# Patient Record
Sex: FEMALE | Race: WHITE | NOT HISPANIC OR LATINO | Employment: OTHER | URBAN - METROPOLITAN AREA
[De-identification: names, ages, dates, MRNs, and addresses within clinical notes are randomized per-mention and may not be internally consistent; named-entity substitution may affect disease eponyms.]

---

## 2017-02-02 ENCOUNTER — OFFICE VISIT (OUTPATIENT)
Dept: DERMATOLOGY | Facility: CLINIC | Age: 57
End: 2017-02-02

## 2017-02-02 DIAGNOSIS — D22.9 MULTIPLE BENIGN NEVI: ICD-10-CM

## 2017-02-02 DIAGNOSIS — L82.1 SEBORRHEIC KERATOSIS: ICD-10-CM

## 2017-02-02 DIAGNOSIS — L30.9 ECZEMA OF BOTH HANDS: ICD-10-CM

## 2017-02-02 DIAGNOSIS — L57.0 AK (ACTINIC KERATOSIS): Primary | ICD-10-CM

## 2017-02-02 DIAGNOSIS — L85.3 XEROSIS OF SKIN: ICD-10-CM

## 2017-02-02 DIAGNOSIS — L21.9 DERMATITIS, SEBORRHEIC: ICD-10-CM

## 2017-02-02 DIAGNOSIS — L81.4 SOLAR LENTIGINOSIS: ICD-10-CM

## 2017-02-02 RX ORDER — KETOCONAZOLE 20 MG/ML
SHAMPOO TOPICAL
Qty: 120 ML | Refills: 11 | Status: SHIPPED | OUTPATIENT
Start: 2017-02-02 | End: 2017-11-08

## 2017-02-02 RX ORDER — TRIAMCINOLONE ACETONIDE 1 MG/G
OINTMENT TOPICAL 2 TIMES DAILY
Qty: 80 G | Refills: 3 | Status: SHIPPED | OUTPATIENT
Start: 2017-02-02 | End: 2017-11-08

## 2017-02-02 ASSESSMENT — PAIN SCALES - GENERAL: PAINLEVEL: NO PAIN (0)

## 2017-02-02 NOTE — PATIENT INSTRUCTIONS
For your Hands:   - recommend using a gentle soap like dove  - recommend using triamcinolone ointment twice daily to your affected areas on your feet and your hands   - recommend using Norwegian Formula Hand Cream two to three times daily     For your Scalp and Face  - recommend ketoconazole shampoo 2-3 times per week to your face and scalp  - Apply 2-3 x weekly to base of scalp when hair is wet and massage in, leave in place for approximately 5-10 minutes then wash out.  - can use your desonide cream if you have any itchy flare    General Dermatology Wound Care Instructions for Liquid Nitrogen Treatment    The treatment area will appear white at first. Over the next several hours a fluid-filled blister may form. The blister can be very dark. If blister appears, it will remain blistered for about a week. Then a scab will form over the area.    Leave the blistered area uncovered as long as it remains closed. If the area breaks open, you may cover it with a clean band aid. Do not pull off the skin covering the blister.    If the blistered area becomes uncomfortably filled with fluid, you may release some of the fluid by puncturing the blister with a needle that has been cleansed with alcohol.    If the skin covering the blister comes off, clean the area daily with soap and water. Apply a small amount of Vaseline and then cover with a clean band aid. Change the band aid twice daily until the skin is completely healed.    Call us if.....    1. You have signs of infection such as thick yellow or pus-like drainage from the wound site or a fever over 100 degrees Fahrenheit.     2. You have any questions or are not sure how to take care of the wound.    General Dermatology  [phone]    Dry Skin    What is dry skin?    Common skin problem    Can be worse during the winter     Affects all ages    Occurs in people with or without other skin problems    What does it look like?    Fine lines in the skin become more visible      Rough feeling skin     Flaky skin    Most common on the arms and legs    Skin can become cracked, especially on the hands and feet    What are some problems caused by dry skin?     Itching    Rubbing or scratching can cause thickened, rough skin patches    Cracks in skin can be painful    Red, itchy, scaly skin (called eczema) can occur    Yellow crusting or pus could be signs of an infection    What causes dry skin?    A lack of water in the top layer of the skin    Too much soapy water,  hot water, or harsh chemicals    Aging and sun damage    How do I treat dry skin?    Shower or bathe daily for under ten minutes with lukewarm water and mild soap.    Pat yourself dry with a towel gently and leave your skin slightly damp.    Use moisturizing cream or ointment right away.  Avoid lotions.    What kind of mild soap should I be using?    Camay , Dove , Tone , Neutrogena , Purpose , or Oil of Olay     A non-detergent cleanser, like Cetaphil , can be used.    What should I stay away from?    Scented soaps     Bath oils    What moisturizers should I be using?    Cetaphil Cream,CeraVe Cream, Vanicream, Aquaphilic, Eucerin, Aquaphor, or Vaseline     Always apply after showering or bathing.    Reapply throughout the day, if possible.    If dry skin affects your hands, always reapply after handwashing.    What else should I know?    Using a humidifier during winter months may help.    If dry skin gets worse or if eczema develops, a steroid cream may be needed.

## 2017-02-02 NOTE — PROGRESS NOTES
[facility] Dermatology Note    Dermatology Problem List:  -AKs  -Family history of NMSC    Encounter Date: Feb 2, 2017    CC:   Chief Complaint   Patient presents with     Derm Problem     oRmi is here today for a skin check. No major concerns.       History of Present Illness:  Romi Cortez is a 56 year old who presents for follow up evaluation for skin check.  She has a family history of non melanoma skin cancer in her mother.  She denies any other new or changing lesions.  She denies any fevers or chills.  She states that she has no new or changing lesions.      Past Medical History:   History reviewed. No pertinent past medical history.  Past Surgical History   Procedure Laterality Date     Laparoscopic appendectomy  11/21/2013     Procedure: LAPAROSCOPIC APPENDECTOMY;  Laparoscopic Appendectomy;  Surgeon: Sung Rodriguez MD;  Location:  OR     D & c  1996       Medications:  Current Outpatient Prescriptions   Medication Sig Dispense Refill     triamcinolone (KENALOG) 0.1 % ointment Apply topically 2 times daily To your affected areas on your hands and feet 80 g 3     ketoconazole (NIZORAL) 2 % shampoo Apply 2 x weekly to base of scalp when hair is wet and massage in, leave in place for approximately 5-10 minutes then wash out. 120 mL 11     ARTIFICIAL TEAR OP Apply 1 drop to eye as needed Both eyes, 1-2 times a month       sertraline (ZOLOFT) 100 MG tablet Take by mouth daily       desonide (DESOWEN) 0.05 % ointment Apply topically 2 times daily To itchy and scaly rash at ears until clear as needed 60 g 11     LamoTRIgine (LAMICTAL PO) Take 200 mg by mouth daily          No Known Allergies    Review of Systems:  -Skin Establ Pt: The patient denies any new rash, pruritus, or lesions that are symptomatic, changing or bleeding, except as per HPI.  -Constitutional: The patient denies fatigue, fevers, chills, unintended weight loss, and night sweats.  -HEENT: Patient denies  nonhealing oral sores.    Physical exam:  There were no vitals taken for this visit.  - This is a well developed, well-nourished female in no acute distress, in a pleasant mood.    - Aguirre's skin type 2  - A full skin examination was performed including the face, scalp, ears, eyelids, lips, neck, chest, back, abdomen, hands, feet, upper and lower extremities and buttocks was performed and findings are as follows:    1 - Actinic Keratosis-Erythematous gritty papules located on the left malar cheek.  2 - Seborrheic Keratosis- light tan to brown, well defined waxy/warty stuck on looking elevated plaque(s) located on chest and back.  3 - Solar lentigines - Scattered brown macules on sun exposed areas.  4 - Xerosis Cutis/Eczematous dermatitis - mild xerosis of the legs and the feet, on the bilateral great toes as well as on the tips of the fingers of the hands there are eczematous plaques with mild fissuring concerning for irritant/eczematous dermatitis.   5 - Seborrheic Dermatitis - Non adherent greasy scale on the scalp and nasolabial folds  6 - Benign nevus- Multiple brown pigmented macules and papules are identified on the exam that are under 0.6cm in size and show normal pigment network and benign growth patterns located on the chest and back  - No other lesions of concern on areas examined.     Impression/Plan:  1 - Actinic Keratosis- Identified at above locations. Each site was treated with cryo therapy, 10 second freeze thaw cycle X 2 cycles per site. A total of 1 sites were treated. The patient was advised that the treated areas will become red, swollen, may develop a blister and then should crust and peal off in the next 1-2 weeks.   2 - Seborrheic Keratosis- Reassured as to the the benign nature.  As these are not itchy or painful, no further treatment required.   3 - Solar lentiginosis: Sun protection with SPF 30 or higher recommended  4 - Xerosis Cutis/Eczematous dermatitis - CeraVe cream daily for  xerosis, recommended Norwegian hand cream formula to use on the hands twice daily, also suggested patient use triamcinolone ointment twice daily to the red itchy areas.  Patient expressed understanding.   5 - Seborrheic Dermatitis: recommended using ketoconazole shampoo to face and scalp 2 times weekly, and also using desonide cream as needed for itching  6 - Benign nevi- abcd of melanoma were discussed and self skin checks were advised.     CC Dr. Persaud on close of this encounter.  Follow-up 1 year or sooner if needed     staffed the patient.    Staff Involved:  Resident(Roxy Gallegos)/Staff(as above)    I have personally examined this patient and agree with Dr. Gallegos's documentation and plan of care. I have reviewed and amended the resident's note above. The documentation accurately reflects my clinical observations, diagnoses, treatment and follow-up plans.   I was present for key portions of the procedure.     Nayana Bazan MD  Dermatology Staff

## 2017-02-02 NOTE — Clinical Note
2/2/2017       RE: Romi Cortez  [address] [facility] [address]     Dear Colleague,    Thank you for referring your patient, Romi Cortez, to the [facility] DERMATOLOGY at [facility]. Please see a copy of my visit note below.    [facility] Dermatology Note    Dermatology Problem List:  -AKs  -Family history of NMSC    Encounter Date: Feb 2, 2017    CC:   Chief Complaint   Patient presents with     Derm Problem     Romi is here today for a skin check. No major concerns.       History of Present Illness:  Romi Cortez is a 56 year old who presents for follow up evaluation for skin check.  She has a family history of non melanoma skin cancer in her mother.  She denies any other new or changing lesions.  She denies any fevers or chills.  She states that she has no new or changing lesions.      Past Medical History:   History reviewed. No pertinent past medical history.  Past Surgical History   Procedure Laterality Date     Laparoscopic appendectomy  11/21/2013     Procedure: LAPAROSCOPIC APPENDECTOMY;  Laparoscopic Appendectomy;  Surgeon: Sung Rodriguez MD;  Location:  OR     D & c  1996       Medications:  Current Outpatient Prescriptions   Medication Sig Dispense Refill     triamcinolone (KENALOG) 0.1 % ointment Apply topically 2 times daily To your affected areas on your hands and feet 80 g 3     ketoconazole (NIZORAL) 2 % shampoo Apply 2 x weekly to base of scalp when hair is wet and massage in, leave in place for approximately 5-10 minutes then wash out. 120 mL 11     ARTIFICIAL TEAR OP Apply 1 drop to eye as needed Both eyes, 1-2 times a month       sertraline (ZOLOFT) 100 MG tablet Take by mouth daily       desonide (DESOWEN) 0.05 % ointment Apply topically 2 times daily To itchy and scaly rash at ears until clear as needed 60 g 11     LamoTRIgine (LAMICTAL PO) Take 200 mg by mouth daily          No Known  Allergies    Review of Systems:  -Skin Establ Pt: The patient denies any new rash, pruritus, or lesions that are symptomatic, changing or bleeding, except as per HPI.  -Constitutional: The patient denies fatigue, fevers, chills, unintended weight loss, and night sweats.  -HEENT: Patient denies nonhealing oral sores.    Physical exam:  There were no vitals taken for this visit.  - This is a well developed, well-nourished female in no acute distress, in a pleasant mood.    - Aguirre's skin type 2  - A full skin examination was performed including the face, scalp, ears, eyelids, lips, neck, chest, back, abdomen, hands, feet, upper and lower extremities and buttocks was performed and findings are as follows:    1 - Actinic Keratosis-Erythematous gritty papules located on the left malar cheek.  2 - Seborrheic Keratosis- light tan to brown, well defined waxy/warty stuck on looking elevated plaque(s) located on chest and back.  3 - Solar lentigines - Scattered brown macules on sun exposed areas.  4 - Xerosis Cutis/Eczematous dermatitis - mild xerosis of the legs and the feet, on the bilateral great toes as well as on the tips of the fingers of the hands there are eczematous plaques with mild fissuring concerning for irritant/eczematous dermatitis.   5 - Seborrheic Dermatitis - Non adherent greasy scale on the scalp and nasolabial folds  6 - Benign nevus- Multiple brown pigmented macules and papules are identified on the exam that are under 0.6cm in size and show normal pigment network and benign growth patterns located on the chest and back  - No other lesions of concern on areas examined.     Impression/Plan:  1 - Actinic Keratosis- Identified at above locations. Each site was treated with cryo therapy, 10 second freeze thaw cycle X 2 cycles per site. A total of 1 sites were treated. The patient was advised that the treated areas will become red, swollen, may develop a blister and then should crust and peal off in the  next 1-2 weeks.   2 - Seborrheic Keratosis- Reassured as to the the benign nature.  As these are not itchy or painful, no further treatment required.   3 - Solar lentiginosis: Sun protection with SPF 30 or higher recommended  4 - Xerosis Cutis/Eczematous dermatitis - CeraVe cream daily for xerosis, recommended Norwegian hand cream formula to use on the hands twice daily, also suggested patient use triamcinolone ointment twice daily to the red itchy areas.  Patient expressed understanding.   5 - Seborrheic Dermatitis: recommended using ketoconazole shampoo to face and scalp 2 times weekly, and also using desonide cream as needed for itching  6 - Benign nevi- abcd of melanoma were discussed and self skin checks were advised.     CC Dr. Persaud on close of this encounter.  Follow-up 1 year or sooner if needed     staffed the patient.    Staff Involved:  Resident(Roxy Gallegos)/Staff(as above)    I have personally examined this patient and agree with Dr. Gallegos's documentation and plan of care. I have reviewed and amended the resident's note above. The documentation accurately reflects my clinical observations, diagnoses, treatment and follow-up plans.   I was present for key portions of the procedure.     Nayana Bazan MD  Dermatology Staff

## 2017-02-02 NOTE — MR AVS SNAPSHOT
After Visit Summary   2/2/2017    Romi Cortez    MRN: [id number]           Patient Information     Date Of Birth          1960        Visit Information        Provider Department      2/2/2017 9:45 AM Roxy Gallegos MD [facility] Dermatology        Today's Diagnoses     AK (actinic keratosis)    -  1     Solar lentiginosis         Dermatitis, seborrheic         Eczema of both hands         Xerosis of skin         Seborrheic keratosis           Care Instructions    For your Hands:   - recommend using a gentle soap like dove  - recommend using triamcinolone ointment twice daily to your affected areas on your feet and your hands   - recommend using Norwegian Formula Hand Cream two to three times daily     For your Scalp and Face  - recommend ketoconazole shampoo 2-3 times per week to your face and scalp  - Apply 2-3 x weekly to base of scalp when hair is wet and massage in, leave in place for approximately 5-10 minutes then wash out.  - can use your desonide cream if you have any itchy flare    General Dermatology Wound Care Instructions for Liquid Nitrogen Treatment    The treatment area will appear white at first. Over the next several hours a fluid-filled blister may form. The blister can be very dark. If blister appears, it will remain blistered for about a week. Then a scab will form over the area.    Leave the blistered area uncovered as long as it remains closed. If the area breaks open, you may cover it with a clean band aid. Do not pull off the skin covering the blister.    If the blistered area becomes uncomfortably filled with fluid, you may release some of the fluid by puncturing the blister with a needle that has been cleansed with alcohol.    If the skin covering the blister comes off, clean the area daily with soap and water. Apply a small amount of Vaseline and then cover with a clean band aid. Change the band aid twice daily until the skin is completely healed.    Call us  if.....    1. You have signs of infection such as thick yellow or pus-like drainage from the wound site or a fever over 100 degrees Fahrenheit.     2. You have any questions or are not sure how to take care of the wound.    General Dermatology  [phone]    Dry Skin    What is dry skin?    Common skin problem    Can be worse during the winter     Affects all ages    Occurs in people with or without other skin problems    What does it look like?    Fine lines in the skin become more visible     Rough feeling skin     Flaky skin    Most common on the arms and legs    Skin can become cracked, especially on the hands and feet    What are some problems caused by dry skin?     Itching    Rubbing or scratching can cause thickened, rough skin patches    Cracks in skin can be painful    Red, itchy, scaly skin (called eczema) can occur    Yellow crusting or pus could be signs of an infection    What causes dry skin?    A lack of water in the top layer of the skin    Too much soapy water,  hot water, or harsh chemicals    Aging and sun damage    How do I treat dry skin?    Shower or bathe daily for under ten minutes with lukewarm water and mild soap.    Pat yourself dry with a towel gently and leave your skin slightly damp.    Use moisturizing cream or ointment right away.  Avoid lotions.    What kind of mild soap should I be using?    Camay , Dove , Tone , Neutrogena , Purpose , or Oil of Olay     A non-detergent cleanser, like Cetaphil , can be used.    What should I stay away from?    Scented soaps     Bath oils    What moisturizers should I be using?    Cetaphil Cream,CeraVe Cream, Vanicream, Aquaphilic, Eucerin, Aquaphor, or Vaseline     Always apply after showering or bathing.    Reapply throughout the day, if possible.    If dry skin affects your hands, always reapply after handwashing.    What else should I know?    Using a humidifier during winter months may help.    If dry skin gets worse or if eczema develops, a  steroid cream may be needed.          Follow-ups after your visit        Who to contact     Please call your clinic at [phone] to:    Ask questions about your health    Make or cancel appointments    Discuss your medicines    Learn about your test results    Speak to your doctor   If you have compliments or concerns about an experience at your clinic, or if you wish to file a complaint, please contact [facility] Patient Relations at [phone] or email us at [email or web address]         Additional Information About Your Visit        [employer] Information     Delivered gives you secure access to your electronic health record. If you see a primary care provider, you can also send messages to your care team and make appointments. If you have questions, please call your primary care clinic.  If you do not have a primary care provider, please call [phone] and they will assist you.      Delivered is an electronic gateway that provides easy, online access to your medical records. With [employer]ivered, you can request a clinic appointment, read your test results, renew a prescription or communicate with your care team.     To access your existing account, please contact your [facility] Clinic or call [phone] for assistance.        Care EveryWhere ID     This is your Care EveryWhere ID. This could be used by other organizations to access your [place] medical records  [id number]         Blood Pressure from Last 3 Encounters:   11/21/13 104/71    Weight from Last 3 Encounters:   11/20/13 88.905 kg (196 lb)              Today, you had the following     No orders found for display         Today's Medication Changes          These changes are accurate as of: 2/2/17 10:24 AM.  If you have any questions, ask your nurse or doctor.               Start taking these medicines.        Dose/Directions    triamcinolone 0.1 % ointment   Commonly known as:  KENALOG   Used  for:  Eczema of both hands   Started by:  Roxy Gallegos MD        Apply topically 2 times daily To your affected areas on your hands and feet   Quantity:  80 g   Refills:  3         These medicines have changed or have updated prescriptions.        Dose/Directions    ketoconazole 2 % shampoo   Commonly known as:  NIZORAL   This may have changed:  additional instructions   Used for:  Dermatitis, seborrheic   Changed by:  Roxy Gallegos MD        Apply 2 x weekly to base of scalp when hair is wet and massage in, leave in place for approximately 5-10 minutes then wash out.   Quantity:  120 mL   Refills:  11            Where to get your medicines      These medications were sent to [facility] [id number] [place] [facility] - [place], [place] - [address]  [address] [address]     Phone:  [phone]    - ketoconazole 2 % shampoo  - triamcinolone 0.1 % ointment             Primary Care Provider Office Phone # Fax #    Mayra Persaud [phone]       [facility] [address] [address]        Thank you!     Thank you for choosing [facility] DERMATOLOGY  for your care. Our goal is always to provide you with excellent care. Hearing back from our patients is one way we can continue to improve our services. Please take a few minutes to complete the written survey that you may receive in the mail after your visit with us. Thank you!             Your Updated Medication List - Protect others around you: Learn how to safely use, store and throw away your medicines at www.disposemymeds.org.          This list is accurate as of: 2/2/17 10:24 AM.  Always use your most recent med list.                   Brand Name Dispense Instructions for use    ARTIFICIAL TEAR OP      Apply 1 drop to eye as needed Both eyes, 1-2 times a month       desonide 0.05 % ointment    DESOWEN    60 g    Apply topically 2 times daily To itchy and scaly rash at ears until clear as needed       ketoconazole 2  % shampoo    NIZORAL    120 mL    Apply 2 x weekly to base of scalp when hair is wet and massage in, leave in place for approximately 5-10 minutes then wash out.       LAMICTAL PO      Take 200 mg by mouth daily       sertraline 100 MG tablet    ZOLOFT     Take by mouth daily       triamcinolone 0.1 % ointment    KENALOG    80 g    Apply topically 2 times daily To your affected areas on your hands and feet

## 2017-02-02 NOTE — NURSING NOTE
Dermatology Rooming Note    Romi Cortez's goals for this visit include:   Chief Complaint   Patient presents with     Derm Problem     Romi is here today for a skin check. No major concerns.     Shonna August MA

## 2017-06-24 ENCOUNTER — HEALTH MAINTENANCE LETTER (OUTPATIENT)
Age: 57
End: 2017-06-24

## 2017-11-08 ENCOUNTER — OFFICE VISIT (OUTPATIENT)
Dept: SLEEP MEDICINE | Facility: CLINIC | Age: 57
End: 2017-11-08
Attending: NURSE PRACTITIONER
Payer: COMMERCIAL

## 2017-11-08 VITALS
HEIGHT: 66 IN | RESPIRATION RATE: 18 BRPM | OXYGEN SATURATION: 96 % | HEART RATE: 67 BPM | WEIGHT: 214 LBS | BODY MASS INDEX: 34.39 KG/M2 | SYSTOLIC BLOOD PRESSURE: 128 MMHG | DIASTOLIC BLOOD PRESSURE: 84 MMHG

## 2017-11-08 DIAGNOSIS — G47.21 CIRCADIAN RHYTHM SLEEP DISORDER, DELAYED SLEEP PHASE TYPE: ICD-10-CM

## 2017-11-08 DIAGNOSIS — Z87.898 HISTORY OF SNORING: Primary | ICD-10-CM

## 2017-11-08 DIAGNOSIS — E66.811 CLASS 1 OBESITY WITH BODY MASS INDEX (BMI) OF 34.0 TO 34.9 IN ADULT, UNSPECIFIED OBESITY TYPE, UNSPECIFIED WHETHER SERIOUS COMORBIDITY PRESENT: ICD-10-CM

## 2017-11-08 RX ORDER — TRIAMCINOLONE ACETONIDE 0.25 MG/G
OINTMENT TOPICAL 2 TIMES DAILY
COMMUNITY
End: 2023-12-02

## 2017-11-08 NOTE — MR AVS SNAPSHOT
"              After Visit Summary   11/8/2017    Romi Cortez    MRN: [id number]           Patient Information     Date Of Birth          1960        Visit Information        Provider Department      11/8/2017 1:00 PM Fiona Yen APRN [facility], [place], Sleep Study        Care Instructions    MY TREATMENT INFORMATION FOR SLEEP APNEA-  Romi Cortez    NP : Fiona Yen  SLEEP CENTER :   [place]   MY CONTACT NUMBER: [phone]    Call re cost of polysomnogram or home sleep study.  Then call insurance regarding reimbursement  If on my chart: my chart me on your choice,  Am I having a sleep study at a sleep center?  Make sure you have an appointment for the study before you leave!    Am I having a home sleep study?  Watch this video:  https://www.[employer].com/watch?v=CteI_GhyP9g&list=PLC4F_nvCEvSxpvRkgPszaicmjcb2PMExm    Frequently asked questions:  1. What is Obstructive Sleep Apnea (ALE)? ALE is the most common type of sleep apnea. Apnea means, \"without breath.\"  Apnea is most often caused by narrowing or collapse of the upper airway as muscles relax during sleep.   Almost everyone has occasional apneas. Most people with sleep apnea have had brief interruptions at night frequently for many years.  The severity of sleep apnea is related to how frequent and severe the events are.   2. What are the consequences of ALE? Symptoms include: feeling sleepy during the day, snoring loudly, gasping or stopping of breathing, trouble sleeping, and occasionally morning headaches or heartburn at night.  Sleepiness can be serious and even increase the risk of falling asleep while driving. Other health consequences may include development of high blood pressure and other cardiovascular disease in persons who are susceptible. Untreated ALE  can contribute to heart disease, stroke and diabetes.   3. What are the treatment options? In most situations, sleep apnea is a lifelong " disease that must be managed with daily therapy. Medications are not effective for sleep apnea and surgery is generally not considered until other therapies have been tried. Your treatment is your choice . Continuous Positive Airway (CPAP) works right away and is the therapy that is effective in nearly everyone. An oral device to hold your jaw forward is usually the next most reliable option. Other options include postioning devices (to keep you off your back), weight loss, and surgery including a tongue pacing device. There is more detail about some of these options below.    Important tips for using CPAP and similar devices   Know your equipment:  CPAP is continuous positive airway pressure that prevents obstructive sleep apnea by keeping the throat from collapsing while you are sleeping. In most cases, the device is  smart  and can slowly self-adjusts if your throat collapses and keeps a record every day of how well you are treated-this information is available to you and your care team.  BPAP is bilevel positive airway pressure that keeps your throat open and also assists each breath with a pressure boost to maintain adequate breathing.  Special kinds of BPAP are used in patients who have inadequate breathing from lung or heart disease. In most cases, the device is  smart  and can slowly self-adjusts to assist breathing. Like CPAP, the device keeps a record of how well you are treated.  Your mask is your connection to the device. You get to choose what feels most comfortable and the staff will help to make sure if fits. Here: are some examples of the different masks that are available:       Key points to remember on your journey with sleep apnea:  1. Sleep study.  PAP devices often need to be adjusted during a sleep study to show that they are effective and adjusted right.  2. Good tips to remember: Try wearing just the mask during a quiet time during the day so your body adapts to wearing it. A humidifier is  recommended for comfort in most cases to prevent drying of your nose and throat. Allergy medication from your provider may help you if you are having nasal congestion.  3. Getting settled-in. It takes more than one night for most of us to get used to wearing a mask. Try wearing just the mask during a quiet time during the day so your body adapts to wearing it. A humidifier is recommended for comfort in most cases. Our team will work with you carefully on the first day and will be in contact within 4 days and again at 2 and 4 weeks for advice and remote device adjustments. Your therapy is evaluated by the device each day.   4. Use it every night. The more you are able to sleep naturally for 7-8 hours, the more likely you will have good sleep and to prevent health risks or symptoms from sleep apnea. Even if you use it 4 hours it helps. Occasionally all of us are unable to use a medical therapy, in sleep apnea, it is not dangerous to miss one night.   5. Communicate. Call our skilled team on the number provided on the first day if your visit for problems that make it difficult to wear the device. Over 2 out of 3 patients can learn to wear the device long-term with help from our team. Remember to call our team or your sleep providers if you are unable to wear the device as we may have other solutions for those who cannot adapt to mask CPAP therapy. It is recommended that you sleep your sleep provider within the first 3 months and yearly after that if you are not having problems.   Take care of your equipment. Make sure you clean your mask and tubing using directions every day and that your filter and mask are replaced as recommended or if they are not working.     BESIDES CPAP, WHAT OTHER THERAPIES ARE THERE?    Positioning Device  Positioning devices are generally used when sleep apnea is mild and only occurs on your back.This example shows a pillow that straps around the waist. It may be appropriate for those whose  sleep study shows milder sleep apnea that occurs primarily when lying flat on one's back. Preliminary studies have shown benefit but effectiveness at home may need to be verified by a home sleep test. These devices are generally not covered by medical insurance.  Positioning Device  Positioning devices are generally used when sleep apnea is mild and only occurs on your back.This example shows a pillow that straps around the waist. It may be appropriate for those whose sleep study shows milder sleep apnea that occurs primarily when lying flat on one's back. Preliminary studies have shown benefit but effectiveness at home may need to be verified by a home sleep test. These devices are generally not covered by medical insurance.    LOOKCAST or  [employer] for snore bumper pillow; or backpack w/ chest strap stuffed w/ pillow;  or t shirt 2  pockets sew in tennis balls  Examples of devices that maintain sleeping on the back to prevent snoring and mild sleep apnea.    Belt type body positioner  Http://[employer]/    Electronic reminder  Http://nightshifttherapy.com/  http://www.[employer].au/product.html    http://[employer]/    Oral Appliance  What is oral appliance therapy?  An oral appliance device fits on your teeth at night like a retainer used after having braces. The device is made by a specialized dentist and requires several visits over 1-2 months before a manufactured device is made to fit your teeth and is adjusted to prevent your sleep apnea. Once an oral device is working properly, snoring should be improved. A home sleep test may be recommended at that time if to determine whether the sleep apnea is adequately treated.       Some things to remember:  -Oral devices are often, but not always, covered by your medical insurance. Be sure to check with your insurance provider.   -If you are referred for oral therapy, you will be given a list of specialized dentists to consider or you may choose to visit the Web site  of the American Academy of Dental Sleep Medicine  -Oral devices are less likely to work if you have severe sleep apnea or are extremely overweight.     More detailed information  An oral appliance is a small acrylic device that fits over the upper and lower teeth  (similar to a retainer or a mouth guard). This device slightly moves jaw forward, which moves the base of the tongue forward, opens the airway, improves breathing for effective treat snoring and obstructive sleep apnea in perhaps 7 out of 10 people .  The best working devices are custom-made by a dental device  after a mold is made of the teeth 1, 2, 3.  When is an oral appliance indicated?  Oral appliance therapy is recommended as a first-line treatment for patients with primary snoring, mild sleep apnea, and for patients with moderate sleep apnea who prefer appliance therapy to use of CPAP4, 5. Severity of sleep apnea is determined by sleep testing and is based on the number of respiratory events per hour of sleep.   How successful is oral appliance therapy?  The success rate of oral appliance therapy in patients with mild sleep apnea is 75-80% while in patients with moderate sleep apnea it is 50-70%. The chance of success in patients with severe sleep apnea is 40-50%. The research also shows that oral appliances have a beneficial effect on the cardiovascular health of ALE patients at the same magnitude as CPAP therapy7.  Oral appliances should be a second-line treatment in cases of severe sleep apnea, but if not completely successful then a combination therapy utilizing CPAP plus oral appliance therapy may be effective. Oral appliances tend to be effective in a broad range of patients although studies show that the patients who have the highest success are females, younger patients, those with milder disease, and less severe obesity. 3, 6.   Finding a dentist that practices dental sleep medicine  Specific training is available through the  American Academy of Dental Sleep Medicine for dentists interested in working in the field of sleep. To find a dentist who is educated in the field of sleep and the use of oral appliances, near you, visit the Web site of the American Academy of Dental Sleep Medicine.    References  1. Debi et al. Objectively measured vs self-reported compliance during oral appliance therapy for sleep-disordered breathing. Chest 2013; 144(5): 6335-1745.  2. Jerry et al. Objective measurement of compliance during oral appliance therapy for sleep-disordered breathing. Thorax 2013; 68(1): 91-96.  3. Arnol et al. Mandibular advancement devices in 620 men and women with ALE and snoring: tolerability and predictors of treatment success. Chest 2004; 125: 8804-4936.  4. Patrice et al. Oral appliances for snoring and ALE: a review. Sleep 2006; 29: 244-262.  5. Kasi et al. Oral appliance treatment for ALE: an update. J Clin Sleep Med 2014; 10(2): 215-227.  6. Clare et al. Predictors of OSAH treatment outcome. J Dent Res 2007; 86: 6369-9323.      Weight Loss:    Weight loss is a long-term strategy that may improve sleep apnea in some patients.    Weight management is a personal decision.  If you are interested in exploring weight loss strategies, the following discussion covers the impact on weight loss on sleep apnea and the approaches that may be successful.    Weight loss decreases severity of sleep apnea in most people with obesity. For those with mild obesity who have developed snoring with weight gain, even 15-30 pound weight loss can improve and occasionally eliminate sleep apnea.  Structured and life-long dietary and health habits are necessary to lose weight and keep healthier weight levels.     Though there may be significant health benefits from weight loss, long-term weight loss is very difficult to achieve- studies show success with dietary management in less than 10% of people. In addition, substantial  weight loss may require years of dietary control and may be difficult if patients have severe obesity. In these cases, surgical management may be considered.  Finally, older individuals who have tolerated obesity without health complications may be less likely to benefit from weight loss strategies.      [unfilled]    Surgery:    Surgery for obstructive sleep apnea is considered generally only when other therapies fail to work. Surgery may be discussed with you if you are having a difficult time tolerating CPAP and or when there is an abnormal structure that requires surgical correction.  Nose and throat surgeries often enlarge the airway to prevent collapse.  Most of these surgeries create pain for 1-2 weeks and up to half of the most common surgeries are not effective throughout life.  You should carefully discuss the benefits and drawbacks to surgery with your sleep provider and surgeon to determine if it is the best solution for you.   More information  Surgery for ALE is directed at areas that are responsible for narrowing or complete obstruction of the airway during sleep.  There are a wide range of procedures available to enlarge and/or stabilize the airway to prevent blockage of breathing in the three major areas where it can occur: the palate, tongue, and nasal regions.  Successful surgical treatment depends on the accurate identification of the factors responsible for obstructive sleep apnea in each person.  A personalized approach is required because there is no single treatment that works well for everyone.  Because of anatomic variation, consultation with an examination by a sleep surgeon is a critical first step in determining what surgical options are best for each patient.  In some cases, examination during sedation may be recommended in order to guide the selection of procedures.  Patients will be counseled about risks and benefits as well as the typical recovery course after surgery. Surgery is  typically not a cure for a person s ALE.  However, surgery will often significantly improve one s ALE severity (termed  success rate ).  Even in the absence of a cure, surgery will decrease the cardiovascular risk associated with OSA7; improve overall quality of life8 (sleepiness, functionality, sleep quality, etc).      Palate Procedures:  Patients with ALE often have narrowing of their airway in the region of their tonsils and uvula.  The goals of palate procedures are to widen the airway in this region as well as to help the tissues resist collapse.  Modern palate procedure techniques focus on tissue conservation and soft tissue rearrangement, rather than tissue removal.  Often the uvula is preserved in this procedure. Residual sleep apnea is common in patient after pharyngoplasty with an average reduction in sleep apnea events of 33%2.      Tongue Procedures:  ExamWhile patients are awake, the muscles that surround the throat are active and keep this region open for breathing. These muscles relax during sleep, allowing the tongue and other structures to collapse and block breathing.  There are several different tongue procedures available.  Selection of a tongue base procedure depends on characteristics seen on physical exam.  Generally, procedures are aimed at removing bulky tissues in this area or preventing the back of the tongue from falling back during sleep.  Success rates for tongue surgery range from 50-62%3.    Hypoglossal Nerve Stimulation:  Hypoglossal nerve stimulation has recently received approval from the [place] Food and Drug Administration for the treatment of obstructive sleep apnea.  This is based on research showing that the system was safe and effective in treating sleep apnea6.  Results showed that the median AHI score decreased 68%, from 29.3 to 9.0. This therapy uses an implant system that senses breathing patterns and delivers mild stimulation to airway muscles, which keeps the  airway open during sleep.  The system consists of three fully implanted components: a small generator (similar in size to a pacemaker), a breathing sensor, and a stimulation lead.  Using a small handheld remote, a patient turns the therapy on before bed and off upon awakening.    Candidates for this device must be greater than 22 years of age, have moderate to severe ALE (AHI between 20-65), BMI less than 32, have tried CPAP/oral appliance without success, and have appropriate upper airway anatomy (determined by a sleep endoscopy performed by Dr. Rivera).    Hypoglossal Nerve Stimulation Pathway:    The sleep surgeon s office will work with the patient through the insurance prior-authorization process (including communications and appeals).    Nasal Procedures:  Nasal obstruction can interfere with nasal breathing during the day and night.  Studies have shown that relief of nasal obstruction can improve the ability of some patients to tolerate positive airway pressure therapy for obstructive sleep apnea1.  Treatment options include medications such as nasal saline, topical corticosteroid and antihistamine sprays, and oral medications such as antihistamines or decongestants. Non-surgical treatments can include external nasal dilators for selected patients. If these are not successful by themselves, surgery can improve the nasal airway either alone or in combination with these other options.      Combination Procedures:  Combination of surgical procedures and other treatments may be recommended, particularly if patients have more than one area of narrowing or persistent positional disease.  The success rate of combination surgery ranges from 66-80%2,3.    References  1. Fahad BALBUENA. The Role of the Nose in Snoring and Obstructive Sleep Apnoea: An Update.  Eur Arch Otorhinolaryngol. 2011; 268: 1365-73.  2.  Philly SM; Louis MINA; Elsi JOHN; Pallanch JF; Shira ESPARZA; Melina RAMIREZ; Marilyn BRODERICK. Surgical modifications of the upper  airway for obstructive sleep apnea in adults: a systematic review and meta-analysis. SLEEP 2010;33(10):9580-4425. Edgardo ARRIOLA. Hypopharyngeal surgery in obstructive sleep apnea: an evidence-based medicine review.  Arch Otolaryngol Head Neck Surg. 2006 Feb;132(2):206-13.  3. Lon YH1, Norma Y, Isaak MARY. The efficacy of anatomically based multilevel surgery for obstructive sleep apnea. Otolaryngol Head Neck Surg. 2003 Oct;129(4):327-35.  4. Kezirian E, Goldberg A. Hypopharyngeal Surgery in Obstructive Sleep Apnea: An Evidence-Based Medicine Review. Arch Otolaryngol Head Neck Surg. 2006 Feb;132(2):206-13.  5. Katelynn ACEVEDO et al. Upper-Airway Stimulation for Obstructive Sleep Apnea.  N Engl J Med. 2014 Jan 9;370(2):139-49.  6. Miguel Y et al. Increased Incidence of Cardiovascular Disease in Middle-aged Men with Obstructive Sleep Apnea. Am J Respir Crit Care Med; 2002 166: 159-165  7. Terell STANTON et al. Studying Life Effects and Effectiveness of Palatopharyngoplasty (SLEEP) study: Subjective Outcomes of Isolated Uvulopalatopharyngoplasty. Otolaryngol Head Neck Surg. 2011; 144: 623-631.                      Follow-ups after your visit        Follow-up notes from your care team     Return for Yady-help with my chart ?.      Your next 10 appointments already scheduled     Jan 11, 2018  9:45 AM CST   (Arrive by 9:30 AM)   Return Visit with Linda Julian MD   [facility] ([facility])    [address] [place] [id number] [phone]              Who to contact     If you have questions or need follow up information about today's clinic visit or your schedule please [facility] directly at [phone].  Normal or non-critical lab and imaging results will be communicated to you by MyChart, letter or phone within 4 business days after the clinic has received the results. If you do not hear from us within 7 days, please contact the clinic  "through [employer] or phone. If you have a critical or abnormal lab result, we will notify you by phone as soon as possible.  Submit refill requests through [employer] or call your pharmacy and they will forward the refill request to us. Please allow 3 business days for your refill to be completed.          Additional Information About Your Visit        [employer] Information     [employer] gives you secure access to your electronic health record. If you see a primary care provider, you can also send messages to your care team and make appointments. If you have questions, please call your primary care clinic.  If you do not have a primary care provider, please call [phone] and they will assist you.        Care EveryWhere ID     This is your Care EveryWhere ID. This could be used by other organizations to access your [facility] medical records  [id number]        Your Vitals Were     Pulse Respirations Height Pulse Oximetry BMI (Body Mass Index)       67 18 1.676 m (5' 6\") 96% 34.54 kg/m2        Blood Pressure from Last 3 Encounters:   11/08/17 128/84   11/21/13 104/71    Weight from Last 3 Encounters:   11/08/17 97.1 kg (214 lb)   11/20/13 88.9 kg (196 lb)              Today, you had the following     No orders found for display       Primary Care Provider Office Phone # Fax #    Mayra Persaud [phone]       [facility] [address] [facility] [address]        Equal Access to Services     ARINA PURDY AH: Hadii aad ku hadasho Soomaali, waaxda luqadaha, qaybta kaalmada adeegyada, anthony rhodesin hayoksanan che ryan. So [facility] [place] [phone].    ATENCIÓN: Si habla español, tiene a arellano disposición servicios gratuitos de asistencia lingüística. Llame al [phone].    We comply with applicable federal civil rights laws and [place] laws. We do not discriminate on the basis of race, color, national origin, age, disability, sex, sexual orientation, or gender identity.            Thank you!     " Thank you for choosing [facility]  for your care. Our goal is always to provide you with excellent care. Hearing back from our patients is one way we can continue to improve our services. Please take a few minutes to complete the written survey that you may receive in the mail after your visit with us. Thank you!             Your Updated Medication List - Protect others around you: Learn how to safely use, store and throw away your medicines at www.disposemymeds.org.          This list is accurate as of: 11/8/17  2:11 PM.  Always use your most recent med list.                   Brand Name Dispense Instructions for use Diagnosis    ARTIFICIAL TEAR OP      Apply 1 drop to eye as needed Both eyes, 1-2 times a month        LAMICTAL PO      Take 200 mg by mouth daily        sertraline 100 MG tablet    ZOLOFT     Take by mouth daily        triamcinolone 0.025 % ointment    KENALOG     Apply topically 2 times daily

## 2017-11-08 NOTE — PATIENT INSTRUCTIONS
"MY TREATMENT INFORMATION FOR SLEEP APNEA-  Romi Cortez    NP : Fiona Renner Yen  SLEEP CENTER :   [facility]   MY CONTACT NUMBER: [phone]    Call re cost of polysomnogram or home sleep study.  Then call insurance regarding reimbursement  If on my chart: my chart me on your choice,  Am I having a sleep study at a sleep center?  Make sure you have an appointment for the study before you leave!    Am I having a home sleep study?  Watch this video:  [email or web address]/watch?[email or web address]    Frequently asked questions:  1. What is Obstructive Sleep Apnea (ALE)? ALE is the most common type of sleep apnea. Apnea means, \"without breath.\"  Apnea is most often caused by narrowing or collapse of the upper airway as muscles relax during sleep.   Almost everyone has occasional apneas. Most people with sleep apnea have had brief interruptions at night frequently for many years.  The severity of sleep apnea is related to how frequent and severe the events are.   2. What are the consequences of ALE? Symptoms include: feeling sleepy during the day, snoring loudly, gasping or stopping of breathing, trouble sleeping, and occasionally morning headaches or heartburn at night.  Sleepiness can be serious and even increase the risk of falling asleep while driving. Other health consequences may include development of high blood pressure and other cardiovascular disease in persons who are susceptible. Untreated ALE  can contribute to heart disease, stroke and diabetes.   3. What are the treatment options? In most situations, sleep apnea is a lifelong disease that must be managed with daily therapy. Medications are not effective for sleep apnea and surgery is generally not considered until other therapies have been tried. Your treatment is your choice . Continuous Positive Airway (CPAP) works right away and is the therapy that is effective in nearly everyone. An oral " device to hold your jaw forward is usually the next most reliable option. Other options include postioning devices (to keep you off your back), weight loss, and surgery including a tongue pacing device. There is more detail about some of these options below.    Important tips for using CPAP and similar devices   Know your equipment:  CPAP is continuous positive airway pressure that prevents obstructive sleep apnea by keeping the throat from collapsing while you are sleeping. In most cases, the device is  smart  and can slowly self-adjusts if your throat collapses and keeps a record every day of how well you are treated-this information is available to you and your care team.  BPAP is bilevel positive airway pressure that keeps your throat open and also assists each breath with a pressure boost to maintain adequate breathing.  Special kinds of BPAP are used in patients who have inadequate breathing from lung or heart disease. In most cases, the device is  smart  and can slowly self-adjusts to assist breathing. Like CPAP, the device keeps a record of how well you are treated.  Your mask is your connection to the device. You get to choose what feels most comfortable and the staff will help to make sure if fits. Here: are some examples of the different masks that are available:       Key points to remember on your journey with sleep apnea:  1. Sleep study.  PAP devices often need to be adjusted during a sleep study to show that they are effective and adjusted right.  2. Good tips to remember: Try wearing just the mask during a quiet time during the day so your body adapts to wearing it. A humidifier is recommended for comfort in most cases to prevent drying of your nose and throat. Allergy medication from your provider may help you if you are having nasal congestion.  3. Getting settled-in. It takes more than one night for most of us to get used to wearing a mask. Try wearing just the mask during a quiet time during the  day so your body adapts to wearing it. A humidifier is recommended for comfort in most cases. Our team will work with you carefully on the first day and will be in contact within 4 days and again at 2 and 4 weeks for advice and remote device adjustments. Your therapy is evaluated by the device each day.   4. Use it every night. The more you are able to sleep naturally for 7-8 hours, the more likely you will have good sleep and to prevent health risks or symptoms from sleep apnea. Even if you use it 4 hours it helps. Occasionally all of us are unable to use a medical therapy, in sleep apnea, it is not dangerous to miss one night.   5. Communicate. Call our skilled team on the number provided on the first day if your visit for problems that make it difficult to wear the device. Over 2 out of 3 patients can learn to wear the device long-term with help from our team. Remember to call our team or your sleep providers if you are unable to wear the device as we may have other solutions for those who cannot adapt to mask CPAP therapy. It is recommended that you sleep your sleep provider within the first 3 months and yearly after that if you are not having problems.   Take care of your equipment. Make sure you clean your mask and tubing using directions every day and that your filter and mask are replaced as recommended or if they are not working.     BESIDES CPAP, WHAT OTHER THERAPIES ARE THERE?    Positioning Device  Positioning devices are generally used when sleep apnea is mild and only occurs on your back.This example shows a pillow that straps around the waist. It may be appropriate for those whose sleep study shows milder sleep apnea that occurs primarily when lying flat on one's back. Preliminary studies have shown benefit but effectiveness at home may need to be verified by a home sleep test. These devices are generally not covered by medical insurance.  Positioning Device  Positioning devices are generally used  when sleep apnea is mild and only occurs on your back.This example shows a pillow that straps around the waist. It may be appropriate for those whose sleep study shows milder sleep apnea that occurs primarily when lying flat on one's back. Preliminary studies have shown benefit but effectiveness at home may need to be verified by a home sleep test. These devices are generally not covered by medical insurance.    Amazon or  ebay for snore bumper pillow; or backpack w/ chest strap stuffed w/ pillow;  or t shirt 2  pockets sew in tennis balls  Examples of devices that maintain sleeping on the back to prevent snoring and mild sleep apnea.    Belt type body positioner  Http://[employer]/    Electronic reminder  Http://nightshifttherapy.com/  http://www.creads.[employer].au/product.html    http://[employer]/    Oral Appliance  What is oral appliance therapy?  An oral appliance device fits on your teeth at night like a retainer used after having braces. The device is made by a specialized dentist and requires several visits over 1-2 months before a manufactured device is made to fit your teeth and is adjusted to prevent your sleep apnea. Once an oral device is working properly, snoring should be improved. A home sleep test may be recommended at that time if to determine whether the sleep apnea is adequately treated.       Some things to remember:  -Oral devices are often, but not always, covered by your medical insurance. Be sure to check with your insurance provider.   -If you are referred for oral therapy, you will be given a list of specialized dentists to consider or you may choose to visit the Web site of the American Academy of Dental Sleep Medicine  -Oral devices are less likely to work if you have severe sleep apnea or are extremely overweight.     More detailed information  An oral appliance is a small acrylic device that fits over the upper and lower teeth  (similar to a retainer or a mouth guard). This device  slightly moves jaw forward, which moves the base of the tongue forward, opens the airway, improves breathing for effective treat snoring and obstructive sleep apnea in perhaps 7 out of 10 people .  The best working devices are custom-made by a dental device  after a mold is made of the teeth 1, 2, 3.  When is an oral appliance indicated?  Oral appliance therapy is recommended as a first-line treatment for patients with primary snoring, mild sleep apnea, and for patients with moderate sleep apnea who prefer appliance therapy to use of CPAP4, 5. Severity of sleep apnea is determined by sleep testing and is based on the number of respiratory events per hour of sleep.   How successful is oral appliance therapy?  The success rate of oral appliance therapy in patients with mild sleep apnea is 75-80% while in patients with moderate sleep apnea it is 50-70%. The chance of success in patients with severe sleep apnea is 40-50%. The research also shows that oral appliances have a beneficial effect on the cardiovascular health of ALE patients at the same magnitude as CPAP therapy7.  Oral appliances should be a second-line treatment in cases of severe sleep apnea, but if not completely successful then a combination therapy utilizing CPAP plus oral appliance therapy may be effective. Oral appliances tend to be effective in a broad range of patients although studies show that the patients who have the highest success are females, younger patients, those with milder disease, and less severe obesity. 3, 6.   Finding a dentist that practices dental sleep medicine  Specific training is available through the American Academy of Dental Sleep Medicine for dentists interested in working in the field of sleep. To find a dentist who is educated in the field of sleep and the use of oral appliances, near you, visit the Web site of the American Academy of Dental Sleep Medicine.    References  1. wu Carrera al. Objectively  measured vs self-reported compliance during oral appliance therapy for sleep-disordered breathing. Chest 2013; 144(5): 8802-3964.  2. Jerry, et al. Objective measurement of compliance during oral appliance therapy for sleep-disordered breathing. Thorax 2013; 68(1): 91-96.  3. Arnol et al. Mandibular advancement devices in 620 men and women with ALE and snoring: tolerability and predictors of treatment success. Chest 2004; 125: 4384-7195.  4. Patrice et al. Oral appliances for snoring and ALE: a review. Sleep 2006; 29: 244-262.  5. Kasi et al. Oral appliance treatment for ALE: an update. J Clin Sleep Med 2014; 10(2): 215-227.  6. Clare et al. Predictors of OSAH treatment outcome. J Dent Res 2007; 86: 6557-1080.      Weight Loss:    Weight loss is a long-term strategy that may improve sleep apnea in some patients.    Weight management is a personal decision.  If you are interested in exploring weight loss strategies, the following discussion covers the impact on weight loss on sleep apnea and the approaches that may be successful.    Weight loss decreases severity of sleep apnea in most people with obesity. For those with mild obesity who have developed snoring with weight gain, even 15-30 pound weight loss can improve and occasionally eliminate sleep apnea.  Structured and life-long dietary and health habits are necessary to lose weight and keep healthier weight levels.     Though there may be significant health benefits from weight loss, long-term weight loss is very difficult to achieve- studies show success with dietary management in less than 10% of people. In addition, substantial weight loss may require years of dietary control and may be difficult if patients have severe obesity. In these cases, surgical management may be considered.  Finally, older individuals who have tolerated obesity without health complications may be less likely to benefit from weight loss strategies.       [unfilled]    Surgery:    Surgery for obstructive sleep apnea is considered generally only when other therapies fail to work. Surgery may be discussed with you if you are having a difficult time tolerating CPAP and or when there is an abnormal structure that requires surgical correction.  Nose and throat surgeries often enlarge the airway to prevent collapse.  Most of these surgeries create pain for 1-2 weeks and up to half of the most common surgeries are not effective throughout life.  You should carefully discuss the benefits and drawbacks to surgery with your sleep provider and surgeon to determine if it is the best solution for you.   More information  Surgery for ALE is directed at areas that are responsible for narrowing or complete obstruction of the airway during sleep.  There are a wide range of procedures available to enlarge and/or stabilize the airway to prevent blockage of breathing in the three major areas where it can occur: the palate, tongue, and nasal regions.  Successful surgical treatment depends on the accurate identification of the factors responsible for obstructive sleep apnea in each person.  A personalized approach is required because there is no single treatment that works well for everyone.  Because of anatomic variation, consultation with an examination by a sleep surgeon is a critical first step in determining what surgical options are best for each patient.  In some cases, examination during sedation may be recommended in order to guide the selection of procedures.  Patients will be counseled about risks and benefits as well as the typical recovery course after surgery. Surgery is typically not a cure for a person s ALE.  However, surgery will often significantly improve one s ALE severity (termed  success rate ).  Even in the absence of a cure, surgery will decrease the cardiovascular risk associated with OSA7; improve overall quality of life8 (sleepiness, functionality, sleep  quality, etc).      Palate Procedures:  Patients with ALE often have narrowing of their airway in the region of their tonsils and uvula.  The goals of palate procedures are to widen the airway in this region as well as to help the tissues resist collapse.  Modern palate procedure techniques focus on tissue conservation and soft tissue rearrangement, rather than tissue removal.  Often the uvula is preserved in this procedure. Residual sleep apnea is common in patient after pharyngoplasty with an average reduction in sleep apnea events of 33%2.      Tongue Procedures:  ExamWhile patients are awake, the muscles that surround the throat are active and keep this region open for breathing. These muscles relax during sleep, allowing the tongue and other structures to collapse and block breathing.  There are several different tongue procedures available.  Selection of a tongue base procedure depends on characteristics seen on physical exam.  Generally, procedures are aimed at removing bulky tissues in this area or preventing the back of the tongue from falling back during sleep.  Success rates for tongue surgery range from 50-62%3.    Hypoglossal Nerve Stimulation:  Hypoglossal nerve stimulation has recently received approval from the [place] Food and Drug Administration for the treatment of obstructive sleep apnea.  This is based on research showing that the system was safe and effective in treating sleep apnea6.  Results showed that the median AHI score decreased 68%, from 29.3 to 9.0. This therapy uses an implant system that senses breathing patterns and delivers mild stimulation to airway muscles, which keeps the airway open during sleep.  The system consists of three fully implanted components: a small generator (similar in size to a pacemaker), a breathing sensor, and a stimulation lead.  Using a small handheld remote, a patient turns the therapy on before bed and off upon awakening.    Candidates for this  device must be greater than 22 years of age, have moderate to severe ALE (AHI between 20-65), BMI less than 32, have tried CPAP/oral appliance without success, and have appropriate upper airway anatomy (determined by a sleep endoscopy performed by Dr. Rivera).    Hypoglossal Nerve Stimulation Pathway:    The sleep surgeon s office will work with the patient through the insurance prior-authorization process (including communications and appeals).    Nasal Procedures:  Nasal obstruction can interfere with nasal breathing during the day and night.  Studies have shown that relief of nasal obstruction can improve the ability of some patients to tolerate positive airway pressure therapy for obstructive sleep apnea1.  Treatment options include medications such as nasal saline, topical corticosteroid and antihistamine sprays, and oral medications such as antihistamines or decongestants. Non-surgical treatments can include external nasal dilators for selected patients. If these are not successful by themselves, surgery can improve the nasal airway either alone or in combination with these other options.      Combination Procedures:  Combination of surgical procedures and other treatments may be recommended, particularly if patients have more than one area of narrowing or persistent positional disease.  The success rate of combination surgery ranges from 66-80%2,3.    References  1. Fahad BALBUENA. The Role of the Nose in Snoring and Obstructive Sleep Apnoea: An Update.  Eur Arch Otorhinolaryngol. 2011; 268: 1365-73.  2.  Philly SM; Louis JA; Elsi JR; Pallanch JF; Shira MB; Melina SG; Marilyn MCLAIND. Surgical modifications of the upper airway for obstructive sleep apnea in adults: a systematic review and meta-analysis. SLEEP 2010;33(10):3202-5675. Edgardo ARRIOLA. Hypopharyngeal surgery in obstructive sleep apnea: an evidence-based medicine review.  Arch Otolaryngol Head Neck Surg. 2006 Feb;132(2):206-13.  3. Lon HUERTA, Isaak Nova  MARY. The efficacy of anatomically based multilevel surgery for obstructive sleep apnea. Otolaryngol Head Neck Surg. 2003 Oct;129(4):327-35.  4. Edgardo ARRIOLA, Goldberg A. Hypopharyngeal Surgery in Obstructive Sleep Apnea: An Evidence-Based Medicine Review. Arch Otolaryngol Head Neck Surg. 2006 Feb;132(2):206-13.  5. Katelynn PJ et al. Upper-Airway Stimulation for Obstructive Sleep Apnea.  N Engl J Med. 2014 Jan 9;370(2):139-49.  6. Miguel Y et al. Increased Incidence of Cardiovascular Disease in Middle-aged Men with Obstructive Sleep Apnea. Am J Respir Crit Care Med; 2002 166: 159-165  7. Terell EM et al. Studying Life Effects and Effectiveness of Palatopharyngoplasty (SLEEP) study: Subjective Outcomes of Isolated Uvulopalatopharyngoplasty. Otolaryngol Head Neck Surg. 2011; 144: 623-631.

## 2017-11-10 NOTE — PROGRESS NOTES
DATE OF VISIT:  11/08/2017      CHIEF COMPLAINT:  Ms. Romi Cortez has self-referred for difficulties with frequent snoring and waking.      HISTORY OF PRESENT ILLNESS:  Reports a 3-year history of trying weight loss to improve her quality of sleep.  She feels if there is no treatment that she might be at increased risk for dementia.  Family members report snoring, uncertain of the actual volume.  She does awaken with a snort or gasping arousal, has pauses in her breathing and trouble breathing through her nose.  Sleeps primarily on her left side and occasionally on her back.  All symptoms are worse on her back.  No signs of orexin deficiency, 2/7 nights has problems with feeling some restlessness in her legs.  This will come and go for a night or 2 and then be gone for a period of time.  Stretching often does help, but the 2 nights it is there it is harder to sleep.  No abnormal nighttime behaviors.      Admits to being a night owl.  Earliest bedtime is probably 11:30 to enter bed, otherwise between 12:00 and 1:00 p.m. is her routine.  Exits bed as early as 7:00 a.m. on work days, 2/5.  Most common time to wake up on workdays is between 8:30 and 9:00 and 9:00 a.m. on weekends.  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  Activities in bed do include some reading.  Wakes up several times per night, but usually can fall right back asleep.  Unsure why she does wake up.  She is not sure if it is her snoring.  Total sleep time on work nights 6 hours, 8 hours on weekends.  With wakeups will participate in some worry.  If she does take a nap, it is hard for her mind to turn off because her mind is too busy.  Activities in bed do include reading from bed, phone or computer and often she is reading from a book.      SOCIAL AND PERSONAL HISTORY:  She is , lives with her , is self-employed as a musician and artist.      SLEEP ENVIRONMENT:  Bed partner  can be disturbing to her sleep.      FAMILY HISTORY:  Brother and dad have been diagnosed.  Some have restless legs.      SUBSTANCES THAT AFFECT SLEEP:  Alcohol intake is rare.  No other recreational drugs.  Caffeine is rare and minimal.  Does exercise.  Her [place] Sleepiness Score is 3.  Denies difficulties with driving.  Does state that at times being tired or fatigued or sleepy can affect her performance.  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  Depression and anxiety is pretty good in the past 30 days.      REVIEW OF SYSTEMS:  A 14-point comprehensive review of systems completed and negative with the exception of the following:   EYES:  Some changes in vision.   EARS, NOSE AND THROAT:  Sore throat.   NERVOUS SYSTEM:  Headaches at times and numbness in arms.  She does play the violin.     SKIN:  Rashes she is not too sure.   LUNGS:  She is in good condition, can do 2 flights of stairs.   MENTAL HEALTH:  She is bipolar.      SURGICAL HISTORY:  D&C, dental, appendectomy.      PAST MEDICAL HISTORY:     1.  Vertigo.     2.  Back problems which has resolved.     3.  Neck and right arm pain.     4.  Hand numbness.     5.  History of menstrual difficulties earlier in her life.     6.  Bipolar II with depression and anxiety.    7.  Staph infection.   8.  ADD.   9.  Two pregnancies.   10.  Measles, chickenpox, etc.        MEDICATIONS:  She is on sertraline and lamotrigine.     Allergies:    No Known Allergies    Medications:    Current Outpatient Prescriptions   Medication Sig Dispense Refill     triamcinolone (KENALOG) 0.025 % ointment Apply topically 2 times daily       ARTIFICIAL TEAR OP Apply 1 drop to eye as needed Both eyes, 1-2 times a month       sertraline (ZOLOFT) 100 MG tablet Take by mouth daily       LamoTRIgine (LAMICTAL PO) Take 200 mg by mouth daily         Problem  "List:  Patient Active Problem List    Diagnosis Date Noted     Dermatitis, seborrheic 09/15/2015     Priority: Medium     AK (actinic keratosis) 09/15/2015     Priority: Medium     Senile sebaceous gland hyperplasia 09/15/2015     Priority: Medium     Lentigines 09/15/2015     Priority: Medium     Appendicitis 11/21/2013     Priority: Medium        Past Medical/Surgical History:  No past medical history on file.  Past Surgical History:   Procedure Laterality Date     D & C  1996     LAPAROSCOPIC APPENDECTOMY  11/21/2013    Procedure: LAPAROSCOPIC APPENDECTOMY;  Laparoscopic Appendectomy;  Surgeon: Sung Rodriguez MD;  Location: [facility] OR       Physical Examination:  Vitals: /84  Pulse 67  Resp 18  Ht 1.676 m (5' 6\")  Wt 97.1 kg (214 lb)  SpO2 96%  BMI 34.54 kg/m2  BMI= Body mass index is 34.54 kg/(m^2).    Neck Cir (cm): 37 cm    [place] Total Score 11/8/2017   Total score - [place] 3       GENERAL APPEARANCE: healthy, alert and no distress  EYES: Eyes grossly normal to inspection and frequent blinking  HENT: oropharynx crowded, soft palate dependent, tongue base enlarged and nares patent  NECK: thyroid normal to palpation  RESP: lungs clear to auscultation - no rales, rhonchi or wheezes  CV: regular rates and rhythm, normal S1 S2, no S3 or S4 and no murmur, click or rub  Extremities are without clubbing, edema  Musculoskeletal:Moves without difficulty  Mallampati Class: IV.  Tonsillar Stage: 1  hidden by pillars.  Dental: Dentition in good condition.  Good advancement of lower jaw without tmd  Skin: with facial blemishes      ASSESSMENT AND PLAN:     1.  It is my impression that Ms. Cortez has a mild pretest probability for obstructive sleep apnea.  Currently with the data we have today her pretest probability is 3/8 for the STOP-Bang questionnaire.  She was unaware of the volume of her snoring and is getting more information.  Once she has more information, she was given the price line number to " call for the cost of a polysomnogram or an HST and then call insurance regarding reimbursement.  She will MyChart me regarding her choice of moving forward.   2.  Delayed sleep phase disorder likely impacting her ability to fall asleep at a decent time and then on occasion having difficulty meeting her early morning requirements resulting in insufficient sleep.  We talked about the impact of insufficient sleep on the development of cognitive difficulties down the road and she is aware of the importance of protecting that morning sleep time.   3.  Obesity.  We discussed the link between being overweight and the development of obstructive sleep apnea and she is aware of her need to lose weight at this time.      Thank you for allowing me to participate in this kind woman's care.      Time spent with patient 60 minutes of which greater than 50% was spent in counseling, education and coordination of care.         JACK MOONEY, CNP             D: 2017 18:12   T: 2017 23:51   MT:       Name:     LYUDMILA RAPHAEL   MRN:      [id number]        Account:      [id number]   :      1960           Visit Date:   2017      Document: [id number]

## 2017-11-10 NOTE — PROGRESS NOTES
"Allergies:    No Known Allergies    Medications:    Current Outpatient Prescriptions   Medication Sig Dispense Refill     triamcinolone (KENALOG) 0.025 % ointment Apply topically 2 times daily       ARTIFICIAL TEAR OP Apply 1 drop to eye as needed Both eyes, 1-2 times a month       sertraline (ZOLOFT) 100 MG tablet Take by mouth daily       LamoTRIgine (LAMICTAL PO) Take 200 mg by mouth daily         Problem List:  Patient Active Problem List    Diagnosis Date Noted     Dermatitis, seborrheic 09/15/2015     Priority: Medium     AK (actinic keratosis) 09/15/2015     Priority: Medium     Senile sebaceous gland hyperplasia 09/15/2015     Priority: Medium     Lentigines 09/15/2015     Priority: Medium     Appendicitis 11/21/2013     Priority: Medium        Past Medical/Surgical History:  No past medical history on file.  Past Surgical History:   Procedure Laterality Date     D & C  1996     LAPAROSCOPIC APPENDECTOMY  11/21/2013    Procedure: LAPAROSCOPIC APPENDECTOMY;  Laparoscopic Appendectomy;  Surgeon: Sung Rodriguez MD;  [facility]           Physical Examination:  Vitals: /84  Pulse 67  Resp 18  Ht 1.676 m (5' 6\")  Wt 97.1 kg (214 lb)  SpO2 96%  BMI 34.54 kg/m2  BMI= Body mass index is 34.54 kg/(m^2).    Neck Cir (cm): 37 cm    [place] Total Score 11/8/2017   Total score - [place] 3       {SHORT EXAM:827984}  Musculoskeletal:  Mallampati Class: {DIMITRI NUMERAL I-IV:236078}.  Tonsillar Stage: {NUMBERS 1-4:477486}.  Dental:         CC: No ref. provider found        "

## 2017-12-14 ENCOUNTER — DOCUMENTATION ONLY (OUTPATIENT)
Dept: SLEEP MEDICINE | Facility: CLINIC | Age: 57
End: 2017-12-14

## 2017-12-21 ENCOUNTER — TELEPHONE (OUTPATIENT)
Dept: SLEEP MEDICINE | Facility: CLINIC | Age: 57
End: 2017-12-21

## 2018-03-14 ENCOUNTER — OFFICE VISIT (OUTPATIENT)
Dept: DERMATOLOGY | Facility: CLINIC | Age: 58
End: 2018-03-14
Payer: MEDICAID

## 2018-03-14 DIAGNOSIS — D18.01 CHERRY ANGIOMA: ICD-10-CM

## 2018-03-14 DIAGNOSIS — Z80.8 FAMILY HISTORY OF MELANOMA: ICD-10-CM

## 2018-03-14 DIAGNOSIS — L30.9 ECZEMA, UNSPECIFIED TYPE: ICD-10-CM

## 2018-03-14 DIAGNOSIS — L82.1 SEBORRHEIC KERATOSIS: ICD-10-CM

## 2018-03-14 DIAGNOSIS — L30.9 ECZEMA OF BOTH HANDS: ICD-10-CM

## 2018-03-14 DIAGNOSIS — Z12.83 SKIN CANCER SCREENING: Primary | ICD-10-CM

## 2018-03-14 DIAGNOSIS — L21.9 DERMATITIS, SEBORRHEIC: ICD-10-CM

## 2018-03-14 RX ORDER — TRIAMCINOLONE ACETONIDE 1 MG/G
OINTMENT TOPICAL 2 TIMES DAILY
Qty: 80 G | Refills: 3 | Status: SHIPPED | OUTPATIENT
Start: 2018-03-14 | End: 2018-08-13

## 2018-03-14 ASSESSMENT — PAIN SCALES - GENERAL: PAINLEVEL: NO PAIN (0)

## 2018-03-14 NOTE — LETTER
"3/14/2018       RE: Romi Cortez  [address] [facility] [address]     Dear Colleague,    Thank you for referring your patient, Romi Cortez, to the [facility]. Please see a copy of my visit note below.    [facility] D[facility]atology Note    Dermatology Problem List:  1. AKs s/p cryo  2. Seborrheic dermatitis  - ketoconazole 2% shampoo  3. Eczema - hands  - triamcinolone 0.1% ointment  4. Skin cancer screening 3/14/18    CC:   Chief Complaint   Patient presents with     Skin Check     SKin check, Romi states \" I have one new spot but I cant remember where it is.\"     Date of Service: Mar 14, 2018    History of Present Illness:  Ms. Romi Cortez is a 58 year old female who presents for a skin check. The patient was last seen on 2/2/17 by Dr. Bazan when actinic keratoses were treated with cryotherapy and she started ketoconazole shampoo and desonide cream for seborrheic dermatitis. Today the patient reports that she does have a new spot below her nose that is a different texture and feels slightly raw. It was very red a couple months ago, but today it is looking better. She does not get cold sores. She also reports that she has some irritation on her fingers that becomes very inflamed and cracks around her fingernails. She does wash her hands constantly, but always uses lotion after washing. She also reports that she has some dry skin and cracking behind her ears. Overall she is feeling well and is in good health. She notes her brother was recently diagnosed with melanoma which encouraged her to make an appointment. The patient reports no other lesions of concern at this time.    Otherwise, the patient reports no painful, bleeding, nonhealing, or pruritic lesions, and denies new or changing moles.    Past Medical History:   Patient Active Problem List   Diagnosis     Appendicitis     Dermatitis, seborrheic     AK " (actinic keratosis)     Senile sebaceous gland hyperplasia     Lentigines     History reviewed. No pertinent past medical history.  Past Surgical History:   Procedure Laterality Date     D & C  1996     LAPAROSCOPIC APPENDECTOMY  11/21/2013    Procedure: LAPAROSCOPIC APPENDECTOMY;  Laparoscopic Appendectomy;  Surgeon: Sung Rodriguez MD;  Location:  OR     Social History:  Patient has had a lot of sun exposure growing up, many blistering sunburns.  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    Family History:  Family history of NMSC in mother.  Brother has melanoma.   No known family history of psoriasis.    Medications:  Current Outpatient Prescriptions   Medication Sig Dispense Refill     ARTIFICIAL TEAR OP Apply 1 drop to eye as needed Both eyes, 1-2 times a month       sertraline (ZOLOFT) 100 MG tablet Take by mouth daily       LamoTRIgine (LAMICTAL PO) Take 200 mg by mouth daily       triamcinolone (KENALOG) 0.025 % ointment Apply topically 2 times daily       Allergies:  No Known Allergies     Review of Systems:  - Skin: As above in HPI. No additional skin concerns.  - No fevers, chills, night sweats, chronic cough    Physical exam:  Vitals: There were no vitals taken for this visit.  GEN: This is a well developed, well-nourished female in no acute distress, in a pleasant mood.      SKIN: Full skin, which includes the head/face, both arms, chest, back, abdomen,both legs, genitalia and/or groin buttocks, digits and/or nails, was examined.  - Pink scaly plaques on the dorsal hands, dorsal toes  - Xerosis and excoriations to bilateral dorsal arms  - Mild scale noted throughout scalp with few areas of excoriation  - Freckling on upper back  - There are dome shaped bright red papules on the back, abdomen.   - Stuck on tan to brown papules on the trunk  - No other lesions of concern on areas examined.     Impression/Plan:  1. Family history of melanoma in the patient's brother  - Discussion to continue with regular skin  checks  - Sun precaution was advised including the use of sun screens of SPF 30 or higher, sun protective clothing, and avoidance of tanning beds.  - ABCDEs of melanoma were discussed and self skin checks were advised.     2. Eczema - hands  - Start triamcinolone 0.1% ointment - apply to affected areas on hands and behind ears for 2 weeks, take 1 week break. Restart if needed.  - Recommend applying moisturizer immediately after the shower.  -Return to clinic if this is not helping.      3. Seborrheic dermatitis  - Continue ketoconazole 2% shampoo - apply to scalp in the shower.    4. Solar lentigines - upper back  - No further intervention required. Patient to report changes.     5. Cherry angiomas - back, abdomen  - No further intervention required. Patient to report changes.    6. Seborrheic keratoses - trunk  - No further intervention required. Patient to report changes.    Follow-up in 1 year, earlier for new or changing lesions.    Staff Involved:  Staff Only    Scribe Disclosure:   I, Daniel Mcknight, am serving as a scribe to document services personally performed by Tran Cool PA-C, based on data collection and the provider's statements to me.  Provider Disclosure:   The documentation recorded by the scribe accurately reflects the services I personally performed and the decisions made by me.    All risks, benefits and alternatives were discussed with patient.  Patient is in agreement and understands the assessment and plan.  All questions were answered.  Sun Screen Education was given.   Return to Clinic annually or sooner as needed.   Tran Cool PA-C   [facility]

## 2018-03-14 NOTE — MR AVS SNAPSHOT
After Visit Summary   3/14/2018    Romi Cortez    MRN: [id number]           Patient Information     Date Of Birth          1960        Visit Information        Provider Department      3/14/2018 12:30 PM Tran Cool PA-C [facility] Dermatology        Today's Diagnoses     Skin cancer screening    -  1    Dermatitis, seborrheic        Seborrheic keratosis        Cherry angioma        Eczema of both hands        Eczema, unspecified type          Care Instructions    Preventive Care:    Colorectal Cancer Screening: During our visit today, we discussed that it is recommended you receive colorectal cancer screening. Please call or make an appointment with your primary care provider to discuss this. You may also call the [facility] scheduling line ([phone]) to set up a colonoscopy appointment.              Follow-ups after your visit        Who to contact     Please call your clinic at [phone] to:    Ask questions about your health    Make or cancel appointments    Discuss your medicines    Learn about your test results    Speak to your doctor            Additional Information About Your Visit        Se[employer] Information     [employer]m gives you secure access to your electronic health record. If you see a primary care provider, you can also send messages to your care team and make appointments. If you have questions, please call your primary care clinic.  If you do not have a primary care provider, please call [phone] and they will assist you.      Se[employer]m is an electronic gateway that provides easy, online access to your medical records. With [employer], you can request a clinic appointment, read your test results, renew a prescription or communicate with your care team.     To access your existing account, please contact your [facility] or call [phone] for assistance.        Care EveryWhere ID     This is your Care EveryWhere ID. This could be  used by other organizations to access your [facility] medical records  [id number]         Blood Pressure from Last 3 Encounters:   11/08/17 128/84   11/21/13 104/71    Weight from Last 3 Encounters:   11/08/17 97.1 kg (214 lb)   11/20/13 88.9 kg (196 lb)              Today, you had the following     No orders found for display         Today's Medication Changes          These changes are accurate as of 3/14/18  1:10 PM.  If you have any questions, ask your nurse or doctor.               These medicines have changed or have updated prescriptions.        Dose/Directions    * triamcinolone 0.025 % ointment   Commonly known as:  KENALOG   This may have changed:  Another medication with the same name was added. Make sure you understand how and when to take each.   Changed by:  Tran Cool PA-C        Apply topically 2 times daily   Refills:  0       * triamcinolone 0.1 % ointment   Commonly known as:  KENALOG   This may have changed:  You were already taking a medication with the same name, and this prescription was added. Make sure you understand how and when to take each.   Used for:  Eczema of both hands   Changed by:  Tran Cool PA-C        Apply topically 2 times daily To your affected areas on your hands, arms, toes and behind your ears.   Quantity:  80 g   Refills:  3       * Notice:  This list has 2 medication(s) that are the same as other medications prescribed for you. Read the directions carefully, and ask your doctor or other care provider to review them with you.         Where to get your medicines      These medications were sent to [place] [address] [place] [facility] [address]  [address] [address]     Phone:  [phone]     triamcinolone 0.1 % ointment                Primary Care Provider Office Phone # Fax #    Mayra Persaud [phone]       [facility] [address] [address]        Equal Access to  Services     [facility]: Hadii aad ku hadangellasusannah Sukiwilver, wamaritzada luqadaha, qaybta kaalmagarett smiley, anthony ryan. So [facility] [phone].    ATENCIÓN: Si hattiela cirilo, tiene a arellano disposición servicios gratuitos de asistencia lingüística. Llame al [phone].    We comply with applicable federal civil rights laws and [place] laws. We do not discriminate on the basis of race, color, national origin, age, disability, sex, sexual orientation, or gender identity.            Thank you!     Thank you for choosing [facility] DERMATOLOGY  for your care. Our goal is always to provide you with excellent care. Hearing back from our patients is one way we can continue to improve our services. Please take a few minutes to complete the written survey that you may receive in the mail after your visit with us. Thank you!             Your Updated Medication List - Protect others around you: Learn how to safely use, store and throw away your medicines at www.disposemymeds.org.          This list is accurate as of 3/14/18  1:10 PM.  Always use your most recent med list.                   Brand Name Dispense Instructions for use Diagnosis    ARTIFICIAL TEAR OP      Apply 1 drop to eye as needed Both eyes, 1-2 times a month        LAMICTAL PO      Take 200 mg by mouth daily        sertraline 100 MG tablet    ZOLOFT     Take by mouth daily        * triamcinolone 0.025 % ointment    KENALOG     Apply topically 2 times daily        * triamcinolone 0.1 % ointment    KENALOG    80 g    Apply topically 2 times daily To your affected areas on your hands, arms, toes and behind your ears.    Eczema of both hands       * Notice:  This list has 2 medication(s) that are the same as other medications prescribed for you. Read the directions carefully, and ask your doctor or other care provider to review them with you.

## 2018-03-14 NOTE — PATIENT INSTRUCTIONS
Preventive Care:    Colorectal Cancer Screening: During our visit today, we discussed that it is recommended you receive colorectal cancer screening. Please call or make an appointment with your primary care provider to discuss this. You may also call the [facility] scheduling line ([phone]) to set up a colonoscopy appointment.

## 2018-03-14 NOTE — PROGRESS NOTES
"[facility] Dermatology Note    Dermatology Problem List:  1. AKs s/p cryo  2. Seborrheic dermatitis  - ketoconazole 2% shampoo  3. Eczema - hands  - triamcinolone 0.1% ointment  4. Skin cancer screening 3/14/18    CC:   Chief Complaint   Patient presents with     Skin Check     SKin check, Romi states \" I have one new spot but I cant remember where it is.\"     Date of Service: Mar 14, 2018    History of Present Illness:  Ms. Romi Cortez is a 58 year old female who presents for a skin check. The patient was last seen on 2/2/17 by Dr. Bazan when actinic keratoses were treated with cryotherapy and she started ketoconazole shampoo and desonide cream for seborrheic dermatitis. Today the patient reports that she does have a new spot below her nose that is a different texture and feels slightly raw. It was very red a couple months ago, but today it is looking better. She does not get cold sores. She also reports that she has some irritation on her fingers that becomes very inflamed and cracks around her fingernails. She does wash her hands constantly, but always uses lotion after washing. She also reports that she has some dry skin and cracking behind her ears. Overall she is feeling well and is in good health. She notes her brother was recently diagnosed with melanoma which encouraged her to make an appointment. The patient reports no other lesions of concern at this time.    Otherwise, the patient reports no painful, bleeding, nonhealing, or pruritic lesions, and denies new or changing moles.    Past Medical History:   Patient Active Problem List   Diagnosis     Appendicitis     Dermatitis, seborrheic     AK (actinic keratosis)     Senile sebaceous gland hyperplasia     Lentigines     History reviewed. No pertinent past medical history.  Past Surgical History:   Procedure Laterality Date     D & C  1996     LAPAROSCOPIC APPENDECTOMY  11/21/2013    Procedure: LAPAROSCOPIC APPENDECTOMY;  " Laparoscopic Appendectomy;  Surgeon: Sung Rodriguez MD;  Location:  OR     Social History:  Patient has had a lot of sun exposure growing up, many blistering sunburns.  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    Family History:  Family history of NMSC in mother.  Brother has melanoma.   No known family history of psoriasis.    Medications:  Current Outpatient Prescriptions   Medication Sig Dispense Refill     ARTIFICIAL TEAR OP Apply 1 drop to eye as needed Both eyes, 1-2 times a month       sertraline (ZOLOFT) 100 MG tablet Take by mouth daily       LamoTRIgine (LAMICTAL PO) Take 200 mg by mouth daily       triamcinolone (KENALOG) 0.025 % ointment Apply topically 2 times daily       Allergies:  No Known Allergies     Review of Systems:  - Skin: As above in HPI. No additional skin concerns.  - No fevers, chills, night sweats, chronic cough    Physical exam:  Vitals: There were no vitals taken for this visit.  GEN: This is a well developed, well-nourished female in no acute distress, in a pleasant mood.      SKIN: Full skin, which includes the head/face, both arms, chest, back, abdomen,both legs, genitalia and/or groin buttocks, digits and/or nails, was examined.  - Pink scaly plaques on the dorsal hands, dorsal toes  - Xerosis and excoriations to bilateral dorsal arms  - Mild scale noted throughout scalp with few areas of excoriation  - Freckling on upper back  - There are dome shaped bright red papules on the back, abdomen.   - Stuck on tan to brown papules on the trunk  - No other lesions of concern on areas examined.     Impression/Plan:  1. Family history of melanoma in the patient's brother  - Discussion to continue with regular skin checks  - Sun precaution was advised including the use of sun screens of SPF 30 or higher, sun protective clothing, and avoidance of tanning beds.  - ABCDEs of melanoma were discussed and self skin checks were advised.     2. Eczema - hands  - Start triamcinolone 0.1% ointment -  apply to affected areas on hands and behind ears for 2 weeks, take 1 week break. Restart if needed.  - Recommend applying moisturizer immediately after the shower.  -Return to clinic if this is not helping.      3. Seborrheic dermatitis  - Continue ketoconazole 2% shampoo - apply to scalp in the shower.    4. Solar lentigines - upper back  - No further intervention required. Patient to report changes.     5. Cherry angiomas - back, abdomen  - No further intervention required. Patient to report changes.    6. Seborrheic keratoses - trunk  - No further intervention required. Patient to report changes.    Follow-up in 1 year, earlier for new or changing lesions.    Staff Involved:  Staff Only    Scribe Disclosure:   I, Daniel Mcknight, am serving as a scribe to document services personally performed by Tran Cool PA-C, based on data collection and the provider's statements to me.  Provider Disclosure:   The documentation recorded by the scribe accurately reflects the services I personally performed and the decisions made by me.    All risks, benefits and alternatives were discussed with patient.  Patient is in agreement and understands the assessment and plan.  All questions were answered.  Sun Screen Education was given.   Return to Clinic annually or sooner as needed.   Tran Cool PA-C   [facility]

## 2018-03-14 NOTE — NURSING NOTE
"Dermatology Rooming Note    Romi Cortez's goals for this visit include:   Chief Complaint   Patient presents with     Skin Check     SKin check, Romi states \" I have one new spot but I cant remember where it is.\"     Akilah Houston LPN  "

## 2018-03-14 NOTE — PROGRESS NOTES
"[facility] Dermatology Note    Dermatology Problem List:  1. AKs s/p cryo  2. Seborrheic dermatitis  - ketoconazole 2% shampoo, desonide 0.05% cream  3. ***  4. Skin cancer screening 3/14/18    CC:   Chief Complaint   Patient presents with     Skin Check     SKin check, Romi states \" I have one new spot but I cant remember where it is.\"     Date of Service: Mar 14, 2018    History of Present Illness:  Ms. Romi Cortez is a 58 year old female who presents for a skin check. The patient was last seen on 2/2/17 by Dr. Bazan when actinic keratoses were treated with cryotherapy and she started ketoconazole shampoo and desonide cream for seborrheic dermatitis. Today the patient reports ***. The patient reports no other lesions of concern at this time.    Otherwise, the patient reports no painful, bleeding, nonhealing, or pruritic lesions, and denies new or changing moles.    Past Medical History:   Patient Active Problem List   Diagnosis     Appendicitis     Dermatitis, seborrheic     AK (actinic keratosis)     Senile sebaceous gland hyperplasia     Lentigines     History reviewed. No pertinent past medical history.  Past Surgical History:   Procedure Laterality Date     D & C  1996     LAPAROSCOPIC APPENDECTOMY  11/21/2013    Procedure: LAPAROSCOPIC APPENDECTOMY;  Laparoscopic Appendectomy;  Surgeon: Sung Rodriguez MD;  Location:  OR     Social History:  ***    Family History:  Family history of NMSC in mother.  Brother has melanoma.     Medications:  Current Outpatient Prescriptions   Medication Sig Dispense Refill     ARTIFICIAL TEAR OP Apply 1 drop to eye as needed Both eyes, 1-2 times a month       sertraline (ZOLOFT) 100 MG tablet Take by mouth daily       LamoTRIgine (LAMICTAL PO) Take 200 mg by mouth daily       triamcinolone (KENALOG) 0.025 % ointment Apply topically 2 times daily       Allergies:  No Known Allergies     Review of Systems:  - Skin: As above in HPI. No " additional skin concerns.    Physical exam:  Vitals: There were no vitals taken for this visit.  GEN: This is a well developed, well-nourished female in no acute distress, in a pleasant mood.      SKIN: {Skin Exam:827506}  - ***  - No other lesions of concern on areas examined.     Impression/Plan:  1. ***  - ***      Follow-up in ***, earlier for new or changing lesions.    Staff Involved:  Staff Only    Scribe Disclosure:   I, Daniel Mcknight, am serving as a scribe to document services personally performed by Tran Cool PA-C, based on data collection and the provider's statements to me.

## 2018-04-13 ENCOUNTER — MEDICAL CORRESPONDENCE (OUTPATIENT)
Dept: HEALTH INFORMATION MANAGEMENT | Facility: CLINIC | Age: 58
End: 2018-04-13

## 2018-04-16 ENCOUNTER — THERAPY VISIT (OUTPATIENT)
Dept: PHYSICAL THERAPY | Facility: CLINIC | Age: 58
End: 2018-04-16
Payer: MEDICAID

## 2018-04-16 DIAGNOSIS — G89.29 CHRONIC PAIN OF BOTH SHOULDERS: ICD-10-CM

## 2018-04-16 DIAGNOSIS — M25.512 CHRONIC PAIN OF BOTH SHOULDERS: ICD-10-CM

## 2018-04-16 DIAGNOSIS — M54.2 CERVICALGIA: Primary | ICD-10-CM

## 2018-04-16 DIAGNOSIS — M25.511 CHRONIC PAIN OF BOTH SHOULDERS: ICD-10-CM

## 2018-04-16 PROCEDURE — 97110 THERAPEUTIC EXERCISES: CPT | Mod: GP | Performed by: PHYSICAL THERAPIST

## 2018-04-16 PROCEDURE — 97161 PT EVAL LOW COMPLEX 20 MIN: CPT | Mod: GP | Performed by: PHYSICAL THERAPIST
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
## 2018-04-17 PROBLEM — M25.512 CHRONIC PAIN OF BOTH SHOULDERS: Status: ACTIVE | Noted: 2018-04-17

## 2018-04-17 PROBLEM — M54.2 CERVICALGIA: Status: ACTIVE | Noted: 2018-04-17

## 2018-04-17 PROBLEM — M25.511 CHRONIC PAIN OF BOTH SHOULDERS: Status: ACTIVE | Noted: 2018-04-17

## 2018-04-17 PROBLEM — G89.29 CHRONIC PAIN OF BOTH SHOULDERS: Status: ACTIVE | Noted: 2018-04-17

## 2018-04-24 ENCOUNTER — TELEPHONE (OUTPATIENT)
Dept: OTHER | Facility: CLINIC | Age: 58
End: 2018-04-24

## 2018-04-27 ENCOUNTER — THERAPY VISIT (OUTPATIENT)
Dept: PHYSICAL THERAPY | Facility: CLINIC | Age: 58
End: 2018-04-27
Payer: MEDICAID

## 2018-04-27 DIAGNOSIS — M54.2 CERVICALGIA: ICD-10-CM

## 2018-04-27 DIAGNOSIS — M25.512 CHRONIC PAIN OF BOTH SHOULDERS: ICD-10-CM

## 2018-04-27 DIAGNOSIS — G89.29 CHRONIC PAIN OF BOTH SHOULDERS: ICD-10-CM

## 2018-04-27 DIAGNOSIS — M25.511 CHRONIC PAIN OF BOTH SHOULDERS: ICD-10-CM

## 2018-04-27 PROCEDURE — 97140 MANUAL THERAPY 1/> REGIONS: CPT | Mod: GP | Performed by: PHYSICAL THERAPIST

## 2018-04-27 PROCEDURE — 97110 THERAPEUTIC EXERCISES: CPT | Mod: GP | Performed by: PHYSICAL THERAPIST

## 2018-04-30 ENCOUNTER — TELEPHONE (OUTPATIENT)
Dept: SLEEP MEDICINE | Facility: CLINIC | Age: 58
End: 2018-04-30

## 2018-04-30 NOTE — TELEPHONE ENCOUNTER
Patient calling to schedule sleep study. Patient has follow up appointment on May 3, does she need to keep this or can she do the study from visit on 11/2017?

## 2018-05-02 ENCOUNTER — THERAPY VISIT (OUTPATIENT)
Dept: PHYSICAL THERAPY | Facility: CLINIC | Age: 58
End: 2018-05-02
Payer: COMMERCIAL

## 2018-05-02 DIAGNOSIS — M25.512 CHRONIC PAIN OF BOTH SHOULDERS: ICD-10-CM

## 2018-05-02 DIAGNOSIS — M25.511 CHRONIC PAIN OF BOTH SHOULDERS: ICD-10-CM

## 2018-05-02 DIAGNOSIS — M54.2 CERVICALGIA: ICD-10-CM

## 2018-05-02 DIAGNOSIS — G89.29 CHRONIC PAIN OF BOTH SHOULDERS: ICD-10-CM

## 2018-05-02 PROCEDURE — 97140 MANUAL THERAPY 1/> REGIONS: CPT | Mod: GP | Performed by: PHYSICAL THERAPIST

## 2018-05-02 PROCEDURE — 97110 THERAPEUTIC EXERCISES: CPT | Mod: GP | Performed by: PHYSICAL THERAPIST

## 2018-05-02 PROCEDURE — 97112 NEUROMUSCULAR REEDUCATION: CPT | Mod: GP | Performed by: PHYSICAL THERAPIST

## 2018-05-10 ENCOUNTER — THERAPY VISIT (OUTPATIENT)
Dept: PHYSICAL THERAPY | Facility: CLINIC | Age: 58
End: 2018-05-10
Payer: COMMERCIAL

## 2018-05-10 DIAGNOSIS — G89.29 CHRONIC PAIN OF BOTH SHOULDERS: ICD-10-CM

## 2018-05-10 DIAGNOSIS — M25.511 CHRONIC PAIN OF BOTH SHOULDERS: ICD-10-CM

## 2018-05-10 DIAGNOSIS — M25.512 CHRONIC PAIN OF BOTH SHOULDERS: ICD-10-CM

## 2018-05-10 DIAGNOSIS — M54.2 CERVICALGIA: ICD-10-CM

## 2018-05-10 PROCEDURE — 97140 MANUAL THERAPY 1/> REGIONS: CPT | Mod: GP | Performed by: PHYSICAL THERAPIST

## 2018-05-10 PROCEDURE — 97112 NEUROMUSCULAR REEDUCATION: CPT | Mod: GP | Performed by: PHYSICAL THERAPIST

## 2018-05-10 PROCEDURE — 97110 THERAPEUTIC EXERCISES: CPT | Mod: GP | Performed by: PHYSICAL THERAPIST

## 2018-05-11 ENCOUNTER — MYC MEDICAL ADVICE (OUTPATIENT)
Dept: SLEEP MEDICINE | Facility: CLINIC | Age: 58
End: 2018-05-11

## 2018-05-14 ENCOUNTER — RADIANT APPOINTMENT (OUTPATIENT)
Dept: MRI IMAGING | Facility: CLINIC | Age: 58
End: 2018-05-14
Attending: INTERNAL MEDICINE
Payer: COMMERCIAL

## 2018-05-14 DIAGNOSIS — R20.2 NUMBNESS AND TINGLING: ICD-10-CM

## 2018-05-14 DIAGNOSIS — R20.0 NUMBNESS AND TINGLING: ICD-10-CM

## 2018-05-14 DIAGNOSIS — M54.2 NECK PAIN: ICD-10-CM

## 2018-05-14 DIAGNOSIS — M79.603 PAIN OF UPPER EXTREMITY, UNSPECIFIED LATERALITY: ICD-10-CM

## 2018-05-14 RX ORDER — GADOBUTROL 604.72 MG/ML
10 INJECTION INTRAVENOUS ONCE
Status: COMPLETED | OUTPATIENT
Start: 2018-05-14 | End: 2018-05-14

## 2018-05-14 RX ADMIN — GADOBUTROL 10 ML: 604.72 INJECTION INTRAVENOUS at 19:31

## 2018-05-15 NOTE — DISCHARGE INSTRUCTIONS
MRI Contrast Discharge Instructions    The IV contrast you received today will pass out of your body in your  urine. This will happen in the next 24 hours. You will not feel this process.  Your urine will not change color.    Drink at least 4 extra glasses of water or juice today (unless your doctor  has restricted your fluids). This reduces the stress on your kidneys.  You may take your regular medicines.    If you are on dialysis: It is best to have dialysis today.    If you have a reaction: Most reactions happen right away. If you have  any new symptoms after leaving the hospital (such as hives or swelling),  call your hospital at the correct number below. Or call your family doctor.  If you have breathing distress or wheezing, call 911.    Special instructions: ***    I have read and understand the above information.    Signature:______________________________________ Date:___________    Staff:__________________________________________ Date:___________     Time:__________    [address] Radiology Departments:    ___[place]: [phone]  ___[facility]: [phone]  ___[place]: [phone] ___[facility]: [phone]  ___[facility]: [phone]  ___[facility]: [phone]  ___[phone]  ___[facility]: [phone]  ___[phone]

## 2018-05-18 DIAGNOSIS — E66.811 OBESITY (BMI 30.0-34.9): ICD-10-CM

## 2018-05-18 DIAGNOSIS — R06.83 SNORING: Primary | ICD-10-CM

## 2018-05-18 DIAGNOSIS — G47.21 CIRCADIAN RHYTHM SLEEP DISORDER, DELAYED SLEEP PHASE TYPE: ICD-10-CM

## 2018-05-18 RX ORDER — ZOLPIDEM TARTRATE 5 MG/1
TABLET ORAL
Qty: 1 TABLET | Refills: 0 | Status: SHIPPED | OUTPATIENT
Start: 2018-05-18 | End: 2018-08-13

## 2018-05-21 NOTE — PROGRESS NOTES
Rx called into the pharmacy on 5/21/2018 at 4:20 PM. Per the note given by Fiona Yen. RX was called into the [facility] pharmacy at [place].    Manda Ojeda [facility]

## 2018-05-23 ENCOUNTER — THERAPY VISIT (OUTPATIENT)
Dept: PHYSICAL THERAPY | Facility: CLINIC | Age: 58
End: 2018-05-23
Payer: COMMERCIAL

## 2018-05-23 DIAGNOSIS — M25.512 CHRONIC PAIN OF BOTH SHOULDERS: ICD-10-CM

## 2018-05-23 DIAGNOSIS — M54.2 CERVICALGIA: ICD-10-CM

## 2018-05-23 DIAGNOSIS — M25.511 CHRONIC PAIN OF BOTH SHOULDERS: ICD-10-CM

## 2018-05-23 DIAGNOSIS — G89.29 CHRONIC PAIN OF BOTH SHOULDERS: ICD-10-CM

## 2018-05-23 PROCEDURE — 97112 NEUROMUSCULAR REEDUCATION: CPT | Mod: GP | Performed by: PHYSICAL THERAPIST

## 2018-05-23 PROCEDURE — 97110 THERAPEUTIC EXERCISES: CPT | Mod: GP | Performed by: PHYSICAL THERAPIST

## 2018-05-24 ENCOUNTER — TRANSFERRED RECORDS (OUTPATIENT)
Dept: HEALTH INFORMATION MANAGEMENT | Facility: CLINIC | Age: 58
End: 2018-05-24

## 2018-05-24 ENCOUNTER — MEDICAL CORRESPONDENCE (OUTPATIENT)
Dept: HEALTH INFORMATION MANAGEMENT | Facility: CLINIC | Age: 58
End: 2018-05-24

## 2018-06-08 ENCOUNTER — THERAPY VISIT (OUTPATIENT)
Dept: PHYSICAL THERAPY | Facility: CLINIC | Age: 58
End: 2018-06-08
Payer: COMMERCIAL

## 2018-06-08 DIAGNOSIS — M25.511 CHRONIC PAIN OF BOTH SHOULDERS: ICD-10-CM

## 2018-06-08 DIAGNOSIS — M25.512 CHRONIC PAIN OF BOTH SHOULDERS: ICD-10-CM

## 2018-06-08 DIAGNOSIS — M54.2 CERVICALGIA: ICD-10-CM

## 2018-06-08 DIAGNOSIS — G89.29 CHRONIC PAIN OF BOTH SHOULDERS: ICD-10-CM

## 2018-06-08 PROCEDURE — 97140 MANUAL THERAPY 1/> REGIONS: CPT | Mod: GP | Performed by: PHYSICAL THERAPIST

## 2018-06-08 PROCEDURE — 97112 NEUROMUSCULAR REEDUCATION: CPT | Mod: GP | Performed by: PHYSICAL THERAPIST

## 2018-06-08 PROCEDURE — 97110 THERAPEUTIC EXERCISES: CPT | Mod: GP | Performed by: PHYSICAL THERAPIST

## 2018-07-06 ENCOUNTER — THERAPY VISIT (OUTPATIENT)
Dept: SLEEP MEDICINE | Facility: CLINIC | Age: 58
End: 2018-07-06
Payer: COMMERCIAL

## 2018-07-06 DIAGNOSIS — G47.21 CIRCADIAN RHYTHM SLEEP DISORDER, DELAYED SLEEP PHASE TYPE: ICD-10-CM

## 2018-07-06 DIAGNOSIS — E66.811 OBESITY (BMI 30.0-34.9): ICD-10-CM

## 2018-07-06 DIAGNOSIS — R06.83 SNORING: ICD-10-CM

## 2018-07-06 PROCEDURE — 95811 POLYSOM 6/>YRS CPAP 4/> PARM: CPT | Performed by: INTERNAL MEDICINE

## 2018-07-06 NOTE — MR AVS SNAPSHOT
After Visit Summary   7/6/2018    Romi Cortez    MRN: [id number]           Patient Information     Date Of Birth          1960        Visit Information        Provider Department      7/6/2018 8:00 PM [facility]        Today's Diagnoses     Circadian rhythm sleep disorder, delayed sleep phase type        Obesity (BMI 30.0-34.9)        Snoring          Care Instructions    [facility]    1. Your sleep study will be reviewed by a sleep physician within the next few days.     2. Please follow up in the sleep clinic as scheduled, or, make an appointment with your sleep provider to be seen within two weeks to discuss the results of the sleep study.    3. If you have any questions or problems with your treatment plan, please contact your sleep clinic provider at [phone] to further manage your condition.    4. Please review your attached medication list, and, at your follow-up appointment advise your sleep clinic provider about any changes.    5. Go to [email or web address] for more information about your sleep problems.    KATHERYN Baez  July 7, 2018                Follow-ups after your visit        Your next 10 appointments already scheduled     Jul 09, 2018  1:20 PM CDT   [facility] with Oneal Kohli PT   [place] [facility] [place])    [address] [place] [id number] [phone]            Jul 19, 2018  9:30 AM CDT   Return Sleep Patient with JACK Allen CNP   [facility] ([facility])    [address] [facility] [address] [phone]              Who to contact     If you have questions or need follow up information about today's clinic visit or your schedule please contact [facility] directly at [phone].  Normal or non-critical lab and imaging results will be  communicated to you by MyChart, letter or phone within 4 business days after the clinic has received the results. If you do not hear from us within 7 days, please contact the clinic through OneToucht or phone. If you have a critical or abnormal lab result, we will notify you by phone as soon as possible.  Submit refill requests through Pickie or call your pharmacy and they will forward the refill request to us. Please allow 3 business days for your refill to be completed.          Additional Information About Your Visit        [employer] Information     Pickie gives you secure access to your electronic health record. If you see a primary care provider, you can also send messages to your care team and make appointments. If you have questions, please call your primary care clinic.  If you do not have a primary care provider, please call [phone] and they will assist you.        Care EveryWhere ID     This is your Care EveryWhere ID. This could be used by other organizations to access your [facility] medical records  [id number]         Blood Pressure from Last 3 Encounters:   11/08/17 128/84   11/21/13 104/71    Weight from Last 3 Encounters:   11/08/17 97.1 kg (214 lb)   11/20/13 88.9 kg (196 lb)              We Performed the Following     Comprehensive Sleep Study        Primary Care Provider Office Phone # Fax #    Mayra Persaud [phone]       [facility] [address] [facility] [address]        Equal Access to Services     ARINA PURDY AH: Hadii leonardo ku hadasho Soomaali, waaxda luqadaha, qaybta kaalmada adeegyada, anthony ward . So [facility] [phone].    ATENCIÓN: Si habla español, tiene a arellano disposición servicios gratuitos de asistencia lingüística. Gailame al [phone].    We comply with applicable federal civil rights laws and [place] laws. We do not discriminate on the basis of race, color, national origin, age, disability, sex, sexual orientation,  or gender identity.            Thank you!     Thank you for choosing [facility], [place], SLEEP STUDY  for your care. Our goal is always to provide you with excellent care. Hearing back from our patients is one way we can continue to improve our services. Please take a few minutes to complete the written survey that you may receive in the mail after your visit with us. Thank you!             Your Updated Medication List - Protect others around you: Learn how to safely use, store and throw away your medicines at www.disposemymeds.org.          This list is accurate as of 7/6/18 11:59 PM.  Always use your most recent med list.                   Brand Name Dispense Instructions for use Diagnosis    ARTIFICIAL TEAR OP      Apply 1 drop to eye as needed Both eyes, 1-2 times a month        LAMICTAL PO      Take 200 mg by mouth daily        sertraline 100 MG tablet    ZOLOFT     Take by mouth daily        * triamcinolone 0.025 % ointment    KENALOG     Apply topically 2 times daily        * triamcinolone 0.1 % ointment    KENALOG    80 g    Apply topically 2 times daily To your affected areas on your hands, arms, toes and behind your ears.    Eczema of both hands       zolpidem 5 MG tablet    AMBIEN    1 tablet    Take tablet by mouth 15 minutes prior to sleep, for Sleep Study    Circadian rhythm sleep disorder, delayed sleep phase type, Obesity (BMI 30.0-34.9), Snoring       * Notice:  This list has 2 medication(s) that are the same as other medications prescribed for you. Read the directions carefully, and ask your doctor or other care provider to review them with you.

## 2018-07-07 NOTE — PATIENT INSTRUCTIONS
[place] [facility]    1. Your sleep study will be reviewed by a sleep physician within the next few days.     2. Please follow up in the sleep clinic as scheduled, or, make an appointment with your sleep provider to be seen within two weeks to discuss the results of the sleep study.    3. If you have any questions or problems with your treatment plan, please contact your sleep clinic provider at [phone] to further manage your condition.    4. Please review your attached medication list, and, at your follow-up appointment advise your sleep clinic provider about any changes.    5. Go to [email or web address] for more information about your sleep problems.    Kimberly Almanzar, RPSGT  July 7, 2018

## 2018-07-07 NOTE — NURSING NOTE
Completed a split night PSG per provider order.    Preliminary AHI 37.  A final therapeutic PAP pressure was achieved.    Supine REM was seen on therapeutic pressure.    Patient reports feeling refreshed in AM.

## 2018-07-09 ENCOUNTER — THERAPY VISIT (OUTPATIENT)
Dept: PHYSICAL THERAPY | Facility: CLINIC | Age: 58
End: 2018-07-09
Payer: COMMERCIAL

## 2018-07-09 DIAGNOSIS — M25.512 CHRONIC PAIN OF BOTH SHOULDERS: ICD-10-CM

## 2018-07-09 DIAGNOSIS — M25.511 CHRONIC PAIN OF BOTH SHOULDERS: ICD-10-CM

## 2018-07-09 DIAGNOSIS — M54.2 CERVICALGIA: ICD-10-CM

## 2018-07-09 DIAGNOSIS — G89.29 CHRONIC PAIN OF BOTH SHOULDERS: ICD-10-CM

## 2018-07-09 PROCEDURE — 97112 NEUROMUSCULAR REEDUCATION: CPT | Mod: GP | Performed by: PHYSICAL THERAPIST

## 2018-07-09 PROCEDURE — 97110 THERAPEUTIC EXERCISES: CPT | Mod: GP | Performed by: PHYSICAL THERAPIST

## 2018-07-09 PROCEDURE — 97140 MANUAL THERAPY 1/> REGIONS: CPT | Mod: GP | Performed by: PHYSICAL THERAPIST

## 2018-07-09 NOTE — PROGRESS NOTES
PROGRESS/DISCHARGE  REPORT    Progress reporting period is from 4/16/2018 to 7/9/2018.       SUBJECTIVE  Subjective changes noted by patient:  Patient reports improved bilateral shoulder and neck pain. Patient reports improving ROM in neck but still pain at end range. Patient states she has been consistent with some exercises but not all. Decreased pain overall with reaching behind back and rotating head, but not painfree yet.    Current Pain level: 4/10.     Initial Pain level: 7/10.   Changes in function:  Yes (See Goal flowsheet attached for changes in current functional level)  Adverse reaction to treatment or activity: None    OBJECTIVE  Changes noted in objective findings:  Yes,   Objective: bilateral shoulder AROM WNL; pain with end range right IR; cervical AROM WNL except left rotation and EXT = min loss and pain; weak scapular stabilizers     ASSESSMENT/PLAN  Updated problem list and treatment plan: Diagnosis 1:  Neck and bilateral shoulder pain  Pain -  hot/cold therapy, manual therapy, splint/taping/bracing/orthotics, self management, education, directional preference exercise and home program  Decreased ROM/flexibility - manual therapy and therapeutic exercise  Decreased joint mobility - manual therapy and therapeutic exercise  Decreased strength - therapeutic exercise and therapeutic activities  Impaired muscle performance - neuro re-education  Decreased function - therapeutic activities  Impaired posture - neuro re-education  STG/LTGs have been met or progress has been made towards goals:  Yes (See Goal flow sheet completed today.)  Assessment of Progress: The patient's condition is improving.  Self Management Plans:  Patient has been instructed in a home treatment program.  Patient  has been instructed in self management of symptoms.  I have re-evaluated this patient and find that the nature, scope, duration and intensity of the therapy is appropriate for the medical condition of the patient.  Romi  continues to require the following intervention to meet STG and LTG's:  PT    Recommendations:  This patient would benefit from continued therapy.     Frequency:  1 X a month, once daily  Duration:  for 2 months    Please refer to the daily flowsheet for treatment today, total treatment time and time spent performing 1:1 timed codes.      Patient has not schedule further PT. Patient will be discharged.

## 2018-07-09 NOTE — LETTER
[place]  [address] [place] [id number] [phone]    2018    Re: Romi Cortez   :   1960  MRN:  [id number]   REFERRING PHYSICIAN:   Sunni Wick    [facility]  Date of Initial Evaluation:  18  Visits:  Rxs Used: 7  Reason for Referral:     Cervicalgia  Chronic pain of both shoulders    PROGRESS  REPORT  Progress reporting period is from 2018 to 2018.       SUBJECTIVE  Subjective changes noted by patient:  Patient reports improved bilateral shoulder and neck pain. Patient reports improving ROM in neck but still pain at end range. Patient states she has been consistent with some exercises but not all. Decreased pain overall with reaching behind back and rotating head, but not painfree yet.    Current Pain level: 4/10.     Initial Pain level: 7/10.   Changes in function:  Yes (See Goal flowsheet attached for changes in current functional level)  Adverse reaction to treatment or activity: None    OBJECTIVE  Changes noted in objective findings:  Yes,   Objective: bilateral shoulder AROM WNL; pain with end range right IR; cervical AROM WNL except left rotation and EXT = min loss and pain; weak scapular stabilizers     ASSESSMENT/PLAN  Updated problem list and treatment plan: Diagnosis 1:  Neck and bilateral shoulder pain  Pain -  hot/cold therapy, manual therapy, splint/taping/bracing/orthotics, self management, education, directional preference exercise and home program  Decreased ROM/flexibility - manual therapy and therapeutic exercise  Decreased joint mobility - manual therapy and therapeutic exercise  Decreased strength - therapeutic exercise and therapeutic activities  Impaired muscle performance - neuro re-education  Decreased function - therapeutic activities  Impaired posture - neuro re-education  STG/LTGs have been met or progress has been made towards goals:  Yes (See Goal flow sheet  completed today.)  Assessment of Progress: The patient's condition is improving.  Self Management Plans:  Patient has been instructed in a home treatment program.  Patient  has been instructed in self management of symptoms.  I have re-evaluated this patient and find that the nature, scope, duration and intensity of the therapy is appropriate for the medical condition of the patient.  Romi continues to require the following intervention to meet STG and LTG's:  PT        Re: Romi Cortez   :   1960    Recommendations:  This patient would benefit from continued therapy.     Frequency:  1 X a month, once daily  Duration:  for 2 months    Please refer to the daily flowsheet for treatment today, total treatment time and time spent performing 1:1 timed codes.    Thank you for your referral.    INQUIRIES  Therapist: Oneal Kohli DPT, Cert. MDT   [facility] [place] [address] [place] [id number]  Phone: [phone]  Fax: [phone]

## 2018-07-10 LAB — SLPCOMP: NORMAL

## 2018-07-10 NOTE — PROCEDURES
"Patient: LYUDMILA RAPHAEL  YOB: 1960  Study Date: 7/6/2018  MRN: [id number]  Referring Provider: Self  Ordering Provider: Fiona Yen CNP    Indications for Polysomnography: The patient is a 58 y old female who is 5' 6\" and weighs 214.0 lbs. Her BMI is 34.8, Zoe sleepiness scale 3.0 and neck circumference is 37.0 cm. Relevant medical history includes chronic pain, delayed sleep phase.  A diagnostic polysomnogram was performed to evaluate for sleep apnea.   After 148.5 minutes of sleep time the patient exhibited sufficient respiratory events qualifying her for a CPAP trial which was then initiated.    Polysomnogram Data: A full night polysomnogram recorded the standard physiologic parameters including EEG, EOG, EMG, ECG, nasal and oral airflow. Respiratory parameters of chest and abdominal movements were recorded with respiratory inductance plethysmography. Oxygen saturation was recorded by pulse oximetry.  Hypopnea scoring rule used: 1B 4%    Diagnostic PSG  Sleep Architecture: Sleep disruption partially-attributable to respiratory events.   The total recording time of the polysomnogram was 169.0 minutes. The total sleep time was 148.5 minutes. Sleep latency was decreased at 4.5 minutes with the use of zolpdiem. REM latency was 58.0 minutes. Arousal index was increased at 80.8 arousals per hour. Sleep efficiency was normal at 87.9%. Wake after sleep onset was 9.0 minutes. The patient spent 6.1% of total sleep time in Stage N1, 28.6% in Stage N2, 51.2% in Stage N3, and 14.1% in REM. Time in REM supine was 21.0 minutes.    Respiration: Severe obstructive sleep apnea without hypoxemia.    Events ? The polysomnogram revealed a presence of 62 obstructive, 4 central, and - mixed apneas resulting in an apnea index of 26.7 events per hour. There were 34 obstructive hypopneas and - central hypopneas resulting in an obstructive hypopnea index of 13.7 and central hypopnea index of - events per hour. The " combined apnea/hypopnea index was 40.4 events per hour (central apnea/hypopnea index was 1.6 events per hour).  The REM AHI was 82.9 events per hour. The supine AHI was 40.4 events per hour. The RERA index was 39.2 events per hour. The RDI was 79.6 events per hour.    Snoring - was reported as light to moderate.    Respiratory rate and pattern - was notable for normal respiratory rate and pattern.    Sustained Sleep Associated Hypoventilation - Transcutaneous carbon dioxide monitoring was not used, however significant hypoventilation was not suggested by oximetry, or was/was not present with a maximum change from 0 to 0 mmHg and 0 minutes at or greater than 55 mmHg.    Sleep Associated Hypoxemia - (Greater than 5 minutes O2 sat at or below 88%) was not present. Baseline oxygen saturation was 93.8%. Lowest oxygen saturation was 81.0%. Time spent less than or equal to 88% was 2.0 minutes. Time spent less than or equal to 89% was 3.0 minutes.     Treatment PSG  Sleep Architecture: Incomplete resolution of sleep disruption with worsening in the supine position.   At 01:28:06 AM the patient was placed on PAP treatment and was titrated at pressures ranging from CPAP 5 cmH2O up to CPAP 5 cmH2O. The total recording time of the treatment portion of the study was 275.6 minutes. The total sleep time was 236.5 minutes. During the treatment portion of the study the sleep latency was 11.0 minutes. REM latency was 49.0 minutes. Arousal index was increased at 31.0 arousals per hour. Sleep efficiency was normal at 85.8%. Wake after sleep onset was 24.5 minutes. The patient spent 3.4% of total sleep time in Stage N1, 23.9% in Stage N2, 28.3% in Stage N3, and 44.4% in REM. Time in REM supine was 56.0 minutes.     Respiration: Resolution of obstructive events with development of central events persistent sleep disruption in the supine position.    The final pressure was CPAP 5 cmH2O with an AHI of 12.7 events per hour. Time in REM  supine on final pressure was 56.0 minutes.     This titration was considered  adequate (residual AHI with 75% decrease or above constraints without REM?supine sleep at final pressure).    Movement Activity: Periodic limb movement with relatively infrequent arousals    Periodic Limb Movements  o During the diagnostic portion of the study, there were 0 PLMs recorded.   o During the treatment portion of the study, there were 103 PLMs recorded. The PLM index was 26.1 movements per hour. The PLM Arousal Index was 5.3 per hour.    REM EMG Activity - Excessive transient/sustained muscle activity was not present.    Nocturnal Behavior - Abnormal sleep related behaviors were not noted.    Bruxism - None apparent.    Cardiac Summary: Normal sinus rhythm.  During the diagnostic portion of the study, the average pulse rate was 60.5 bpm. The minimum pulse rate was 50.8 bpm while the maximum pulse rate was 81.0 bpm.    During the treatment portion of the study, the average pulse rate was 59.3 bpm. The minimum pulse rate was 51.9 bpm while the maximum pulse rate was 92.1 bpm.     Arrhythmias were not noted.      Assessment:     Severe obstructive sleep apnea with sleep disruption resolved with CPAP with development of central events and sleep disruption in supine position.   Recommendations:    Consider teatment of ALE with narrow range auto?titrating PAP therapy with a range of 5 cmH2O to 8 cmH2O with the addition of lateral positioning device if central events persist. Recommend clinical follow up with sleep management team, including review of compliance measures.    Patient may be a candidate for dental appliance through referral to Sleep Dentistry for the treatment of obstructive sleep apnea and/or socially disruptive snoring.    Suggest optimizing sleep schedule and avoiding sleep deprivation.    Weight management.    Pharmacologic therapy should be used for management of restless legs syndrome only if present and clinically  indicated and not based on the presence of periodic limb movements alone.    Diagnostic Codes:   Obstructive Sleep Apnea G47.33  Repetitive Intrusions Into Sleep F51.8  Primary Central Sleep Apnea G47.31      7/6/2018 [facility] Sleep Study (214.0 lbs) - AHI 40.4, RDI 79.6, Supine AHI 40.4, REM AHI 82.9, Low O2% 81.0%, Time Spent ?88% 2.0, Time Spent ?89% 3.0. Treatment was titrated to a pressure of CPAP 5 with an AHI 12.7. Time spent in REM supine at this pressure was 56.0 minutes.      _____________________________________   Electronically Signed By: Romain Mosley MD 13:00 7/10/18

## 2018-08-13 ENCOUNTER — OFFICE VISIT (OUTPATIENT)
Dept: SLEEP MEDICINE | Facility: CLINIC | Age: 58
End: 2018-08-13
Payer: COMMERCIAL

## 2018-08-13 VITALS
BODY MASS INDEX: 31.66 KG/M2 | HEART RATE: 65 BPM | OXYGEN SATURATION: 97 % | DIASTOLIC BLOOD PRESSURE: 85 MMHG | HEIGHT: 66 IN | SYSTOLIC BLOOD PRESSURE: 117 MMHG | RESPIRATION RATE: 16 BRPM | WEIGHT: 197 LBS

## 2018-08-13 DIAGNOSIS — E66.811 OBESITY (BMI 30.0-34.9): ICD-10-CM

## 2018-08-13 DIAGNOSIS — G47.21 CIRCADIAN RHYTHM SLEEP DISORDER, DELAYED SLEEP PHASE TYPE: ICD-10-CM

## 2018-08-13 DIAGNOSIS — G47.33 OSA (OBSTRUCTIVE SLEEP APNEA): Primary | ICD-10-CM

## 2018-08-13 PROCEDURE — 99214 OFFICE O/P EST MOD 30 MIN: CPT | Performed by: NURSE PRACTITIONER

## 2018-08-13 NOTE — PROGRESS NOTES
DATE OF VISIT:  11/08/2017      CHIEF COMPLAINT:  Ms. Romi Cortez has self-referred for difficulties with frequent snoring and waking.      HISTORY OF PRESENT ILLNESS:  Reports a 3-year history of trying weight loss to improve her quality of sleep.  She feels if there is no treatment that she might be at increased risk for dementia.  Family members report snoring, uncertain of the actual volume.  She does awaken with a snort or gasping arousal, has pauses in her breathing and trouble breathing through her nose.  Sleeps primarily on her left side and occasionally on her back.  All symptoms are worse on her back.  No signs of orexin deficiency, 2/7 nights has problems with feeling some restlessness in her legs.  This will come and go for a night or 2 and then be gone for a period of time.  Stretching often does help, but the 2 nights it is there it is harder to sleep.  No abnormal nighttime behaviors.      Admits to being a night owl.  Earliest bedtime is probably 11:30 to enter bed, otherwise between 12:00 and 1:00 p.m. is her routine.  Exits bed as early as 7:00 a.m. on work days, 2/5.  Most common time to wake up on workdays is between 8:30 and 9:00 and 9:00 a.m. on weekends.  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  Activities in bed do include some reading.  Wakes up several times per night, but usually can fall right back asleep.  Unsure why she does wake up.  She is not sure if it is her snoring.  Total sleep time on work nights 6 hours, 8 hours on weekends.  With wakeups will participate in some worry.  If she does take a nap, it is hard for her mind to turn off because her mind is too busy.  Activities in bed do include reading from bed, phone or computer and often she is reading from a book.      SOCIAL AND PERSONAL HISTORY:  She is , lives with her , is self-employed as a musician and artist.      SLEEP ENVIRONMENT:  Bed partner  can be disturbing to her sleep.      FAMILY HISTORY:  Brother and dad have been diagnosed.  Some have restless legs.      SUBSTANCES THAT AFFECT SLEEP:  Alcohol intake is rare.  No other recreational drugs.  Caffeine is rare and minimal.  Does exercise.  Her [place] Sleepiness Score is 3.  Denies difficulties with driving.  Does state that at times being tired or fatigued or sleepy can affect her performance.  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  Depression and anxiety is pretty good in the past 30 days.      REVIEW OF SYSTEMS:  A 14-point comprehensive review of systems completed and negative with the exception of the following:   EYES:  Some changes in vision.   EARS, NOSE AND THROAT:  Sore throat.   NERVOUS SYSTEM:  Headaches at times and numbness in arms.  She does play the violin.     SKIN:  Rashes she is not too sure.   LUNGS:  She is in good condition, can do 2 flights of stairs.   MENTAL HEALTH:  She is bipolar.      SURGICAL HISTORY:  D&C, dental, appendectomy.      PAST MEDICAL HISTORY:     1.  Vertigo.     2.  Back problems which has resolved.     3.  Neck and right arm pain.     4.  Hand numbness.     5.  History of menstrual difficulties earlier in her life.     6.  Bipolar II with depression and anxiety.    7.  Staph infection.   8.  ADD.   9.  Two pregnancies.   10.  Measles, chickenpox, etc.        MEDICATIONS:  She is on sertraline and lamotrigine.     Allergies:    No Known Allergies    Medications:    Current Outpatient Prescriptions   Medication Sig Dispense Refill     triamcinolone (KENALOG) 0.025 % ointment Apply topically 2 times daily       ARTIFICIAL TEAR OP Apply 1 drop to eye as needed Both eyes, 1-2 times a month       sertraline (ZOLOFT) 100 MG tablet Take by mouth daily       LamoTRIgine (LAMICTAL PO) Take 200 mg by mouth daily         Problem  "List:  Patient Active Problem List    Diagnosis Date Noted     Dermatitis, seborrheic 09/15/2015     Priority: Medium     AK (actinic keratosis) 09/15/2015     Priority: Medium     Senile sebaceous gland hyperplasia 09/15/2015     Priority: Medium     Lentigines 09/15/2015     Priority: Medium     Appendicitis 11/21/2013     Priority: Medium        Past Medical/Surgical History:  No past medical history on file.  Past Surgical History:   Procedure Laterality Date     D & C  1996     LAPAROSCOPIC APPENDECTOMY  11/21/2013    Procedure: LAPAROSCOPIC APPENDECTOMY;  Laparoscopic Appendectomy;  Surgeon: Sung Rodriguez MD;  Location: [facility] OR       Physical Examination:  Vitals: /84  Pulse 67  Resp 18  Ht 1.676 m (5' 6\")  Wt 97.1 kg (214 lb)  SpO2 96%  BMI 34.54 kg/m2  BMI= Body mass index is 34.54 kg/(m^2).    Neck Cir (cm): 37 cm    Hay Total Score 11/8/2017   Total score - Hay 3       GENERAL APPEARANCE: healthy, alert and no distress  EYES: Eyes grossly normal to inspection and frequent blinking  HENT: oropharynx crowded, soft palate dependent, tongue base enlarged and nares patent  NECK: thyroid normal to palpation  RESP: lungs clear to auscultation - no rales, rhonchi or wheezes  CV: regular rates and rhythm, normal S1 S2, no S3 or S4 and no murmur, click or rub  Extremities are without clubbing, edema  Musculoskeletal:Moves without difficulty  Mallampati Class: IV.  Tonsillar Stage: 1  hidden by pillars.  Dental: Dentition in good condition.  Good advancement of lower jaw without tmd  Skin: with facial blemishes      ASSESSMENT AND PLAN:     1.  It is my impression that Ms. Cortez has a mild pretest probability for obstructive sleep apnea.  Currently with the data we have today her pretest probability is 3/8 for the STOP-Bang questionnaire.  She was unaware of the volume of her snoring and is getting more information.  Once she has more information, she was given the price line number to " call for the cost of a polysomnogram or an HST and then call insurance regarding reimbursement.  She will MyChart me regarding her choice of moving forward.   2.  Delayed sleep phase disorder likely impacting her ability to fall asleep at a decent time and then on occasion having difficulty meeting her early morning requirements resulting in insufficient sleep.  We talked about the impact of insufficient sleep on the development of cognitive difficulties down the road and she is aware of the importance of protecting that morning sleep time.   3.  Obesity.  We discussed the link between being overweight and the development of obstructive sleep apnea and she is aware of her need to lose weight at this time.      Thank you for allowing me to participate in this kind woman's care.      Time spent with patient 60 minutes of which greater than 50% was spent in counseling, education and coordination of care.         JACK MOONEY, CNP             D: 2017 18:12   T: 2017 23:51   MT:       Name:     LYUDMILA RAPHAEL   MRN:      [id number]        Account:      [id number]   :      1960           Visit Date:   2017      Document: [id number]      CC: Returns to clinic to discuss PSG/HST    HPI: Referred by ...   Baseline symptoms Reports a 3-year history of trying weight loss to improve her quality of sleep.  She feels if there is no treatment that she might be at increased risk for dementia.  Family members report snoring, uncertain of the actual volume.  She does awaken with a snort or gasping arousal, has pauses in her breathing and trouble breathing through her nose.  Sleeps primarily on her left side and occasionally on her back.  All symptoms are worse on her back.  No signs of orexin deficiency, 2/7 nights has problems with feeling some restlessness in her legs.  This will come and go for a night or 2 and then be gone for a period of time.  Stretching often does help, but the 2 nights  it is there it is harder to sleep.  No abnormal nighttime behaviors.       possible RLS  Cormorbid conditions...    PSG/HST of ... Revealed    Treatment options discussed ...    Barriers to treatment...    Preferences ....    VS    ASSESSMENT/PLAN:    1. ALE: ...    2. Obesity: ...    3. Htn:...    4. Smoking ...

## 2018-08-13 NOTE — PROGRESS NOTES
CC: pt returns to clinic to discuss PSG and develop a plan of care    HPI: Reports a 3-year history of trying weight loss to improve her quality of sleep.  She feels if there is no treatment that she might be at increased risk for dementia.  Family members report snoring, uncertain of the actual volume.  She does awaken with a snort or gasping arousal, has pauses in her breathing and trouble breathing through her nose.  Sleeps primarily on her left side and occasionally on her back.  All symptoms are worse on her back.  No signs of orexin deficiency, infrequent RLS    Admits to being a night owl.  Earliest bedtime is probably 11:30 to enter bed, otherwise between 12:00 and 1:00 p.m. is her routine.  Exits bed as early as 7:00 a.m. on work days, .  Most common time to wake up on workdays is between 8:30 and 9:00 and 9:00 a.m. on weekends.  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    Sleep comorbities....DSPD,    Comorbidity: vertigo, neck and R arm pain with hand numbness, bipolar II with depression and anxiety, ADD    PS18 AHI 40.4, REM AHI 82.9, supine AHI 40.4 RERA index was 39.2, RDI 79.6, snoring light to moderate, time with 02 sat < =88% was 2.0 minutes.  Resolution of obstructive events with Cpap at 5cm h20 with development of central events and persistent sleep disruption in the supine position.  During tx: plms 26.1, PLM AI 5.3.    Treatment options discussed: pt wishes to consider a dental appliance and modified positional therapy (not on R shoulder).    Allergies:    No Known Allergies    Medications:    Current Outpatient Prescriptions   Medication Sig Dispense Refill     ARTIFICIAL TEAR OP Apply 1 drop to eye as needed Both eyes, 1-2 times a month       LamoTRIgine (LAMICTAL PO) Take 200 mg by mouth daily       sertraline (ZOLOFT) 100 MG tablet Take by mouth daily       triamcinolone (KENALOG) 0.025 % ointment Apply topically 2 times  "daily         Problem List:  Patient Active Problem List    Diagnosis Date Noted     Cervicalgia 04/17/2018     Priority: Medium     Chronic pain of both shoulders 04/17/2018     Priority: Medium     Dermatitis, seborrheic 09/15/2015     Priority: Medium     AK (actinic keratosis) 09/15/2015     Priority: Medium     Senile sebaceous gland hyperplasia 09/15/2015     Priority: Medium     Lentigines 09/15/2015     Priority: Medium     Appendicitis 11/21/2013     Priority: Medium        Past Medical/Surgical History:  No past medical history on file.  Past Surgical History:   Procedure Laterality Date     D & C  1996     LAPAROSCOPIC APPENDECTOMY  11/21/2013    Procedure: LAPAROSCOPIC APPENDECTOMY;  Laparoscopic Appendectomy;  Surgeon: Sung Rodriguez MD;  Location: [facility] OR           Physical Examination:  Vitals: /85  Pulse 65  Resp 16  Ht 1.676 m (5' 6\")  Wt 89.4 kg (197 lb)  SpO2 97%  BMI 31.8 kg/m2  BMI= Body mass index is 31.8 kg/(m^2).    Berry Total Score 8/13/2018   Total score - Berry 4     GENERAL APPEARANCE: healthy, alert and no distress     Assessment/plan:   1. Wishing to consider a dental appliance for randy, along with positional therapy.  Goal is to reduce apneic burden (could not see use of cpap) Pt is a fiddle player, undergoing pt.  Positional therapy to be used to stay off of R side.  Follow up with me in 3-4 months, will determine if ready for an HST.  2. Obesity: wt loss may impact severity   3. Circadian rhythm: DSPD: recommend rise time similar throughout the week    Time spent with patient is 25 minutes, of which >50% was spent in counseling, education and coordination of care.               "

## 2018-08-13 NOTE — MR AVS SNAPSHOT
After Visit Summary   8/13/2018    Romi Cortez    MRN: [id number]           Patient Information     Date Of Birth          1960        Visit Information        Provider Department      8/13/2018 11:00 AM Fiona Yen APRN CNP [facility] [place], Sleep Study        Today's Diagnoses     ALE (obstructive sleep apnea)    -  1      Care Instructions      Follow up with me in 3 months  Dentist referral  Positioning Device  Positioning devices are generally used when sleep apnea is mild and only occurs on your back.This example shows a pillow that straps around the waist. It may be appropriate for those whose sleep study shows milder sleep apnea that occurs primarily when lying flat on one's back. Preliminary studies have shown benefit but effectiveness at home may need to be verified by a home sleep test. These devices are generally not covered by medical insurance.                  Amazon or ebay for slumber bump pillow, or backpack w/ chest strap stuffed w/ pillow;  or t shirt w/2 pockets, sew in tennis balls          Follow-ups after your visit        Additional Services     SLEEP DENTAL REFERRAL       Dental appliance resources recommended by [facility]     Below is a list of dental appliance resources recommended by [facility]   If you wish to choose your own dental sleep dentist, you may identify a provider close to you: [email or web address]    [facility]   [facility]   Jimenez Horne DDS, [address] [address]   Appointments: [phone]  Fax: [phone]   Email: [email or web address]   Dental and Oral Surgery Clinic   Danny Benton, ALICIA Yancey DDS   [address] [place] [address], [address]   [place] [address]   Appointments: [phone]   Website:  [email or web address]    Snoring and Sleep Apnea   D[facility] Treatment Center   BLANK Starr DDS  [address], [address]   [place] [address]   Appointments: [phone]   Fax: [phone]   Email: [email or web address]  Website: [employer]     [facility]   Office 1   Colton Rossi DDS - [DME Medicare]  [address], [address]   [place] [address]  Appointments: [phone]  Fax: [phone]  Website: [employer]    [facility]   Office 2  Colton Rossi DDS - [DME Medicare]  [address]  [place] [address]  Appointments: [phone]  Fax: [phone]  Website: [employer]    [facility]  Julian Alexis DDS  [address] [place] [address]  Appointments: [phone]    [facility]   Carloz Yancey DDS   [facility]   [address], [address]   [place] [address]   Appointments: [phone]   Fax: [phone]   Website: [employer]     [facility]   [place] Office   Vladimir Bustamante DDS, MS - [DME Medicare]  [facility]   [address] [place] [address], [address]   [place] [address]   Appointments: [phone]   Fax: [phone]   Website: [employer]     Nikki Your Tori Velasquez, ALICIA, MSD - [DME Medicare]  [address], [address]  [place] [address]  Appointments [phone]  Fax: [phone]  Website: [employer]    [facility]   [place] Office   Tabby Amador DDS, PhD, [facility]   [address], [address]   [place] [address]   Appointments: [phone]   Fax: [phone]   Website: [email or web address]     [facility]   [place] Office   Tabby Amador DDS, PhD, MS   [facility]   [facility], [address]    [place] [address]   Appointments: [phone]   Fax: [phone]   Website: [email or web address]     [facility] [place] [facility]  Iliana Wu DDS  [facility]  [address] [place] [address]  Appointments: [phone]   Fax: [phone]    If you wish to choose your own dental sleep dentist, you may identify a provider close to you: [email or web address]      AHI: 40.4 PSG DATE: 7/6/18     Return to clinic in 3 for Home Sleep Test to confirm adequacy of Treatment.    Other information regarding this referral: Mandibular repositioning appliance for ALE. If your insurance asks for a CPT code, it is .    Please be aware that coverage of these services is subject to the terms and limitations of your health insurance plan.  Call member services at your health plan with any benefit or coverage questions.      Please bring the following to your appointment:    >>   List of current medications   >>   This referral request   >>   Any documents/labs given to you for this referral                  Follow-up notes from your care team     Return in about 3 months (around 11/13/2018) for dental referral, .      Who to contact     If you have questions or need follow up information about today's clinic visit or your schedule please contact [facility] directly at [phone].  Normal or non-critical lab and imaging results will be communicated to you by MyChart, letter or phone within 4 business days after the clinic has received the results. If you do not hear from us within 7 days, please contact the clinic through MyChart or phone. If you have a critical or abnormal lab result, we will notify you by phone as soon as possible.  Submit refill requests through [employer] or call your pharmacy and they will forward the refill request to us. Please allow 3 business days for your refill to be completed.          Additional Information About Your Visit       "  MyChart Information     [employer] gives you secure access to your electronic health record. If you see a primary care provider, you can also send messages to your care team and make appointments. If you have questions, please call your primary care clinic.  If you do not have a primary care provider, please call [phone] and they will assist you.        Care EveryWhere ID     This is your Care EveryWhere ID. This could be used by other organizations to access your [place] medical records  [id number]        Your Vitals Were     Pulse Respirations Height Pulse Oximetry BMI (Body Mass Index)       65 16 1.676 m (5' 6\") 97% 31.8 kg/m2        Blood Pressure from Last 3 Encounters:   08/13/18 117/85   11/08/17 128/84   11/21/13 104/71    Weight from Last 3 Encounters:   08/13/18 89.4 kg (197 lb)   11/08/17 97.1 kg (214 lb)   11/20/13 88.9 kg (196 lb)              We Performed the Following     SLEEP DENTAL REFERRAL        Primary Care Provider Office Phone # Fax #    Mayra Persaud [phone]       [facility] CLINIC [address] [address]        Equal Access to Services     ARINA PURDY AH: Hadii leonardo ku hadasho Soomaali, waaxda luqadaha, qaybta kaalmada adeegyada, anthony rhodesin hayoksanan che rayn. So [facility] [phone].    ATENCIÓN: Si habla español, tiene a arellano disposición servicios gratuitos de asistencia lingüística. Llame al [phone].    We comply with applicable federal civil rights laws and [place] laws. We do not discriminate on the basis of race, color, national origin, age, disability, sex, sexual orientation, or gender identity.            Thank you!     Thank you for choosing [facility] SLEEP STUDY  for your care. Our goal is always to provide you with excellent care. Hearing back from our patients is one way we can continue to improve our services. Please take a few minutes to complete the written survey that you may receive in the mail after your " visit with us. Thank you!             Your Updated Medication List - Protect others around you: Learn how to safely use, store and throw away your medicines at www.disposemymeds.org.          This list is accurate as of 8/13/18 11:48 AM.  Always use your most recent med list.                   Brand Name Dispense Instructions for use Diagnosis    ARTIFICIAL TEAR OP      Apply 1 drop to eye as needed Both eyes, 1-2 times a month        LAMICTAL PO      Take 200 mg by mouth daily        sertraline 100 MG tablet    ZOLOFT     Take by mouth daily        triamcinolone 0.025 % ointment    KENALOG     Apply topically 2 times daily

## 2018-08-13 NOTE — PATIENT INSTRUCTIONS
Follow up with me in 3 months  Dentist referral  Positioning Device  Positioning devices are generally used when sleep apnea is mild and only occurs on your back.This example shows a pillow that straps around the waist. It may be appropriate for those whose sleep study shows milder sleep apnea that occurs primarily when lying flat on one's back. Preliminary studies have shown benefit but effectiveness at home may need to be verified by a home sleep test. These devices are generally not covered by medical insurance.            Symptom.ly or maufait for slumber bump pillow, or backpack w/ chest strap stuffed w/ pillow;  or t shirt w/2 pockets, sew in tennis balls

## 2018-08-15 ENCOUNTER — DOCUMENTATION ONLY (OUTPATIENT)
Dept: SLEEP MEDICINE | Facility: CLINIC | Age: 58
End: 2018-08-15

## 2018-08-15 NOTE — PROGRESS NOTES
Dental referral sent on Wednesday, August 15 to [place] [facility] ([place] [address]) to have Dental  to review and start process of referral then contact pt with scheduling.  Yady Casarez,

## 2018-08-24 ASSESSMENT — ENCOUNTER SYMPTOMS
NECK PAIN: 1
MUSCLE WEAKNESS: 1
MEMORY LOSS: 0
ARTHRALGIAS: 1
PARALYSIS: 0
JOINT SWELLING: 0
TREMORS: 0
MYALGIAS: 1
BACK PAIN: 0
SEIZURES: 0
TINGLING: 1
DIZZINESS: 0
SPEECH CHANGE: 0
WEAKNESS: 0
STIFFNESS: 0
LOSS OF CONSCIOUSNESS: 0
MUSCLE CRAMPS: 0
HEADACHES: 0
NUMBNESS: 1
DISTURBANCES IN COORDINATION: 0

## 2018-08-28 ENCOUNTER — PATIENT OUTREACH (OUTPATIENT)
Dept: CARE COORDINATION | Facility: CLINIC | Age: 58
End: 2018-08-28

## 2018-08-29 ENCOUNTER — RADIANT APPOINTMENT (OUTPATIENT)
Dept: GENERAL RADIOLOGY | Facility: CLINIC | Age: 58
End: 2018-08-29
Attending: PHYSICIAN ASSISTANT
Payer: COMMERCIAL

## 2018-08-29 ENCOUNTER — OFFICE VISIT (OUTPATIENT)
Dept: NEUROSURGERY | Facility: CLINIC | Age: 58
End: 2018-08-29
Payer: COMMERCIAL

## 2018-08-29 VITALS
DIASTOLIC BLOOD PRESSURE: 88 MMHG | HEART RATE: 63 BPM | BODY MASS INDEX: 31.52 KG/M2 | OXYGEN SATURATION: 98 % | HEIGHT: 67 IN | WEIGHT: 200.8 LBS | SYSTOLIC BLOOD PRESSURE: 133 MMHG

## 2018-08-29 DIAGNOSIS — M54.12 CERVICAL RADICULOPATHY: Primary | ICD-10-CM

## 2018-08-29 PROBLEM — G89.29 CHRONIC PAIN OF BOTH SHOULDERS: Status: RESOLVED | Noted: 2018-04-17 | Resolved: 2018-08-29

## 2018-08-29 PROBLEM — M25.511 CHRONIC PAIN OF BOTH SHOULDERS: Status: RESOLVED | Noted: 2018-04-17 | Resolved: 2018-08-29

## 2018-08-29 PROBLEM — M25.512 CHRONIC PAIN OF BOTH SHOULDERS: Status: RESOLVED | Noted: 2018-04-17 | Resolved: 2018-08-29

## 2018-08-29 PROBLEM — M54.2 CERVICALGIA: Status: RESOLVED | Noted: 2018-04-17 | Resolved: 2018-08-29

## 2018-08-29 RX ORDER — METHYLPREDNISOLONE 4 MG
TABLET, DOSE PACK ORAL
Qty: 21 TABLET | Refills: 0 | Status: SHIPPED | OUTPATIENT
Start: 2018-08-29 | End: 2019-02-25

## 2018-08-29 ASSESSMENT — PAIN SCALES - GENERAL: PAINLEVEL: SEVERE PAIN (6)

## 2018-08-29 NOTE — NURSING NOTE
Chief Complaint   Patient presents with     Consult     P NEW - CERVICALGIA/EPIC/PACS     Preventive Care:    Colorectal Cancer Screening: During our visit today, we discussed that it is recommended you receive colorectal cancer screening. Please call or make an appointment with your primary care provider to discuss this. You may also call the [facility] scheduling line ([phone]) to set up a colonoscopy appointment.    Yash Monreal, EMT

## 2018-08-29 NOTE — MR AVS SNAPSHOT
After Visit Summary   8/29/2018    Romi Cortez    MRN: [id number]           Patient Information     Date Of Birth          1960        Visit Information        Provider Department      8/29/2018 11:00 AM Danica Colunga PA-C M [facility] Neurosurgery        Today's Diagnoses     Cervical radiculopathy    -  1       Follow-ups after your visit        Additional Services     PT Evaluation and Treatment (Internal Referral) [6869]       Your provider has referred you to: PHYSICAL THERAPY at  [facility]   appts     Please be aware that coverage of these services is subject to the terms and limitations of your health insurance plan.  Call member services at your health plan with any benefit or coverage questions.      Treatment: Evaluation & Treatment eval and treat cerv radiculopathy  Special Instructions: None  Special Programs: None  Modalities: As indicated  Equipment: Home traction unit   10-15 pounds  For 10-15 minutes BID please supply the unit.  thanks  Duration and Frequency: Per Therapist Evaluation    Please bring the following to your appointment:  >>   Any x-rays, CTs or MRIs which have been performed.  Contact the facility where they were done to arrange for  prior to your scheduled appointment.  Any new CT, MRI or other procedures ordered by your specialist must be performed at a [place] facility or coordinated by your clinic's referral office.    >>   List of current medications   >>   This referral request   >>   Any documents/labs given to you for this referral      **Note to Provider** To refer patients to therapy outside of the Location list, change the order class to External Referral in the General section.                  Your next 10 appointments already scheduled     Sep 26, 2018  1:30 PM CDT   (Arrive by 1:15 PM)   Return Visit with GIFTY Pat [facility] ([facility])    [address] [place] [address]  "[facility] [address] [phone]              Who to contact     Please call your clinic at [phone] to:    Ask questions about your health    Make or cancel appointments    Discuss your medicines    Learn about your test results    Speak to your doctor            Additional Information About Your Visit        MyChart Information     [employer]t gives you secure access to your electronic health record. If you see a primary care provider, you can also send messages to your care team and make appointments. If you have questions, please call your primary care clinic.  If you do not have a primary care provider, please call [phone] and they will assist you.      Amazon is an electronic gateway that provides easy, online access to your medical records. With Amazon, you can request a clinic appointment, read your test results, renew a prescription or communicate with your care team.     To access your existing account, please contact your [facility] or call [phone] for assistance.        Care EveryWhere ID     This is your Care EveryWhere ID. This could be used by other organizations to access your [place] medical records  [id number]        Your Vitals Were     Pulse Height Pulse Oximetry Breastfeeding? BMI (Body Mass Index)       63 1.689 m (5' 6.5\") 98% No 31.92 kg/m2        Blood Pressure from Last 3 Encounters:   08/29/18 133/88   08/13/18 117/85   11/08/17 128/84    Weight from Last 3 Encounters:   08/29/18 91.1 kg (200 lb 12.8 oz)   08/13/18 89.4 kg (197 lb)   11/08/17 97.1 kg (214 lb)              We Performed the Following     PT Evaluation and Treatment (Internal Referral) [9032]     X-ray Cervical spine 4 views (AP, lateral, flexion, extension)  [[id number]]          Today's Medication Changes          These changes are accurate as of 8/29/18  1:10 PM.  If you have any questions, ask your nurse or doctor.               Start taking these medicines.        " Dose/Directions    methylPREDNISolone 4 MG tablet   Commonly known as:  MEDROL DOSEPAK   Used for:  Cervical radiculopathy   Started by:  Danica Colunga PA-C        Follow package instructions   Quantity:  21 tablet   Refills:  0            Where to get your medicines      Some of these will need a paper prescription and others can be bought over the counter.  Ask your nurse if you have questions.     Bring a paper prescription for each of these medications     methylPREDNISolone 4 MG tablet                Primary Care Provider Office Phone # Fax #    Mayra Persaud [phone]       [facility] CLINIC [address] [address]        Equal Access to Services     [facility]: Hadii aad ku hadasho Soomaali, waaxda luqadaha, qaybta kaalmada adeegyada, anthony garcia hayoksanan adedavid ward . So [facility] [phone].    ATENCIÓN: Si habla español, tiene a arellano disposición servicios gratuitos de asistencia lingüística. [facility] [phone].    We comply with applicable federal civil rights laws and [place] laws. We do not discriminate on the basis of race, color, national origin, age, disability, sex, sexual orientation, or gender identity.            Thank you!     Thank you for choosing [facility] NEUROSURGERY  for your care. Our goal is always to provide you with excellent care. Hearing back from our patients is one way we can continue to improve our services. Please take a few minutes to complete the written survey that you may receive in the mail after your visit with us. Thank you!             Your Updated Medication List - Protect others around you: Learn how to safely use, store and throw away your medicines at www.disposemymeds.org.          This list is accurate as of 8/29/18  1:10 PM.  Always use your most recent med list.                   Brand Name Dispense Instructions for use Diagnosis    ARTIFICIAL TEAR OP      Apply 1 drop to eye as needed Both eyes, 1-2 times a month         LAMICTAL PO      Take 200 mg by mouth daily        methylPREDNISolone 4 MG tablet    MEDROL DOSEPAK    21 tablet    Follow package instructions    Cervical radiculopathy       sertraline 100 MG tablet    ZOLOFT     Take by mouth daily        triamcinolone 0.025 % ointment    KENALOG     Apply topically 2 times daily

## 2018-08-29 NOTE — PROGRESS NOTES
Neurosurgery Clinic Consult  Date of Visit: 8/29/2018    Referring Provider:  Sunni Wick MD    Dear Dr. Wick    We were happy to see Romi Cortez, a pleasant 58 year old year old female for right arm pain present for about 8 months.    HPI: This pleasant lady has had neck pain for about 30 years.  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  In the last several months she started noticing numbness in the fingers, all of the fingers in the left hand when she plays, and the lateral 3 fingers in the right hand when she plays, and her neck pain has started to stray into the bilateral trapezii, worse on the right than the left, and now down the right arm time, to the right forearm and all the way to the right wrist.  The right forearm and elbow area is acutely bad, and is the worst of the pain.  The right hand numbness wakes her in the morning.  It does not seem to get any better if she shakes it out.  She does not feel her arms or hands are weak.  There is no change in the arm numbness or pain with movement of her head but there is when she moves her arms.  Specifically her right arm.  No change in bowel or bladder function she has had a good bit of treatment over the years, chiropractic, massage, physical therapy.  None of it is made much difference for any significant time.  She has never had any shots but she has had an MRI done.  This revealed a fair amount of cervical spondylosis and she presents for evaluation and treatment.    [facility] -  Patient Entered Questionnaire/Answers 8/24/2018   What number best describes your pain right now:  0 = No pain  to  10 = Worst pain imaginable 5   How would you describe the pain? burning, numbness, dull, aching, other   Which of the following worsen your pain? coughing /  sneezing   Which of the following improve or reduce your pain?  nothing relieves the pain   What number best describes your average pain for the past week:  0 = No pain  to  10 = Worst pain imaginable 6   What number best describes your LOWEST pain in past 24 hours:  0 = No pain  to  10 = Worst pain imaginable 4   What number best describes your WORST pain in past 24 hours:  0 = No pain  to  10 = Worst pain imaginable 7   When is your pain worst? Constant   What non-medicine treatments have you already had for your pain? physical therapy, relaxation training, chiropractic care   Have you tried treating your pain with medication?  Yes   Are you currently taking medications for your pain? Yes       Current Outpatient Prescriptions:      ARTIFICIAL TEAR OP, Apply 1 drop to eye as needed Both eyes, 1-2 times a month, Disp: , Rfl:      LamoTRIgine (LAMICTAL PO), Take 200 mg by mouth daily, Disp: , Rfl:      methylPREDNISolone (MEDROL DOSEPAK) 4 MG tablet, Follow package instructions, Disp: 21 tablet, Rfl: 0     sertraline (ZOLOFT) 100 MG tablet, Take by mouth daily, Disp: , Rfl:      triamcinolone (KENALOG) 0.025 % ointment, Apply topically 2 times daily, Disp: , Rfl:     No Known Allergies    No past medical history on file.    Past Surgical History:   Procedure Laterality Date     D & C  1996     LAPAROSCOPIC APPENDECTOMY  11/21/2013    Procedure: LAPAROSCOPIC APPENDECTOMY;  Laparoscopic Appendectomy;  Surgeon: Sung Rodriguez MD;  Location:  OR       Family History   Problem Relation Age of Onset     Cancer Mother      skin cancer     Glaucoma Mother      Skin Cancer Mother      Melanoma Brother        Social History     Social History     Marital status:      Spouse name: N/A     Number of children: N/A     Years of education: N/A     Occupational History     Not on file.     Social History Main Topics     Smoking status: Never Smoker     Smokeless tobacco: Never Used     Alcohol use No     Drug use:  "No     Sexual activity: Not on file     Other Topics Concern     Not on file     Social History Narrative     Problem list and 13 point review of systems: is reviewed in Epic and is negative with the exception of those symptoms associated with HPI and PMH.      OBJECTIVE:   /88  Pulse 63  Ht 1.689 m (5' 6.5\")  Wt 91.1 kg (200 lb 12.8 oz)  SpO2 98%  Breastfeeding? No  BMI 31.92 kg/m2    Imaging:  These are the pertinent radiologist's findings from:  MRI 5/14/18 WO @ [facility].   C5-6: Disc osteophyte complex with super post asymmetric right  uncinate spurring, which indents the right ventrolateral aspect of the  cord and displaces the exiting right C6 nerve roots. Moderate to  advanced right neural foraminal stenosis. Mild spinal canal stenosis.     C6-7: Disc osteophyte complex and asymmetric right uncinate spurring.  Mild spinal canal stenosis. Moderate right and mild left neural  foraminal stenosis.     C7-T1: Asymmetric left disc bulge. No significant spinal canal  stenosis. Mild left neural foraminal stenosis.  See the full report in EPIC/Media tab.  I personally reviewed the images with the patient.      Exam:  *   Well developed, well nourished female found seated comfortably in exam room chair.  Is able to sit and rise independently.    Unaccompanied.    *  Mental Status  A&O X3.  Bright, alert, affable, interactive. Language fluid, fund of knowledge intact. Good historian.  Mood and affect congruent and WNL.   *  Cranial Nerves  II: Able to read printed forms, VF full to gross confrontation.  III: IV, VI:  PERRLA, EOMI, No nystagmus, no ptosis.  V: Sensation intact in bilateral V1, V2, and V3. Jaw clench symmetric.    VII:  Intact to voice bilaterally.  IX:  Pushes tongue against bilateral cheeks.  X:  Palate elevates, uvula midline, phonation intact.  XI: Elevates shoulders, head turn intact.  XII: Tongue midline. No fasciculations.  *  Cervical Spine:  DTR's at Biceps, Triceps, and Brachioradialis " 2/4 and symmetric.  Sensation intact.    No Villareal's. No Phalen's.  No Tinel's. No fasciculations.   Muscle bulk and tone WNL.  Upper Extremity Strength.              RIGHT                LEFT     Deltoid              5/5                   5/5       Biceps              5/5                   5/5        Triceps              5/5                   5/5       Wrist Extensor              5/5                   5/5                     5/5                    5/5       Interossei              5/5                   5/5       EPL              5/5                    5/5       Pinch              5/5                  5/5          *  Lumbar Spine:     DTR's Patellar and Achilles 1/4 and symmetric.   No fasciculations.  Muscle bulk and tone WNL.  No Clonus.  Sensation intact.    Lower Extremity Strength                   RIGHT                   LEFT     Iliopsoas                    5/5                      5/5       Quad                    5/5                        5/5       Hamstring                      5/5                        5/5         Gastrocs                    5/5                        5/5       Tib. Anterior                     5/5                        5/5       EHL                      5/5                        5/5       Babinski                 Down                                      Down                   *  Structural Exam  Inspection of the spine reveals no scoliosis, exaggerated kyphosis or lordosis. Head is balanced over the pelvis in the coronal and sagittal plane.    Cervical spine ROM full. No spasming. No tenderness to palpation.  No Spurling's or L'Hermitte's.   Lumbar ROM full.   Feet are warm.   Gait narrow, smooth, no scissoring. No antalgia.   *  Prem's 5/5 are negative. In studies 3 of 5 positive Prem's have been shown to be positively correlated with high scores for depression, hysteria and hypochondriasis on [place] Multiphasic Personality Inventories and are suggestive for but not  proof of a functional component for pain.   Prem's are:   non anatomic tenderness;   pain with simulated exam, trunk rotation or axial load;    positive distraction tests e.g.. SLR;    regional disturbances, non anatomic pain, numbness, weakness;  overreaction to testing.    ASSESSMENT/PLAN:  1. Cervical radiculopathy        Violinist with multiple levels of cervical spondylosis, probable also  ulnar nerve on the right given her numbness pattern.      C5-6 is the most severely affected along with C6-7, and left C7-T1 ulnar nerve most severe at C5-6 on the right.  I recommend we begin with nonsurgical therapy; Medrol dose pack, and physical therapy with home cervical traction unit.  I recommend avoiding injection therapy for now.  We will not do EMGs as yet unless it becomes necessary to sort out why the small finger on the right is numb and the rest of the fingers on the left are numb.  We will do all this and have her return in about 4 weeks.  All orders are written.    I answered all of her questions, which indicated good understanding of the situation. She is willing to move forward with the recommendations. I supplied her with business cards and telephone numbers for ease of contact.   It's been a pleasure participating in the care of this nice patient. We thank you for your confidence in the [facility], Department of Neurosurgery.      Best Regards,    Danica Colunga PA-C  [facility]  Department of Neurosurgery  Phone: [phone]  Fax: [phone]      Total time: 60 minutes with more than 30 minutes spent in direct face to face contact reviewing films, providing education, counseling, non-operative therapies,and indications for surgery as well as further follow up.    This note was generated using voice recognition software. While edited for content some inaccurate phrasing may be found.

## 2018-08-29 NOTE — LETTER
8/29/2018     RE: Romi Cortez  [address] [facility] [address]     Dear Colleague,    Thank you for referring your patient, Romi Cortez, to the [facility] NEUROSURGERY at [facility]. Please see a copy of my visit note below.    Neurosurgery Clinic Consult  Date of Visit: 8/29/2018    Referring Provider:  Sunni Wick MD    Dear Dr. Wick    We were happy to see Romi Cortez, a pleasant 58 year old year old female for right arm pain present for about 8 months.    HPI: This pleasant lady has had neck pain for about 30 years.  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  In the last several months she started noticing numbness in the fingers, all of the fingers in the left hand when she plays, and the lateral 3 fingers in the right hand when she plays, and her neck pain has started to stray into the bilateral trapezii, worse on the right than the left, and now down the right arm time, to the right forearm and all the way to the right wrist.  The right forearm and elbow area is acutely bad, and is the worst of the pain.  The right hand numbness wakes her in the morning.  It does not seem to get any better if she shakes it out.  She does not feel her arms or hands are weak.  There is no change in the arm numbness or pain with movement of her head but there is when she moves her arms.  Specifically her right arm.  No change in bowel or bladder function she has had a good bit of treatment over the years, chiropractic, massage, physical therapy.  None of it is made much difference for any significant time.  She has never had any shots but she has had an MRI done.  This revealed a fair amount of cervical spondylosis and she presents for evaluation and treatment.    [facility]  Pain -  Patient Entered Questionnaire/Answers 8/24/2018   What number best describes your pain right now:  0 = No pain  to  10 = Worst pain imaginable 5   How would you describe the pain? burning, numbness, dull, aching, other   Which of the following worsen your pain? coughing / sneezing   Which of the following improve or reduce your pain?  nothing relieves the pain   What number best describes your average pain for the past week:  0 = No pain  to  10 = Worst pain imaginable 6   What number best describes your LOWEST pain in past 24 hours:  0 = No pain  to  10 = Worst pain imaginable 4   What number best describes your WORST pain in past 24 hours:  0 = No pain  to  10 = Worst pain imaginable 7   When is your pain worst? Constant   What non-medicine treatments have you already had for your pain? physical therapy, relaxation training, chiropractic care   Have you tried treating your pain with medication?  Yes   Are you currently taking medications for your pain? Yes       Current Outpatient Prescriptions:      ARTIFICIAL TEAR OP, Apply 1 drop to eye as needed Both eyes, 1-2 times a month, Disp: , Rfl:      LamoTRIgine (LAMICTAL PO), Take 200 mg by mouth daily, Disp: , Rfl:      methylPREDNISolone (MEDROL DOSEPAK) 4 MG tablet, Follow package instructions, Disp: 21 tablet, Rfl: 0     sertraline (ZOLOFT) 100 MG tablet, Take by mouth daily, Disp: , Rfl:      triamcinolone (KENALOG) 0.025 % ointment, Apply topically 2 times daily, Disp: , Rfl:     No Known Allergies    No past medical history on file.    Past Surgical History:   Procedure Laterality Date     D & C  1996     LAPAROSCOPIC APPENDECTOMY  11/21/2013    Procedure: LAPAROSCOPIC APPENDECTOMY;  Laparoscopic Appendectomy;  Surgeon: Sung Rodriguez MD;  Location:  OR       Family History   Problem Relation Age of Onset     Cancer Mother      skin cancer     Glaucoma Mother      Skin Cancer Mother      Melanoma Brother        Social History     Social  "History     Marital status:      Spouse name: N/A     Number of children: N/A     Years of education: N/A     Occupational History     Not on file.     Social History Main Topics     Smoking status: Never Smoker     Smokeless tobacco: Never Used     Alcohol use No     Drug use: No     Sexual activity: Not on file     Other Topics Concern     Not on file     Social History Narrative     Problem list and 13 point review of systems: is reviewed in Epic and is negative with the exception of those symptoms associated with HPI and PMH.      OBJECTIVE:   /88  Pulse 63  Ht 1.689 m (5' 6.5\")  Wt 91.1 kg (200 lb 12.8 oz)  SpO2 98%  Breastfeeding? No  BMI 31.92 kg/m2    Imaging:  These are the pertinent radiologist's findings from:  MRI 5/14/18 WO @ [facility].   C5-6: Disc osteophyte complex with super post asymmetric right  uncinate spurring, which indents the right ventrolateral aspect of the  cord and displaces the exiting right C6 nerve roots. Moderate to  advanced right neural foraminal stenosis. Mild spinal canal stenosis.     C6-7: Disc osteophyte complex and asymmetric right uncinate spurring.  Mild spinal canal stenosis. Moderate right and mild left neural  foraminal stenosis.     C7-T1: Asymmetric left disc bulge. No significant spinal canal  stenosis. Mild left neural foraminal stenosis.  See the full report in EPIC/Media tab.  I personally reviewed the images with the patient.      Exam:  *   Well developed, well nourished female found seated comfortably in exam room chair.  Is able to sit and rise independently.    Unaccompanied.    *  Mental Status  A&O X3.  Bright, alert, affable, interactive. Language fluid, fund of knowledge intact. Good historian.  Mood and affect congruent and WNL.   *  Cranial Nerves  II: Able to read printed forms, VF full to gross confrontation.  III: IV, VI:  PERRLA, EOMI, No nystagmus, no ptosis.  V: Sensation intact in bilateral V1, V2, and V3. Jaw clench symmetric.  "   VII:  Intact to voice bilaterally.  IX:  Pushes tongue against bilateral cheeks.  X:  Palate elevates, uvula midline, phonation intact.  XI: Elevates shoulders, head turn intact.  XII: Tongue midline. No fasciculations.  *  Cervical Spine:  DTR's at Biceps, Triceps, and Brachioradialis 2/4 and symmetric.  Sensation intact.    No Villareal's. No Phalen's.  No Tinel's. No fasciculations.   Muscle bulk and tone WNL.  Upper Extremity Strength.              RIGHT                LEFT     Deltoid              5/5                   5/5       Biceps              5/5                   5/5        Triceps              5/5                   5/5       Wrist Extensor              5/5                   5/5                     5/5                    5/5       Interossei              5/5                   5/5       EPL              5/5                    5/5       Pinch              5/5                  5/5          *  Lumbar Spine:     DTR's Patellar and Achilles 1/4 and symmetric.   No fasciculations.  Muscle bulk and tone WNL.  No Clonus.  Sensation intact.    Lower Extremity Strength                   RIGHT                   LEFT     Iliopsoas                    5/5                      5/5       Quad                    5/5                        5/5       Hamstring                      5/5                        5/5         Gastrocs                    5/5                        5/5       Tib. Anterior                     5/5                        5/5       EHL                      5/5                        5/5       Babinski                 Down                                      Down                   *  Structural Exam  Inspection of the spine reveals no scoliosis, exaggerated kyphosis or lordosis. Head is balanced over the pelvis in the coronal and sagittal plane.    Cervical spine ROM full. No spasming. No tenderness to palpation.  No Spurling's or L'Hermitte's.   Lumbar ROM full.   Feet are warm.   Gait narrow,  smooth, no scissoring. No antalgia.   *  Prem's 5/5 are negative. In studies 3 of 5 positive Prem's have been shown to be positively correlated with high scores for depression, hysteria and hypochondriasis on [place] Multiphasic Personality Inventories and are suggestive for but not proof of a functional component for pain.   Prem's are:   non anatomic tenderness;   pain with simulated exam, trunk rotation or axial load;    positive distraction tests e.g.. SLR;    regional disturbances, non anatomic pain, numbness, weakness;  overreaction to testing.    ASSESSMENT/PLAN:  1. Cervical radiculopathy      Violinist with multiple levels of cervical spondylosis, probable also  ulnar nerve on the right given her numbness pattern.      C5-6 is the most severely affected along with C6-7, and left C7-T1 ulnar nerve most severe at C5-6 on the right.  I recommend we begin with nonsurgical therapy; Medrol dose pack, and physical therapy with home cervical traction unit.  I recommend avoiding injection therapy for now.  We will not do EMGs as yet unless it becomes necessary to sort out why the small finger on the right is numb and the rest of the fingers on the left are numb.  We will do all this and have her return in about 4 weeks.  All orders are written.    I answered all of her questions, which indicated good understanding of the situation. She is willing to move forward with the recommendations. I supplied her with business cards and telephone numbers for ease of contact.   It's been a pleasure participating in the care of this nice patient. We thank you for your confidence in the [facility], Department of Neurosurgery.    Best Regards,    Danica Colunga PA-C  [facility]  Department of Neurosurgery  Phone: [phone]  Fax: [phone]    Total time: 60 minutes with more than 30 minutes spent in direct face to face contact reviewing films, providing education, counseling,  non-operative therapies,and indications for surgery as well as further follow up.    This note was generated using voice recognition software. While edited for content some inaccurate phrasing may be found.    Again, thank you for allowing me to participate in the care of your patient.      Sincerely,    Danica Colunga PA-C

## 2018-08-30 ASSESSMENT — PATIENT HEALTH QUESTIONNAIRE - PHQ9: SUM OF ALL RESPONSES TO PHQ QUESTIONS 1-9: 5

## 2018-09-07 ENCOUNTER — THERAPY VISIT (OUTPATIENT)
Dept: PHYSICAL THERAPY | Facility: CLINIC | Age: 58
End: 2018-09-07
Payer: COMMERCIAL

## 2018-09-07 DIAGNOSIS — M54.2 CERVICALGIA: Primary | ICD-10-CM

## 2018-09-07 PROCEDURE — 97012 MECHANICAL TRACTION THERAPY: CPT | Mod: GP | Performed by: PHYSICAL THERAPIST

## 2018-09-07 PROCEDURE — 97161 PT EVAL LOW COMPLEX 20 MIN: CPT | Mod: GP | Performed by: PHYSICAL THERAPIST

## 2018-09-07 PROCEDURE — 97530 THERAPEUTIC ACTIVITIES: CPT | Mod: GP | Performed by: PHYSICAL THERAPIST

## 2018-09-07 NOTE — PROGRESS NOTES
[facility] Initial Evaluation  Subjective:  [facility]    Physical Therapy Initial Evaluation  September 7, 2018     Precautions/Restrictions/MD instructions: PT eval and treat for cervical radiculopathy. Home traction unit   10-15 pounds  For 10-15 minutes BID please supply the unit.      Subjective:   Date of Onset: years, MD order on 8/29  C/C: B upper traps, deep into the shoulder joints, and along B shoulder blades, right greater than left. Also has ache in right forearm. Reports numbness and tingling in her lateral hands  Quality of pain is dull and sharp. Pains are described as intermittent in nature. Pain is worse: on the go. Pain is rated 4/10.   History of symptoms: Pains began gradually as the result of unknown origins, maybe playing the violin. Since onset, symptoms are same. Previous episodes: recurrent neck pain for years, occasional pain down the right arm.  Worsened by: playing her violin, lifting, checking her blind spot, reaching overhead  Alleviated by: Rest, PT and steroids.    General health as reported by patient: good  Pertinent medical/surgical history: concussion, depression, hx of vertigo occasionally, numbness and tingling, overweight, sleep apnea. She denies night pain, painful cough, painful peripheral motor deficit, recent bowel/bladder change, recent vision change, ringing in the ears or pain with swallowing, unexplained weight loss, significant current illness or recent hospital admission. She denies any regional implanted devices. She denies history of surgery in the area.  Imaging: x-ray and MRI. Current occupational status: violinist. Patient's goals are: decrease pain, resume playing violin, improve tolerance to writing, improve gripping Return to MD:  1 month    Objective:  CERVICAL:    Posture: min forward shoulder posture  Posture Correction: none    Neurological:    Motor Deficit:  Myotomes L R   C4 (shoulder elevation) 5/5* 5/5*   C5 (shoulder abduction) 4/5*  4/5*   C6 (elbow flexion) 5/5 5/5   C7 (elbow extension) 5/5* 5/5*   C8 (thumb extension) 5/5* 5/5*   T1 (finger add/abd) 5/5* 5/5*    Strength (lb) 5/5* 4/5*     Sensory Deficit, Reflexes, Dural Signs: B reflexes 1+ at brachialis, triceps and biceps. Reduced light touch sensation in T1 dermatome on right, improves following traction. All other dermatomes intact to light touch sensation    AROM: (Major, Moderate, Minimal or Nil loss)  Movement Loss Ashutosh Mod Min Nil Pain   Protrusion  x   x   Flexion  x   x   Retraction   x  x   Extension  x   x   Left Rotation   x  xx   Right Rotation   x  x   Left Side Bending   x  x   Right Side bending   x  xx     Assessment/Plan:    The patient is a 58 year old female with chief complaint of neck and shoulder pain.    The patient has the following significant findings with corresponding treatment plan.  Diagnosis 1:  Cervical radiculopathy   Pain -  mechanical traction, manual therapy, splint/taping/bracing/orthotics, self management, education, directional preference exercise and home program  Decreased ROM/flexibility - manual therapy and therapeutic exercise  Decreased strength - therapeutic exercise and therapeutic activities  Impaired muscle performance - neuro re-education  Decreased function - therapeutic activities  Impaired posture - neuro re-education    Therapy Evaluation Codes:   1) History comprised of:   Personal factors that impact the plan of care:      Time since onset of symptoms and Work status.    Comorbidity factors that impact the plan of care are:      Concussion, Depression, Numbness/tingling, Overweight and Sleep disorder/apnea.     Medications impacting care: Anti-depressant.  2) Examination of Body Systems comprised of:   Body structures and functions that impact the plan of care:      Cervical spine, Shoulder and Thoracic Spine.   Activity limitations that impact the plan of care are:      Bathing, Cooking, Driving, Dressing, Lifting, Working and  Sleeping.   Clinical presentation characteristics are:    Stable/Uncomplicated.  3) Presentation comprised of:   Presentation scored as Low complexity with uncomplicated characteristics..  4) Decision-Making    Low complexity using standardized patient assessment instrument and/or measureable assessment of functional outcome.  Cumulative Therapy Evaluation is: Low complexity.    Previous and current functional limitations:  (See Goal Flow Sheet for this information)    Short term and Long term goals: (See Goal Flow Sheet for this information)     Communication ability:  Patient appears to be able to clearly communicate and understand verbal and written communication and follow directions correctly.  Treatment Explanation - The following has been discussed with the patient: RX ordered/plan of care, anticipated outcomes, and possible risks and side effects.  This patient would benefit from PT intervention to resume normal activities.   Rehab potential is good.    Frequency:  2 X week, once daily  Duration:  for 3 weeks  Discharge Plan: Achieve all LTGs, be independent in home treatment program, and reach maximal therapeutic benefit.    Please refer to the daily flowsheet for treatment today, total treatment time and time spent performing 1:1 timed codes.                     Objective:  System    Physical Exam    General     ROS

## 2018-09-07 NOTE — MR AVS SNAPSHOT
After Visit Summary   9/7/2018    Romi Cortez    MRN: [id number]           Patient Information     Date Of Birth          1960        Visit Information        Provider Department      9/7/2018 11:30 AM Desi Kaba, PT [facility] Physical [facility]        Today's Diagnoses     Cervicalgia    -  1       Follow-ups after your visit        Your next 10 appointments already scheduled     Sep 12, 2018  2:00 PM CDT   MADI Spine with Jean Pennington PT   [facility] ([facility]  )    [address] [place] [id number]   [phone]            Sep 14, 2018  8:10 AM CDT   MADI Spine with Anurag Brooks PT   [facility] ([facility]  )    [address] [place] [id number]   [phone]            Sep 26, 2018  1:30 PM CDT   (Arrive by 1:15 PM)   Return Visit with Danica Colunga PA-C   [facility] ([facility])    [address] [place] [id number] [phone]              Who to contact     If you have questions or need follow up information about today's clinic visit or your schedule please contact [facility]AP[facility] directly at [phone].  Normal or non-critical lab and imaging results will be communicated to you by MyChart, letter or phone within 4 business days after the clinic has received the results. If you do not hear from us within 7 days, please contact the clinic through MyChart or phone. If you have a critical or abnormal lab result, we will notify you by phone as soon as possible.  Submit refill requests through [employer] or call your pharmacy and they will forward the refill request to us. Please allow 3 business days for your refill to be completed.          Additional Information About Your Visit         [email or web address] Information     [email or web address] gives you secure access to your electronic health record. If you see a primary care provider, you can also send messages to your care team and make appointments. If you have questions, please call your primary care clinic.  If you do not have a primary care provider, please call [phone] and they will assist you.        Care EveryWhere ID     This is your Care EveryWhere ID. This could be used by other organizations to access your [place] medical records  [id number]         Blood Pressure from Last 3 Encounters:   08/29/18 133/88   08/13/18 117/85   11/08/17 128/84    Weight from Last 3 Encounters:   08/29/18 91.1 kg (200 lb 12.8 oz)   08/13/18 89.4 kg (197 lb)   11/08/17 97.1 kg (214 lb)              We Performed the Following     HC PT EVAL, LOW COMPLEXITY     MADI INITIAL EVAL REPORT     MECHANICAL TRACTION THERAPY     THERAPEUTIC ACTIVITIES        Primary Care Provider Office Phone # Fax #    Mayra Persaud [phone]       [facility] [address] [facility] [address]        Equal Access to Services     ARINA PURDY AH: Hadii aad ku hadasho Soomaali, waaxda luqadaha, qaybta kaalmada adeegyada, anthony garcia hayfrancis ward . So [facility] [phone].    ATENCIÓN: Si habla español, tiene a arellano disposición servicios gratlovelyos de asistencia lingüística. [facility] [phone].    We comply with applicable federal civil rights laws and [place] laws. We do not discriminate on the basis of race, color, national origin, age, disability, sex, sexual orientation, or gender identity.            Thank you!     Thank you for choosing [facility]  for your care. Our goal is always to provide you with excellent care. Hearing back from our patients is one way we can continue to improve our services. Please take a few minutes to complete the written survey that you may receive in the mail after your  visit with us. Thank you!             Your Updated Medication List - Protect others around you: Learn how to safely use, store and throw away your medicines at www.disposemymeds.org.          This list is accurate as of 9/7/18  9:49 PM.  Always use your most recent med list.                   Brand Name Dispense Instructions for use Diagnosis    ARTIFICIAL TEAR OP      Apply 1 drop to eye as needed Both eyes, 1-2 times a month        LAMICTAL PO      Take 200 mg by mouth daily        methylPREDNISolone 4 MG tablet    MEDROL DOSEPAK    21 tablet    Follow package instructions    Cervical radiculopathy       sertraline 100 MG tablet    ZOLOFT     Take by mouth daily        triamcinolone 0.025 % ointment    KENALOG     Apply topically 2 times daily

## 2018-09-12 ENCOUNTER — THERAPY VISIT (OUTPATIENT)
Dept: PHYSICAL THERAPY | Facility: CLINIC | Age: 58
End: 2018-09-12
Payer: COMMERCIAL

## 2018-09-12 DIAGNOSIS — M54.2 CERVICALGIA: ICD-10-CM

## 2018-09-12 PROCEDURE — 97112 NEUROMUSCULAR REEDUCATION: CPT | Mod: GP | Performed by: PHYSICAL THERAPIST

## 2018-09-12 PROCEDURE — 97012 MECHANICAL TRACTION THERAPY: CPT | Mod: GP | Performed by: PHYSICAL THERAPIST

## 2018-09-13 ENCOUNTER — TRANSFERRED RECORDS (OUTPATIENT)
Dept: HEALTH INFORMATION MANAGEMENT | Facility: CLINIC | Age: 58
End: 2018-09-13

## 2018-09-14 ENCOUNTER — THERAPY VISIT (OUTPATIENT)
Dept: PHYSICAL THERAPY | Facility: CLINIC | Age: 58
End: 2018-09-14
Payer: COMMERCIAL

## 2018-09-14 DIAGNOSIS — M54.2 CERVICALGIA: ICD-10-CM

## 2018-09-14 PROCEDURE — 97012 MECHANICAL TRACTION THERAPY: CPT | Mod: GP | Performed by: PHYSICAL THERAPIST

## 2018-09-14 PROCEDURE — 97530 THERAPEUTIC ACTIVITIES: CPT | Mod: GP | Performed by: PHYSICAL THERAPIST

## 2018-09-18 ENCOUNTER — THERAPY VISIT (OUTPATIENT)
Dept: PHYSICAL THERAPY | Facility: CLINIC | Age: 58
End: 2018-09-18
Payer: COMMERCIAL

## 2018-09-18 DIAGNOSIS — M54.2 CERVICALGIA: ICD-10-CM

## 2018-09-18 PROCEDURE — 97530 THERAPEUTIC ACTIVITIES: CPT | Mod: GP | Performed by: PHYSICAL THERAPIST

## 2018-09-18 PROCEDURE — 97012 MECHANICAL TRACTION THERAPY: CPT | Mod: GP | Performed by: PHYSICAL THERAPIST

## 2018-09-26 ENCOUNTER — THERAPY VISIT (OUTPATIENT)
Dept: PHYSICAL THERAPY | Facility: CLINIC | Age: 58
End: 2018-09-26
Payer: COMMERCIAL

## 2018-09-26 DIAGNOSIS — M54.2 CERVICALGIA: ICD-10-CM

## 2018-09-26 PROCEDURE — 97530 THERAPEUTIC ACTIVITIES: CPT | Mod: GP | Performed by: PHYSICAL THERAPIST

## 2018-09-26 PROCEDURE — 97012 MECHANICAL TRACTION THERAPY: CPT | Mod: GP | Performed by: PHYSICAL THERAPIST

## 2018-10-03 ENCOUNTER — THERAPY VISIT (OUTPATIENT)
Dept: PHYSICAL THERAPY | Facility: CLINIC | Age: 58
End: 2018-10-03
Payer: COMMERCIAL

## 2018-10-03 DIAGNOSIS — M54.2 CERVICALGIA: ICD-10-CM

## 2018-10-03 PROCEDURE — 97012 MECHANICAL TRACTION THERAPY: CPT | Mod: GP | Performed by: PHYSICAL THERAPIST

## 2018-10-03 PROCEDURE — 97530 THERAPEUTIC ACTIVITIES: CPT | Mod: GP | Performed by: PHYSICAL THERAPIST

## 2018-10-10 ENCOUNTER — THERAPY VISIT (OUTPATIENT)
Dept: PHYSICAL THERAPY | Facility: CLINIC | Age: 58
End: 2018-10-10
Payer: COMMERCIAL

## 2018-10-10 DIAGNOSIS — M54.2 CERVICALGIA: ICD-10-CM

## 2018-10-10 PROCEDURE — 97012 MECHANICAL TRACTION THERAPY: CPT | Mod: GP | Performed by: PHYSICAL THERAPIST

## 2018-10-10 PROCEDURE — 97110 THERAPEUTIC EXERCISES: CPT | Mod: GP | Performed by: PHYSICAL THERAPIST

## 2018-10-10 NOTE — MR AVS SNAPSHOT
After Visit Summary   10/10/2018    Romi Cortez    MRN: [id number]           Patient Information     Date Of Birth          1960        Visit Information        Provider Department      10/10/2018 10:10 AM Jean Pennington PT [facility]        Today's Diagnoses     Cervicalgia           Follow-ups after your visit        Your next 10 appointments already scheduled     Oct 17, 2018  2:30 PM CDT   (Arrive by 2:15 PM)   Return Visit with Danica Colunga PA-C   [facility] ([facility] [place])    [address] [place] [id number] [phone]              Who to contact     If you have questions or need follow up information about today's clinic visit or your schedule please contact [facility] directly at [phone].  Normal or non-critical lab and imaging results will be communicated to you by MyChart, letter or phone within 4 business days after the clinic has received the results. If you do not hear from us within 7 days, please contact the clinic through [employer] or phone. If you have a critical or abnormal lab result, we will notify you by phone as soon as possible.  Submit refill requests through [employer] or call your pharmacy and they will forward the refill request to us. Please allow 3 business days for your refill to be completed.          Additional Information About Your Visit        MyChart Information     [employer] gives you secure access to your electronic health record. If you see a primary care provider, you can also send messages to your care team and make appointments. If you have questions, please call your primary care clinic.  If you do not have a primary care provider, please call [phone] and they will assist you.        Care EveryWhere ID     This is your Care EveryWhere ID. This could be used by other  organizations to access your [facility] medical records  [id number]         Blood Pressure from Last 3 Encounters:   08/29/18 133/88   08/13/18 117/85   11/08/17 128/84    Weight from Last 3 Encounters:   08/29/18 91.1 kg (200 lb 12.8 oz)   08/13/18 89.4 kg (197 lb)   11/08/17 97.1 kg (214 lb)              We Performed the Following     MECHANICAL TRACTION THERAPY     THERAPEUTIC EXERCISES        Primary Care Provider Office Phone # Fax #    Mayra Persaud [phone]       [facility] [address] [facility] [address]        Equal Access to Services     ARINA PURDY : Hadii leonardo manning hadasho [place], waaxda luqadaha, qaybta kaalmada adeegyada, anthony ryan. So [facility] [phone].    ATENCIÓN: Si habla español, tiene a arellano disposición servicios gratuitos de asistencia lingüística. Llame al [phone].    We comply with applicable federal civil rights laws and [place] laws. We do not discriminate on the basis of race, color, national origin, age, disability, sex, sexual orientation, or gender identity.            Thank you!     Thank you for choosing [facility] PH[facility]ICAL THERAP[facility]  for your care. Our goal is always to provide you with excellent care. Hearing back from our patients is one way we can continue to improve our services. Please take a few minutes to complete the written survey that you may receive in the mail after your visit with us. Thank you!             Your Updated Medication List - Protect others around you: Learn how to safely use, store and throw away your medicines at www.disposemymeds.org.          This list is accurate as of 10/10/18 11:59 PM.  Always use your most recent med list.                   Brand Name Dispense Instructions for use Diagnosis    ARTIFICIAL TEAR OP      Apply 1 drop to eye as needed Both eyes, 1-2 times a month        LAMICTAL PO      Take 200 mg by mouth daily         methylPREDNISolone 4 MG tablet    MEDROL DOSEPAK    21 tablet    Follow package instructions    Cervical radiculopathy       sertraline 100 MG tablet    ZOLOFT     Take by mouth daily        triamcinolone 0.025 % ointment    KENALOG     Apply topically 2 times daily

## 2018-10-11 NOTE — PROGRESS NOTES
Subjective:  HPI                    Objective:  System    Physical Exam    General     ROS    Assessment/Plan:    PROGRESS  REPORT    Progress reporting period is from 9/7/18 to 10/10/18. Pt has attended 7 PT sessions focusing on mechanical traction for R sided Cervical Radiculopathy. Pt reports improvement in cervical AROM throughout PT, but pt unsure if her radicular complaints have significantly improved. Pt has refrained from playing the violin over the past month but will plan on playing this weekend as a test    SUBJECTIVE  Subjective: Pt reports doing better overall, feels like her neck ROM has improved considerably. Still has intermittent n/t into R lateral hand with certain UE activities. Overall feels like she has made progress and will attempt to play the violin this weekend as a true test.     Current pain level is 4/10  .     Initial Pain level: 4/10.   Changes in function:  Yes (See Goal flowsheet attached for changes in current functional level)  Adverse reaction to treatment or activity: None    OBJECTIVE  Changes noted in objective findings:  Yes,   Mild loss of Cervical AROM R Rotation with pain, all other motions painfree today  WNL UE myotomes bilaterally  Pt to obtain home traction unit and continue traction independently    ASSESSMENT/PLAN  Updated problem list and treatment plan: Diagnosis 1:  Cervical Radiculopathy    STG/LTGs have been met or progress has been made towards goals:  Yes (See Goal flow sheet completed today.)  Assessment of Progress: The patient's condition is improving.  Self Management Plans:  Patient has been instructed in a home treatment program.  I have re-evaluated this patient and find that the nature, scope, duration and intensity of the therapy is appropriate for the medical condition of the patient.  Romi continues to require the following intervention to meet STG and LTG's:  PT    Recommendations:  This patient would benefit from continued therapy.     Frequency:   1 X week, once daily  Duration:  for 4 weeks        Please refer to the daily flowsheet for treatment today, total treatment time and time spent performing 1:1 timed codes.

## 2018-10-17 ENCOUNTER — OFFICE VISIT (OUTPATIENT)
Dept: NEUROSURGERY | Facility: CLINIC | Age: 58
End: 2018-10-17
Payer: COMMERCIAL

## 2018-10-17 VITALS
OXYGEN SATURATION: 97 % | HEART RATE: 76 BPM | HEIGHT: 66 IN | WEIGHT: 203 LBS | DIASTOLIC BLOOD PRESSURE: 81 MMHG | SYSTOLIC BLOOD PRESSURE: 117 MMHG | BODY MASS INDEX: 32.62 KG/M2

## 2018-10-17 DIAGNOSIS — M54.12 CERVICAL RADICULOPATHY: Primary | ICD-10-CM

## 2018-10-17 RX ORDER — METHYLPREDNISOLONE 4 MG
TABLET, DOSE PACK ORAL
Qty: 21 TABLET | Refills: 0 | Status: SHIPPED | OUTPATIENT
Start: 2018-10-17 | End: 2023-02-16

## 2018-10-17 RX ORDER — INFLUENZA A VIRUS A/GUANGDONG-MAONAN/SWL1536/2019 CNIC-1909 (H1N1) ANTIGEN (FORMALDEHYDE INACTIVATED), INFLUENZA A VIRUS A/HONG KONG/2671/2019 (H3N2) ANTIGEN (FORMALDEHYDE INACTIVATED), INFLUENZA B VIRUS B/PHUKET/3073/2013 ANTIGEN (FORMALDEHYDE INACTIVATED), AND INFLUENZA B VIRUS B/WASHINGTON/02/2019 ANTIGEN (FORMALDEHYDE INACTIVATED) 15; 15; 15; 15 UG/.5ML; UG/.5ML; UG/.5ML; UG/.5ML
INJECTION, SUSPENSION INTRAMUSCULAR
COMMUNITY
Start: 2018-09-16 | End: 2019-02-25

## 2018-10-17 ASSESSMENT — PAIN SCALES - GENERAL: PAINLEVEL: MILD PAIN (2)

## 2018-10-17 NOTE — PROGRESS NOTES
Neurosurgery Clinic Follow-up  Date of Visit 10/17/18    Referring Provider:  Sunni Wick MD    Dear Dr. Wick    We were happy to see Romi Cortez, a pleasant 58 year old year old female for right arm pain present for about 9 months.    HPI: This pleasant lady has had neck pain for about 30 years.  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  In the last several months she started noticing numbness in the fingers, all of the fingers in the left hand when she plays, and the lateral 3 fingers in the right hand when she plays, and her neck pain has started to stray into the bilateral trapezii, worse on the right than the left, and now down the right arm time, to the right forearm and all the way to the right wrist.  The right forearm and elbow area is acutely bad, and is the worst of the pain.  The right hand numbness wakes her in the morning.  It does not seem to get any better if she shakes it out.  She does not feel her arms or hands are weak.  There is no change in the arm numbness or pain with movement of her head but there is when she moves her arms.  Specifically her right arm.  No change in bowel or bladder function she has had a good bit of treatment over the years, chiropractic, massage, physical therapy.  None of it is made much difference for any significant time.  She has never had any shots but she has had an MRI done.  This revealed a fair amount of cervical spondylosis and she presents for evaluation and treatment.    At last visit I thought she has cervical spondylosis at C5-6, and C6-7.  C5-6 most likely the worst culprit for her right arm symptoms.  She also has some spondylosis at C7/T1 which may be causing the left hand symptoms, although that also could be ulnar.  I would have to do EMGs to  sort that out.    I recommended we start with a Medrol Dosepak, physical therapy, and a home cervical traction unit.  It has been 6 weeks.  Her neck pain is distinctly better.  She has not noticed a whole lot of difference in her arms yet.  She tried playing her violin a couple of days ago and her right forearm flared up a little bit.  She feels discouraged but not hopeless.    [facility] -  Patient Entered Questionnaire/Answers 10/17/2018   What number best describes your pain right now:  0 = No pain  to  10 = Worst pain imaginable 2   How would you describe the pain? numbness, dull, aching   Which of the following worsen your pain? exercise   Which of the following improve or reduce your pain?  -   What number best describes your average pain for the past week:  0 = No pain  to  10 = Worst pain imaginable 3   What number best describes your LOWEST pain in past 24 hours:  0 = No pain  to  10 = Worst pain imaginable 2   What number best describes your WORST pain in past 24 hours:  0 = No pain  to  10 = Worst pain imaginable 4   When is your pain worst? AM, PM, Night   What non-medicine treatments have you already had for your pain? physical therapy, chiropractic care, counseling, exercise, other   Have you tried treating your pain with medication?  Yes   Are you currently taking medications for your pain? No       Current Outpatient Prescriptions:      FLUZONE QUADRIVALENT injection, , Disp: , Rfl:      LamoTRIgine (LAMICTAL PO), Take 200 mg by mouth daily, Disp: , Rfl:      methylPREDNISolone (MEDROL DOSEPAK) 4 MG tablet, Follow package instructions, Disp: 21 tablet, Rfl: 0     sertraline (ZOLOFT) 100 MG tablet, Take by mouth daily, Disp: , Rfl:      triamcinolone (KENALOG) 0.025 % ointment, Apply topically 2 times daily, Disp: , Rfl:      ARTIFICIAL TEAR OP, Apply 1 drop to eye as needed Both eyes, 1-2 times a month, Disp: , Rfl:      methylPREDNISolone (MEDROL DOSEPAK) 4 MG tablet, Follow package instructions  "(Patient not taking: Reported on 10/17/2018), Disp: 21 tablet, Rfl: 0    No Known Allergies      Past medical history, family history, social, and problem list: reviewed in Epic.  A    OBJECTIVE:   /81 (BP Location: Left arm)  Pulse 76  Ht 1.664 m (5' 5.5\")  Wt 92.1 kg (203 lb)  SpO2 97%  BMI 33.27 kg/m2    Imaging:  These are the pertinent radiologist's findings from:  MRI 5/14/18 WO @ [facility].   C5-6: Disc osteophyte complex with super post asymmetric right  uncinate spurring, which indents the right ventrolateral aspect of the  cord and displaces the exiting right C6 nerve roots. Moderate to  advanced right neural foraminal stenosis. Mild spinal canal stenosis.     C6-7: Disc osteophyte complex and asymmetric right uncinate spurring.  Mild spinal canal stenosis. Moderate right and mild left neural  foraminal stenosis.     C7-T1: Asymmetric left disc bulge. No significant spinal canal  stenosis. Mild left neural foraminal stenosis.  See the full report in [facility]/Media tab.  I personally reviewed the images with the patient.      Exam:  *   Well developed, well nourished female found seated comfortably in exam room chair.  Is able to sit and rise independently.    Unaccompanied.    *  Mental Status  A&O X3.  Bright, alert, affable, interactive. Language fluid, fund of knowledge intact. Good historian.  Mood and affect congruent and WNL.   *  Cranial Nerves  II: Able to read printed forms, VF full to gross confrontation.  III: IV, VI:  PERRLA, EOMI, No nystagmus, no ptosis.  V: Sensation intact in bilateral V1, V2, and V3. Jaw clench symmetric.    VII:  Intact to voice bilaterally.  IX:  Pushes tongue against bilateral cheeks.  X:  Palate elevates, uvula midline, phonation intact.  XI: Elevates shoulders, head turn intact.  XII: Tongue midline. No fasciculations.  *  Cervical Spine:  DTR's at Biceps, Triceps, and Brachioradialis 2/4 and symmetric.  Sensation intact.    No Villareal's. No Phalen's.  No Tinel's. " No fasciculations.   Muscle bulk and tone WNL.  Upper Extremity Strength.              RIGHT                LEFT     Deltoid              5/5                   5/5       Biceps              5/5                   5/5        Triceps              5/5                   5/5       Wrist Extensor              5/5                   5/5                     5/5                    5/5       Interossei              5/5                   5/5       EPL              5/5                    5/5       Pinch              5/5                  5/5          *  Lumbar Spine:     DTR's Patellar and Achilles 1/4 and symmetric.   No fasciculations.  Muscle bulk and tone WNL.  No Clonus.  Sensation intact.    Lower Extremity Strength                   RIGHT                   LEFT     Iliopsoas                    5/5                      5/5       Quad                    5/5                        5/5       Hamstring                      5/5                        5/5         Gastrocs                    5/5                        5/5       Tib. Anterior                     5/5                        5/5       EHL                      5/5                        5/5       Babinski                 Down                                      Down                   *  Structural Exam  Inspection of the spine reveals no scoliosis, exaggerated kyphosis or lordosis. Head is balanced over the pelvis in the coronal and sagittal plane.    Cervical spine ROM full. No spasming. No tenderness to palpation.  No Spurling's or L'Hermitte's.   Lumbar ROM full.   Feet are warm.   Gait narrow, smooth, no scissoring. No antalgia.       ASSESSMENT/PLAN:  1. Cervical radiculopathy      This pleasant violinist has multiple levels of cervical spondylosis causing right arm symptoms , and also possibly an ulnar neuropathy on the left.  I recommend we continue with the nonsurgical therapy, Medrol Dosepak, complete her current round of physical therapy, and get her home  traction unit assigned to her home.  She did not pick it up yet because she wanted to check to see if it was really useful.  She is in fact improving clinically.  Her neck pain is better all the arms have not started to respond yet.  I think we can say we're making progress.  I am hesitant to order injections into the cervical spine, for obvious anatomic reasons, especially in a patient who is improving without such invasive techniques.  I will give this another couple of months and see her again; if she has not improved any further, or has started to backslide we will consider injections versus going direct to surgical intervention.  I hesitate to do the latter, but would recommend doing so if she begins to lose function.    I answered all of her questions, which indicated good understanding of the situation. She is willing to move forward with the recommendations. I supplied her with business cards and telephone numbers for ease of contact.   It's been a pleasure participating in the care of this nice patient. We thank you for your confidence in the [facility], Department of Neurosurgery.      Best Regards,    Danica Colunga PA-C  [facility]  Department of Neurosurgery  Phone: [phone]  Fax: [phone]      Total time: 60 minutes with more than 30 minutes spent in direct face to face contact reviewing films, providing education, counseling, non-operative therapies,and indications for surgery as well as further follow up.    This note was generated using voice recognition software. While edited for content some inaccurate phrasing may be found.

## 2018-10-17 NOTE — MR AVS SNAPSHOT
"              After Visit Summary   10/17/2018    Romi Cortez    MRN: [id number]           Patient Information     Date Of Birth          1960        Visit Information        Provider Department      10/17/2018 2:30 PM Danica Colunga PA-C [facility] Neurosurgery        Today's Diagnoses     Cervical radiculopathy    -  1       Follow-ups after your visit        Your next 10 appointments already scheduled     Dec 17, 2018 10:30 AM CST   (Arrive by 10:15 AM)   Return Visit with GIFTY Pat [facility] Neurosurgery ([facility])    [address] [place] [id number] [phone]              Who to contact     Please call your clinic at [phone] to:    Ask questions about your health    Make or cancel appointments    Discuss your medicines    Learn about your test results    Speak to your doctor            Additional Information About Your Visit        [email or web address] Information     [employer] gives you secure access to your electronic health record. If you see a primary care provider, you can also send messages to your care team and make appointments. If you have questions, please call your primary care clinic.  If you do not have a primary care provider, please call [phone] and they will assist you.      [employer] is an electronic gateway that provides easy, online access to your medical records. With [employer], you can request a clinic appointment, read your test results, renew a prescription or communicate with your care team.     To access your existing account, please contact your [facility] or call [phone] for assistance.        Care EveryWhere ID     This is your Care EveryWhere ID. This could be used by other organizations to access your [place] medical records  [id number]        Your Vitals Were     Pulse Height Pulse Oximetry BMI (Body Mass Index)          76 1.664 m (5' 5.5\") 97% 33.27 kg/m2         " Blood Pressure from Last 3 Encounters:   10/17/18 117/81   08/29/18 133/88   08/13/18 117/85    Weight from Last 3 Encounters:   10/17/18 92.1 kg (203 lb)   08/29/18 91.1 kg (200 lb 12.8 oz)   08/13/18 89.4 kg (197 lb)              Today, you had the following     No orders found for display         Today's Medication Changes          These changes are accurate as of 10/17/18  3:28 PM.  If you have any questions, ask your nurse or doctor.               These medicines have changed or have updated prescriptions.        Dose/Directions    * methylPREDNISolone 4 MG tablet   Commonly known as:  MEDROL DOSEPAK   This may have changed:  Another medication with the same name was added. Make sure you understand how and when to take each.   Used for:  Cervical radiculopathy   Changed by:  Danica Colunga PA-C        Follow package instructions   Quantity:  21 tablet   Refills:  0       * methylPREDNISolone 4 MG tablet   Commonly known as:  MEDROL DOSEPAK   This may have changed:  You were already taking a medication with the same name, and this prescription was added. Make sure you understand how and when to take each.   Used for:  Cervical radiculopathy   Changed by:  Danica Colunga PA-C        Follow package instructions   Quantity:  21 tablet   Refills:  0       * Notice:  This list has 2 medication(s) that are the same as other medications prescribed for you. Read the directions carefully, and ask your doctor or other care provider to review them with you.         Where to get your medicines      Some of these will need a paper prescription and others can be bought over the counter.  Ask your nurse if you have questions.     Bring a paper prescription for each of these medications     methylPREDNISolone 4 MG tablet                Primary Care Provider Office Phone # Fax #    Mayra Persaud [phone]       [facility] [address] [facility] [address]        Equal Access to Services      ARINA [facility] CenterSTEPHANIE : Hadii aad ku kristin Mullins, waaxda luqadaha, qaybta kaalmada aderonnie, anthony jose jacquelynsancho nievespatriciajordin ward . So [facility] [phone].    ATENCIÓN: Si habla español, tiene a arellano disposición servicios gratuitos de asistencia lingüística. Llame al [phone].    We comply with applicable federal civil rights laws and [place] laws. We do not discriminate on the basis of race, color, national origin, age, disability, sex, sexual orientation, or gender identity.            Thank you!     Thank you for choosing [facility] NEUROS[facility]  for your care. Our goal is always to provide you with excellent care. Hearing back from our patients is one way we can continue to improve our services. Please take a few minutes to complete the written survey that you may receive in the mail after your visit with us. Thank you!             Your Updated Medication List - Protect others around you: Learn how to safely use, store and throw away your medicines at www.disposemymeds.org.          This list is accurate as of 10/17/18  3:28 PM.  Always use your most recent med list.                   Brand Name Dispense Instructions for use Diagnosis    ARTIFICIAL TEAR OP      Apply 1 drop to eye as needed Both eyes, 1-2 times a month        FLUZONE QUADRIVALENT injection   Generic drug:  influenza quadrivalent (w/PRESERVATIVE) vacc age 3 yrs and older           LAMICTAL PO      Take 200 mg by mouth daily        * methylPREDNISolone 4 MG tablet    MEDROL DOSEPAK    21 tablet    Follow package instructions    Cervical radiculopathy       * methylPREDNISolone 4 MG tablet    MEDROL DOSEPAK    21 tablet    Follow package instructions    Cervical radiculopathy       sertraline 100 MG tablet    ZOLOFT     Take by mouth daily        triamcinolone 0.025 % ointment    KENALOG     Apply topically 2 times daily        * Notice:  This list has 2 medication(s) that are the same as other medications prescribed for you. Read the  directions carefully, and ask your doctor or other care provider to review them with you.

## 2018-10-17 NOTE — LETTER
10/17/2018       RE: Romi Cortez  [address] [facility] [address]     Dear Colleague,    Thank you for referring your patient, Romi Cortez, to the [facility] NEUROSURGERY at [facility]. Please see a copy of my visit note below.      Neurosurgery Clinic Follow-up  Date of Visit 10/17/18    Referring Provider:  Sunni Wick MD    Dear Dr. Wick    We were happy to see Romi Cortez, a pleasant 58 year old year old female for right arm pain present for about 9 months.    HPI: This pleasant lady has had neck pain for about 30 years.  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  In the last several months she started noticing numbness in the fingers, all of the fingers in the left hand when she plays, and the lateral 3 fingers in the right hand when she plays, and her neck pain has started to stray into the bilateral trapezii, worse on the right than the left, and now down the right arm time, to the right forearm and all the way to the right wrist.  The right forearm and elbow area is acutely bad, and is the worst of the pain.  The right hand numbness wakes her in the morning.  It does not seem to get any better if she shakes it out.  She does not feel her arms or hands are weak.  There is no change in the arm numbness or pain with movement of her head but there is when she moves her arms.  Specifically her right arm.  No change in bowel or bladder function she has had a good bit of treatment over the years, chiropractic, massage, physical therapy.  None of it is made much difference for any significant time.  She has never had any shots but she has had an MRI done.  This revealed a fair amount of cervical spondylosis and she presents for evaluation and  treatment.    At last visit I thought she has cervical spondylosis at C5-6, and C6-7.  C5-6 most likely the worst culprit for her right arm symptoms.  She also has some spondylosis at C7/T1 which may be causing the left hand symptoms, although that also could be ulnar.  I would have to do EMGs to sort that out.    I recommended we start with a Medrol Dosepak, physical therapy, and a home cervical traction unit.  It has been 6 weeks.  Her neck pain is distinctly better.  She has not noticed a whole lot of difference in her arms yet.  She tried playing her violin a couple of days ago and her right forearm flared up a little bit.  She feels discouraged but not hopeless.    [facility] -  Patient Entered Questionnaire/Answers 10/17/2018   What number best describes your pain right now:  0 = No pain  to  10 = Worst pain imaginable 2   How would you describe the pain? numbness, dull, aching   Which of the following worsen your pain? exercise   Which of the following improve or reduce your pain?  -   What number best describes your average pain for the past week:  0 = No pain  to  10 = Worst pain imaginable 3   What number best describes your LOWEST pain in past 24 hours:  0 = No pain  to  10 = Worst pain imaginable 2   What number best describes your WORST pain in past 24 hours:  0 = No pain  to  10 = Worst pain imaginable 4   When is your pain worst? AM, PM, Night   What non-medicine treatments have you already had for your pain? physical therapy, chiropractic care, counseling, exercise, other   Have you tried treating your pain with medication?  Yes   Are you currently taking medications for your pain? No       Current Outpatient Prescriptions:      FLUZONE QUADRIVALENT injection, , Disp: , Rfl:      LamoTRIgine (LAMICTAL PO), Take 200 mg by mouth daily, Disp: , Rfl:      methylPREDNISolone (MEDROL DOSEPAK) 4 MG tablet, Follow package instructions, Disp: 21 tablet, Rfl: 0     sertraline (ZOLOFT) 100 MG tablet, Take by mouth  "daily, Disp: , Rfl:      triamcinolone (KENALOG) 0.025 % ointment, Apply topically 2 times daily, Disp: , Rfl:      ARTIFICIAL TEAR OP, Apply 1 drop to eye as needed Both eyes, 1-2 times a month, Disp: , Rfl:      methylPREDNISolone (MEDROL DOSEPAK) 4 MG tablet, Follow package instructions (Patient not taking: Reported on 10/17/2018), Disp: 21 tablet, Rfl: 0    No Known Allergies      Past medical history, family history, social, and problem list: reviewed in Epic.  A    OBJECTIVE:   /81 (BP Location: Left arm)  Pulse 76  Ht 1.664 m (5' 5.5\")  Wt 92.1 kg (203 lb)  SpO2 97%  BMI 33.27 kg/m2    Imaging:  These are the pertinent radiologist's findings from:  MRI 5/14/18 WO @ [facility].   C5-6: Disc osteophyte complex with super post asymmetric right  uncinate spurring, which indents the right ventrolateral aspect of the  cord and displaces the exiting right C6 nerve roots. Moderate to  advanced right neural foraminal stenosis. Mild spinal canal stenosis.     C6-7: Disc osteophyte complex and asymmetric right uncinate spurring.  Mild spinal canal stenosis. Moderate right and mild left neural  foraminal stenosis.     C7-T1: Asymmetric left disc bulge. No significant spinal canal  stenosis. Mild left neural foraminal stenosis.  See the full report in EPIC/Media tab.  I personally reviewed the images with the patient.      Exam:  *   Well developed, well nourished female found seated comfortably in exam room chair.  Is able to sit and rise independently.    Unaccompanied.    *  Mental Status  A&O X3.  Bright, alert, affable, interactive. Language fluid, fund of knowledge intact. Good historian.  Mood and affect congruent and WNL.   *  Cranial Nerves  II: Able to read printed forms, VF full to gross confrontation.  III: IV, VI:  PERRLA, EOMI, No nystagmus, no ptosis.  V: Sensation intact in bilateral V1, V2, and V3. Jaw clench symmetric.    VII:  Intact to voice bilaterally.  IX:  Pushes tongue against bilateral " cheeks.  X:  Palate elevates, uvula midline, phonation intact.  XI: Elevates shoulders, head turn intact.  XII: Tongue midline. No fasciculations.  *  Cervical Spine:  DTR's at Biceps, Triceps, and Brachioradialis 2/4 and symmetric.  Sensation intact.    No Villareal's. No Phalen's.  No Tinel's. No fasciculations.   Muscle bulk and tone WNL.  Upper Extremity Strength.              RIGHT                LEFT     Deltoid              5/5                   5/5       Biceps              5/5                   5/5        Triceps              5/5                   5/5       Wrist Extensor              5/5                   5/5                     5/5                    5/5       Interossei              5/5                   5/5       EPL              5/5                    5/5       Pinch              5/5                  5/5          *  Lumbar Spine:     DTR's Patellar and Achilles 1/4 and symmetric.   No fasciculations.  Muscle bulk and tone WNL.  No Clonus.  Sensation intact.    Lower Extremity Strength                   RIGHT                   LEFT     Iliopsoas                    5/5                      5/5       Quad                    5/5                        5/5       Hamstring                      5/5                        5/5         Gastrocs                    5/5                        5/5       Tib. Anterior                     5/5                        5/5       EHL                      5/5                        5/5       Babinski                 Down                                      Down                   *  Structural Exam  Inspection of the spine reveals no scoliosis, exaggerated kyphosis or lordosis. Head is balanced over the pelvis in the coronal and sagittal plane.    Cervical spine ROM full. No spasming. No tenderness to palpation.  No Spurling's or L'Hermitte's.   Lumbar ROM full.   Feet are warm.   Gait narrow, smooth, no scissoring. No antalgia.       ASSESSMENT/PLAN:  1. Cervical  radiculopathy      This pleasant violinist has multiple levels of cervical spondylosis causing right arm symptoms , and also possibly an ulnar neuropathy on the left.  I recommend we continue with the nonsurgical therapy, Medrol Dosepak, complete her current round of physical therapy, and get her home traction unit assigned to her home.  She did not pick it up yet because she wanted to check to see if it was really useful.  She is in fact improving clinically.  Her neck pain is better all the arms have not started to respond yet.  I think we can say we're making progress.  I am hesitant to order injections into the cervical spine, for obvious anatomic reasons, especially in a patient who is improving without such invasive techniques.  I will give this another couple of months and see her again; if she has not improved any further, or has started to backslide we will consider injections versus going direct to surgical intervention.  I hesitate to do the latter, but would recommend doing so if she begins to lose function.    I answered all of her questions, which indicated good understanding of the situation. She is willing to move forward with the recommendations. I supplied her with business cards and telephone numbers for ease of contact.   It's been a pleasure participating in the care of this nice patient. We thank you for your confidence in the [facility], Department of Neurosurgery.      Best Regards,    Danica Colunga PA-C  [facility]  Department of Neurosurgery  Phone: [phone]  Fax: [phone]      Total time: 60 minutes with more than 30 minutes spent in direct face to face contact reviewing films, providing education, counseling, non-operative therapies,and indications for surgery as well as further follow up.    This note was generated using voice recognition software. While edited for content some inaccurate phrasing may be found.

## 2018-10-30 ENCOUNTER — THERAPY VISIT (OUTPATIENT)
Dept: PHYSICAL THERAPY | Facility: CLINIC | Age: 58
End: 2018-10-30
Payer: COMMERCIAL

## 2018-10-30 DIAGNOSIS — M54.2 CERVICALGIA: ICD-10-CM

## 2018-10-30 PROCEDURE — 97110 THERAPEUTIC EXERCISES: CPT | Mod: GP | Performed by: PHYSICAL THERAPIST

## 2018-10-30 PROCEDURE — 97012 MECHANICAL TRACTION THERAPY: CPT | Mod: GP | Performed by: PHYSICAL THERAPIST

## 2018-12-14 NOTE — PROGRESS NOTES
Neurosurgery Clinic Follow-up  Date of Visit 12/17/18    Referring Provider:  Sunni Wick MD    Dear Dr. Wick    We were happy to see Romi Cortez, a pleasant 58 year old year old female for right arm pain present for about 9 months.    HPI: This pleasant lady has had neck pain for about 30 years.  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  In the last several months she started noticing numbness in the fingers, all of the fingers in the left hand when she plays, and the lateral 3 fingers in the right hand when she plays, and her neck pain has started to stray into the bilateral trapezii, worse on the right than the left, and now down the right arm time, to the right forearm and all the way to the right wrist.  The right forearm and elbow area is acutely bad, and is the worst of the pain.  The right hand numbness wakes her in the morning.  It does not seem to get any better if she shakes it out.  She does not feel her arms or hands are weak.  There is no change in the arm numbness or pain with movement of her head but there is when she moves her arms.  Specifically her right arm.  No change in bowel or bladder function she has had a good bit of treatment over the years, chiropractic, massage, physical therapy.  None of it is made much difference for any significant time.  She has never had any shots but she has had an MRI done.  This revealed a fair amount of cervical spondylosis and she presents for evaluation and treatment.    At last visit I thought she has cervical spondylosis at C5-6, and C6-7.  C5-6 most likely the worst culprit for her right arm symptoms.  She also has some spondylosis at C7/T1 which may be causing the left hand symptoms, although that also could be ulnar.  I would have to do EMGs to  sort that out.    I recommended we start with a Medrol Dosepak, physical therapy, and a home cervical traction unit.  All of the things helped a little bit, the steroids were most helpful of all.  Her neck feels better than it has in years and her arm is greatly improved.  She is starting to play again although very lightly and slowly.  She is currently not doing her exercises or cervical traction much as their household is a bit disrupted with some repair projects.  She wants to get back into regular exercise routine before moving forward with any further treatments.  2 to    [facility] -  Patient Entered Questionnaire/Answers 12/17/2018   What number best describes your pain right now:  0 = No pain  to  10 = Worst pain imaginable 2   How would you describe the pain? other   Which of the following worsen your pain? nothing worsens the pain   Which of the following improve or reduce your pain?  thinking about something else   What number best describes your average pain for the past week:  0 = No pain  to  10 = Worst pain imaginable 2   What number best describes your LOWEST pain in past 24 hours:  0 = No pain  to  10 = Worst pain imaginable 1   What number best describes your WORST pain in past 24 hours:  0 = No pain  to  10 = Worst pain imaginable 3   When is your pain worst? -   What non-medicine treatments have you already had for your pain? physical therapy, chiropractic care, other   Have you tried treating your pain with medication?  Yes   Are you currently taking medications for your pain? No       Current Outpatient Medications:      ARTIFICIAL TEAR OP, Apply 1 drop to eye as needed Both eyes, 1-2 times a month, Disp: , Rfl:      FLUZONE QUADRIVALENT injection, , Disp: , Rfl:      LamoTRIgine (LAMICTAL PO), Take 200 mg by mouth daily, Disp: , Rfl:      methylPREDNISolone (MEDROL DOSEPAK) 4 MG tablet, Follow package instructions, Disp: 21 tablet, Rfl: 0     methylPREDNISolone (MEDROL DOSEPAK) 4 MG tablet,  "Follow package instructions, Disp: 21 tablet, Rfl: 0     sertraline (ZOLOFT) 100 MG tablet, Take by mouth daily, Disp: , Rfl:      triamcinolone (KENALOG) 0.025 % ointment, Apply topically 2 times daily, Disp: , Rfl:     No Known Allergies      Past medical history, family history, social, and problem list: reviewed in Epic.  A    OBJECTIVE:   /71 (BP Location: Left arm)   Pulse 67   Ht 1.664 m (5' 5.5\")   Wt 94.8 kg (209 lb)   SpO2 97%   BMI 34.25 kg/m      Imaging:  These are the pertinent radiologist's findings from:  MRI 5/14/18 WO @ [facility].   C5-6: Disc osteophyte complex with super post asymmetric right  uncinate spurring, which indents the right ventrolateral aspect of the  cord and displaces the exiting right C6 nerve roots. Moderate to  advanced right neural foraminal stenosis. Mild spinal canal stenosis.     C6-7: Disc osteophyte complex and asymmetric right uncinate spurring.  Mild spinal canal stenosis. Moderate right and mild left neural  foraminal stenosis.     C7-T1: Asymmetric left disc bulge. No significant spinal canal  stenosis. Mild left neural foraminal stenosis.  See the full report in EPIC/Media tab.  I personally reviewed the images with the patient.      Exam:  *   Well developed, well nourished female found seated comfortably in exam room chair.  Is able to sit and rise independently.    Unaccompanied.    *  Mental Status  A&O X3.  Bright, alert, affable, interactive. Language fluid, fund of knowledge intact. Good historian.  Mood and affect congruent and WNL.   *  Cranial Nerves  II: Able to read printed forms, VF full to gross confrontation.  III: IV, VI:  PERRLA, EOMI, No nystagmus, no ptosis.  V: Sensation intact in bilateral V1, V2, and V3. Jaw clench symmetric.    VII:  Intact to voice bilaterally.  IX:  Pushes tongue against bilateral cheeks.  X:  Palate elevates, uvula midline, phonation intact.  XI: Elevates shoulders, head turn intact.  XII: Tongue midline. No " fasciculations.  *  Cervical Spine:  DTR's at Biceps, Triceps, and Brachioradialis 2/4 and symmetric.  Sensation intact.    No Villareal's. No Phalen's.  No Tinel's. No fasciculations.   Muscle bulk and tone WNL.  Upper Extremity Strength.              RIGHT                LEFT     Deltoid              5/5                   5/5       Biceps              5/5                   5/5        Triceps              5/5                   5/5       Wrist Extensor              5/5                   5/5                     5/5                    5/5       Interossei              5/5                   5/5       EPL              5/5                    5/5       Pinch              5/5                  5/5          *  Lumbar Spine:     DTR's Patellar and Achilles 1/4 and symmetric.   No fasciculations.  Muscle bulk and tone WNL.  No Clonus.  Sensation intact.    Lower Extremity Strength                   RIGHT                   LEFT     Iliopsoas                    5/5                      5/5       Quad                    5/5                        5/5       Hamstring                      5/5                        5/5         Gastrocs                    5/5                        5/5       Tib. Anterior                     5/5                        5/5       EHL                      5/5                        5/5       Babinski                 Down                                      Down                   *  Structural Exam  Inspection of the spine reveals no scoliosis, exaggerated kyphosis or lordosis. Head is balanced over the pelvis in the coronal and sagittal plane.    Cervical spine ROM full. No spasming. No tenderness to palpation.  No Spurling's or L'Hermitte's.   Lumbar ROM full.   Feet are warm.   Gait narrow, smooth, no scissoring. No antalgia.       ASSESSMENT/PLAN:  1. Cervical radiculopathy      This pleasant violinist has multiple levels of cervical spondylosis causing right arm symptoms, most likely C5-6,  possibly also C6-7, and also possibly an ulnar neuropathy on the left.   Her symptoms are much improved, arm pain and neck pain.  She is willing to continue with nonsurgical therapy now, and noninvasive therapy.  She does not want injections or surgery at this point.  She wants to get her household settled back down and he is retraction more regularly.  She will call me the end of January and let me know how things are and we will decide how to proceed from there.    I answered all of her questions, which indicated good understanding of the situation. She is willing to move forward with the recommendations. I supplied her with business cards and telephone numbers for ease of contact.   It's been a pleasure participating in the care of this nice patient. We thank you for your confidence in the [facility], Department of Neurosurgery.      Best Regards,    Danica Colunga PA-C  [facility]  Department of Neurosurgery  Phone: [phone]  Fax: [phone]      This note was generated using voice recognition software. While edited for content some inaccurate phrasing may be found.

## 2018-12-17 ENCOUNTER — OFFICE VISIT (OUTPATIENT)
Dept: NEUROSURGERY | Facility: CLINIC | Age: 58
End: 2018-12-17
Payer: COMMERCIAL

## 2018-12-17 VITALS
DIASTOLIC BLOOD PRESSURE: 71 MMHG | WEIGHT: 209 LBS | HEART RATE: 67 BPM | OXYGEN SATURATION: 97 % | BODY MASS INDEX: 33.59 KG/M2 | SYSTOLIC BLOOD PRESSURE: 131 MMHG | HEIGHT: 66 IN

## 2018-12-17 DIAGNOSIS — M54.12 CERVICAL RADICULOPATHY: Primary | ICD-10-CM

## 2018-12-17 ASSESSMENT — ENCOUNTER SYMPTOMS
DECREASED APPETITE: 0
ALTERED TEMPERATURE REGULATION: 1
HOARSE VOICE: 1
SMELL DISTURBANCE: 0
STIFFNESS: 0
ARTHRALGIAS: 0
POOR WOUND HEALING: 0
SORE THROAT: 1
CHILLS: 1
FEVER: 0
NIGHT SWEATS: 1
WEIGHT GAIN: 0
POLYPHAGIA: 0
TROUBLE SWALLOWING: 0
INCREASED ENERGY: 0
FATIGUE: 0
MUSCLE WEAKNESS: 0
MYALGIAS: 1
HALLUCINATIONS: 0
BACK PAIN: 0
SINUS PAIN: 0
MUSCLE CRAMPS: 0
POLYDIPSIA: 0
WEIGHT LOSS: 0
SINUS CONGESTION: 1
NECK MASS: 0
NECK PAIN: 1
TASTE DISTURBANCE: 0
JOINT SWELLING: 0
NAIL CHANGES: 0
SKIN CHANGES: 0

## 2018-12-17 ASSESSMENT — PAIN SCALES - GENERAL: PAINLEVEL: MILD PAIN (2)

## 2018-12-17 ASSESSMENT — MIFFLIN-ST. JEOR: SCORE: 1526.83

## 2018-12-17 NOTE — LETTER
12/17/2018       RE: Romi Cortez  [address] [facility] [address]     Dear Colleague,    Thank you for referring your patient, Romi Cortez, to the [facility] NEUROSURGERY at [facility]. Please see a copy of my visit note below.      Neurosurgery Clinic Follow-up  Date of Visit 12/17/18    Referring Provider:  Sunni Wick MD    Dear Dr. Wick    We were happy to see Romi Cortez, a pleasant 58 year old year old female for right arm pain present for about 9 months.    HPI: This pleasant lady has had neck pain for about 30 years.  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  In the last several months she started noticing numbness in the fingers, all of the fingers in the left hand when she plays, and the lateral 3 fingers in the right hand when she plays, and her neck pain has started to stray into the bilateral trapezii, worse on the right than the left, and now down the right arm time, to the right forearm and all the way to the right wrist.  The right forearm and elbow area is acutely bad, and is the worst of the pain.  The right hand numbness wakes her in the morning.  It does not seem to get any better if she shakes it out.  She does not feel her arms or hands are weak.  There is no change in the arm numbness or pain with movement of her head but there is when she moves her arms.  Specifically her right arm.  No change in bowel or bladder function she has had a good bit of treatment over the years, chiropractic, massage, physical therapy.  None of it is made much difference for any significant time.  She has never had any shots but she has had an MRI done.  This revealed a fair amount of cervical spondylosis and she presents for evaluation and  treatment.    At last visit I thought she has cervical spondylosis at C5-6, and C6-7.  C5-6 most likely the worst culprit for her right arm symptoms.  She also has some spondylosis at C7/T1 which may be causing the left hand symptoms, although that also could be ulnar.  I would have to do EMGs to sort that out.    I recommended we start with a Medrol Dosepak, physical therapy, and a home cervical traction unit.  All of the things helped a little bit, the steroids were most helpful of all.  Her neck feels better than it has in years and her arm is greatly improved.  She is starting to play again although very lightly and slowly.  She is currently not doing her exercises or cervical traction much as their household is a bit disrupted with some repair projects.  She wants to get back into regular exercise routine before moving forward with any further treatments.  2 to    [facility] -  Patient Entered Questionnaire/Answers 12/17/2018   What number best describes your pain right now:  0 = No pain  to  10 = Worst pain imaginable 2   How would you describe the pain? other   Which of the following worsen your pain? nothing worsens the pain   Which of the following improve or reduce your pain?  thinking about something else   What number best describes your average pain for the past week:  0 = No pain  to  10 = Worst pain imaginable 2   What number best describes your LOWEST pain in past 24 hours:  0 = No pain  to  10 = Worst pain imaginable 1   What number best describes your WORST pain in past 24 hours:  0 = No pain  to  10 = Worst pain imaginable 3   When is your pain worst? -   What non-medicine treatments have you already had for your pain? physical therapy, chiropractic care, other   Have you tried treating your pain with medication?  Yes   Are you currently taking medications for your pain? No       Current Outpatient Medications:      ARTIFICIAL TEAR OP, Apply 1 drop to eye as needed Both eyes, 1-2 times a month, Disp:  ", Rfl:      FLUZONE QUADRIVALENT injection, , Disp: , Rfl:      LamoTRIgine (LAMICTAL PO), Take 200 mg by mouth daily, Disp: , Rfl:      methylPREDNISolone (MEDROL DOSEPAK) 4 MG tablet, Follow package instructions, Disp: 21 tablet, Rfl: 0     methylPREDNISolone (MEDROL DOSEPAK) 4 MG tablet, Follow package instructions, Disp: 21 tablet, Rfl: 0     sertraline (ZOLOFT) 100 MG tablet, Take by mouth daily, Disp: , Rfl:      triamcinolone (KENALOG) 0.025 % ointment, Apply topically 2 times daily, Disp: , Rfl:     No Known Allergies      Past medical history, family history, social, and problem list: reviewed in Epic.  A    OBJECTIVE:   /71 (BP Location: Left arm)   Pulse 67   Ht 1.664 m (5' 5.5\")   Wt 94.8 kg (209 lb)   SpO2 97%   BMI 34.25 kg/m       Imaging:  These are the pertinent radiologist's findings from:  MRI 5/14/18 WO @ [facility].   C5-6: Disc osteophyte complex with super post asymmetric right  uncinate spurring, which indents the right ventrolateral aspect of the  cord and displaces the exiting right C6 nerve roots. Moderate to  advanced right neural foraminal stenosis. Mild spinal canal stenosis.     C6-7: Disc osteophyte complex and asymmetric right uncinate spurring.  Mild spinal canal stenosis. Moderate right and mild left neural  foraminal stenosis.     C7-T1: Asymmetric left disc bulge. No significant spinal canal  stenosis. Mild left neural foraminal stenosis.  See the full report in EPIC/Media tab.  I personally reviewed the images with the patient.      Exam:  *   Well developed, well nourished female found seated comfortably in exam room chair.  Is able to sit and rise independently.    Unaccompanied.    *  Mental Status  A&O X3.  Bright, alert, affable, interactive. Language fluid, fund of knowledge intact. Good historian.  Mood and affect congruent and WNL.   *  Cranial Nerves  II: Able to read printed forms, VF full to gross confrontation.  III: IV, VI:  PERRLA, EOMI, No nystagmus, no " ptosis.  V: Sensation intact in bilateral V1, V2, and V3. Jaw clench symmetric.    VII:  Intact to voice bilaterally.  IX:  Pushes tongue against bilateral cheeks.  X:  Palate elevates, uvula midline, phonation intact.  XI: Elevates shoulders, head turn intact.  XII: Tongue midline. No fasciculations.  *  Cervical Spine:  DTR's at Biceps, Triceps, and Brachioradialis 2/4 and symmetric.  Sensation intact.    No Villareal's. No Phalen's.  No Tinel's. No fasciculations.   Muscle bulk and tone WNL.  Upper Extremity Strength.              RIGHT                LEFT     Deltoid              5/5                   5/5       Biceps              5/5                   5/5        Triceps              5/5                   5/5       Wrist Extensor              5/5                   5/5                     5/5                    5/5       Interossei              5/5                   5/5       EPL              5/5                    5/5       Pinch              5/5                  5/5          *  Lumbar Spine:     DTR's Patellar and Achilles 1/4 and symmetric.   No fasciculations.  Muscle bulk and tone WNL.  No Clonus.  Sensation intact.    Lower Extremity Strength                   RIGHT                   LEFT     Iliopsoas                    5/5                      5/5       Quad                    5/5                        5/5       Hamstring                      5/5                        5/5         Gastrocs                    5/5                        5/5       Tib. Anterior                     5/5                        5/5       EHL                      5/5                        5/5       Babinski                 Down                                      Down                   *  Structural Exam  Inspection of the spine reveals no scoliosis, exaggerated kyphosis or lordosis. Head is balanced over the pelvis in the coronal and sagittal plane.    Cervical spine ROM full. No spasming. No tenderness to palpation.  No  Spurling's or L'Hermitte's.   Lumbar ROM full.   Feet are warm.   Gait narrow, smooth, no scissoring. No antalgia.       ASSESSMENT/PLAN:  1. Cervical radiculopathy      This pleasant violinist has multiple levels of cervical spondylosis causing right arm symptoms, most likely C5-6, possibly also C6-7, and also possibly an ulnar neuropathy on the left.   Her symptoms are much improved, arm pain and neck pain.  She is willing to continue with nonsurgical therapy now, and noninvasive therapy.  She does not want injections or surgery at this point.  She wants to get her household settled back down and he is retraction more regularly.  She will call me the end of January and let me know how things are and we will decide how to proceed from there.    I answered all of her questions, which indicated good understanding of the situation. She is willing to move forward with the recommendations. I supplied her with business cards and telephone numbers for ease of contact.   It's been a pleasure participating in the care of this nice patient. We thank you for your confidence in the [facility], Department of Neurosurgery.      Best Regards,    Danica Colunga PA-C  [facility]  Department of Neurosurgery  Phone: [phone]  Fax: [phone]      This note was generated using voice recognition software. While edited for content some inaccurate phrasing may be found.

## 2019-01-20 ENCOUNTER — DOCUMENTATION ONLY (OUTPATIENT)
Dept: SLEEP MEDICINE | Facility: CLINIC | Age: 59
End: 2019-01-20

## 2019-01-20 NOTE — Clinical Note
Pt to follow up via VV or f2f please.  Severe randy, treating with MAD, and positional device.Thanks,WM

## 2019-01-20 NOTE — PROGRESS NOTES
Note from Dr. Malone, pt seen for MAD fabrication.  Plan was also to consider positional device.  Will ask pt to follow up in clinic.    JACK Esqueda, CNP-BC  Sleep Medicine

## 2019-01-22 ENCOUNTER — TELEPHONE (OUTPATIENT)
Dept: SLEEP MEDICINE | Facility: CLINIC | Age: 59
End: 2019-01-22

## 2019-01-22 NOTE — TELEPHONE ENCOUNTER
Spoke with the patient on 1/22/2019 at 11:19 AM and scheduled her follow up per providers request.    Manda Ojeda [facility] ~[place]

## 2019-01-22 NOTE — TELEPHONE ENCOUNTER
----- Message from JACK Allen CNP sent at 1/20/2019  8:56 AM CST -----  Pt to follow up via VV or f2f please.  Severe randy, treating with MAD, and positional device.    Thanks,  WM

## 2019-02-25 ENCOUNTER — OFFICE VISIT (OUTPATIENT)
Dept: SLEEP MEDICINE | Facility: CLINIC | Age: 59
End: 2019-02-25
Payer: COMMERCIAL

## 2019-02-25 VITALS
DIASTOLIC BLOOD PRESSURE: 81 MMHG | RESPIRATION RATE: 18 BRPM | HEIGHT: 66 IN | HEART RATE: 72 BPM | WEIGHT: 216 LBS | BODY MASS INDEX: 34.72 KG/M2 | SYSTOLIC BLOOD PRESSURE: 128 MMHG | OXYGEN SATURATION: 96 %

## 2019-02-25 DIAGNOSIS — G47.33 OSA (OBSTRUCTIVE SLEEP APNEA): Primary | ICD-10-CM

## 2019-02-25 PROCEDURE — 99214 OFFICE O/P EST MOD 30 MIN: CPT | Performed by: NURSE PRACTITIONER

## 2019-02-25 ASSESSMENT — MIFFLIN-ST. JEOR: SCORE: 1553.58

## 2019-02-25 NOTE — PATIENT INSTRUCTIONS
"MY TREATMENT INFORMATION FOR SLEEP APNEA-  Romi Cortez    NP : Fiona Yen  SLEEP CENTER :      MY CONTACT NUMBER:     [facility], follow up with me  Talk with [facility] re: L ear pain  Girish allen  Am I having a sleep study at a sleep center?  Make sure you have an appointment for the study before you leave!    Am I having a home sleep study?  Watch this video:  [email or web address].Appsco.com/watch?v=CteI_[email or web address]&[email or web address]  Please verify your insurance coverage with your insurance carrier    Frequently asked questions:  1. What is Obstructive Sleep Apnea (ALE)? ALE is the most common type of sleep apnea. Apnea means, \"without breath.\"  Apnea is most often caused by narrowing or collapse of the upper airway as muscles relax during sleep.   Almost everyone has occasional apneas. Most people with sleep apnea have had brief interruptions at night frequently for many years.  The severity of sleep apnea is related to how frequent and severe the events are.   2. What are the consequences of ALE? Symptoms include: feeling sleepy during the day, snoring loudly, gasping or stopping of breathing, trouble sleeping, and occasionally morning headaches or heartburn at night.  Sleepiness can be serious and even increase the risk of falling asleep while driving. Other health consequences may include development of high blood pressure and other cardiovascular disease in persons who are susceptible. Untreated ALE  can contribute to heart disease, stroke and diabetes.   3. What are the treatment options? In most situations, sleep apnea is a lifelong disease that must be managed with daily therapy. Medications are not effective for sleep apnea and surgery is generally not considered until other therapies have been tried. Your treatment is your choice . Continuous Positive Airway (CPAP) works right away and is the therapy that is effective in nearly everyone. An oral device to hold your jaw " forward is usually the next most reliable option. Other options include postioning devices (to keep you off your back), weight loss, and surgery including a tongue pacing device. There is more detail about some of these options below.    Important tips for using CPAP and similar devices   Know your equipment:  CPAP is continuous positive airway pressure that prevents obstructive sleep apnea by keeping the throat from collapsing while you are sleeping. In most cases, the device is  smart  and can slowly self-adjusts if your throat collapses and keeps a record every day of how well you are treated-this information is available to you and your care team.  BPAP is bilevel positive airway pressure that keeps your throat open and also assists each breath with a pressure boost to maintain adequate breathing.  Special kinds of BPAP are used in patients who have inadequate breathing from lung or heart disease. In most cases, the device is  smart  and can slowly self-adjusts to assist breathing. Like CPAP, the device keeps a record of how well you are treated.  Your mask is your connection to the device. You get to choose what feels most comfortable and the staff will help to make sure if fits. Here: are some examples of the different masks that are available:       Key points to remember on your journey with sleep apnea:  1. Sleep study.  PAP devices often need to be adjusted during a sleep study to show that they are effective and adjusted right.  2. Good tips to remember: Try wearing just the mask during a quiet time during the day so your body adapts to wearing it. A humidifier is recommended for comfort in most cases to prevent drying of your nose and throat. Allergy medication from your provider may help you if you are having nasal congestion.  3. Getting settled-in. It takes more than one night for most of us to get used to wearing a mask. Try wearing just the mask during a quiet time during the day so your body adapts  to wearing it. A humidifier is recommended for comfort in most cases. Our team will work with you carefully on the first day and will be in contact within 4 days and again at 2 and 4 weeks for advice and remote device adjustments. Your therapy is evaluated by the device each day.   4. Use it every night. The more you are able to sleep naturally for 7-8 hours, the more likely you will have good sleep and to prevent health risks or symptoms from sleep apnea. Even if you use it 4 hours it helps. Occasionally all of us are unable to use a medical therapy, in sleep apnea, it is not dangerous to miss one night.   5. Communicate. Call our skilled team on the number provided on the first day if your visit for problems that make it difficult to wear the device. Over 2 out of 3 patients can learn to wear the device long-term with help from our team. Remember to call our team or your sleep providers if you are unable to wear the device as we may have other solutions for those who cannot adapt to mask CPAP therapy. It is recommended that you sleep your sleep provider within the first 3 months and yearly after that if you are not having problems.   Take care of your equipment. Make sure you clean your mask and tubing using directions every day and that your filter and mask are replaced as recommended or if they are not working.     BESIDES CPAP, WHAT OTHER THERAPIES ARE THERE?    Positioning Device  Positioning devices are generally used when sleep apnea is mild and only occurs on your back.This example shows a pillow that straps around the waist. It may be appropriate for those whose sleep study shows milder sleep apnea that occurs primarily when lying flat on one's back. Preliminary studies have shown benefit but effectiveness at home may need to be verified by a home sleep test. These devices are generally not covered by medical insurance.  Examples of devices that maintain sleeping on the back to prevent snoring and mild  sleep apnea.    Belt type body positioner  Http://Cycle.com/    Electronic reminder  Http://nightshifttherapy.com/  Http://www.[employer].com.au/    Oral Appliance  What is oral appliance therapy?  An oral appliance device fits on your teeth at night like a retainer used after having braces. The device is made by a specialized dentist and requires several visits over 1-2 months before a manufactured device is made to fit your teeth and is adjusted to prevent your sleep apnea. Once an oral device is working properly, snoring should be improved. A home sleep test may be recommended at that time if to determine whether the sleep apnea is adequately treated.       Some things to remember:  -Oral devices are often, but not always, covered by your medical insurance. Be sure to check with your insurance provider.   -If you are referred for oral therapy, you will be given a list of specialized dentists to consider or you may choose to visit the Web site of the American Academy of Dental Sleep Medicine  -Oral devices are less likely to work if you have severe sleep apnea or are extremely overweight.     More detailed information  An oral appliance is a small acrylic device that fits over the upper and lower teeth  (similar to a retainer or a mouth guard). This device slightly moves jaw forward, which moves the base of the tongue forward, opens the airway, improves breathing for effective treat snoring and obstructive sleep apnea in perhaps 7 out of 10 people .  The best working devices are custom-made by a dental device  after a mold is made of the teeth 1, 2, 3.  When is an oral appliance indicated?  Oral appliance therapy is recommended as a first-line treatment for patients with primary snoring, mild sleep apnea, and for patients with moderate sleep apnea who prefer appliance therapy to use of CPAP4, 5. Severity of sleep apnea is determined by sleep testing and is based on the number of respiratory events per  hour of sleep.   How successful is oral appliance therapy?  The success rate of oral appliance therapy in patients with mild sleep apnea is 75-80% while in patients with moderate sleep apnea it is 50-70%. The chance of success in patients with severe sleep apnea is 40-50%. The research also shows that oral appliances have a beneficial effect on the cardiovascular health of ALE patients at the same magnitude as CPAP therapy7.  Oral appliances should be a second-line treatment in cases of severe sleep apnea, but if not completely successful then a combination therapy utilizing CPAP plus oral appliance therapy may be effective. Oral appliances tend to be effective in a broad range of patients although studies show that the patients who have the highest success are females, younger patients, those with milder disease, and less severe obesity. 3, 6.   Finding a dentist that practices dental sleep medicine  Specific training is available through the American Academy of Dental Sleep Medicine for dentists interested in working in the field of sleep. To find a dentist who is educated in the field of sleep and the use of oral appliances, near you, visit the Web site of the American Academy of Dental Sleep Medicine.    References  1. Debi et al. Objectively measured vs self-reported compliance during oral appliance therapy for sleep-disordered breathing. Chest 2013; 144(5): 2864-7688.  2. Jerry et al. Objective measurement of compliance during oral appliance therapy for sleep-disordered breathing. Thorax 2013; 68(1): 91-96.  3. Arnol, et al. Mandibular advancement devices in 620 men and women with ALE and snoring: tolerability and predictors of treatment success. Chest 2004; 125: 2713-5870.  4. Patrice, et al. Oral appliances for snoring and ALE: a review. Sleep 2006; 29: 244-262.  5. Kasi, et al. Oral appliance treatment for ALE: an update. J Clin Sleep Med 2014; 10(2): 215-227.  6. Clare et al.  Predictors of OSAH treatment outcome. J Dent Res 2007; 86: 6354-6936.      Weight Loss:    Weight loss is a long-term strategy that may improve sleep apnea in some patients.    Weight management is a personal decision and the decision should be based on your interest and the potential benefits.  If you are interested in exploring weight loss strategies, the following discussion covers the impact on weight loss on sleep apnea and the approaches that may be successful.    Being overweight does not necessarily mean you will have health consequences.  Those who have BMI over 35 or over 27 with existing medical conditions carries greater risk.   Weight loss decreases severity of sleep apnea in most people with obesity. For those with mild obesity who have developed snoring with weight gain, even 15-30 pound weight loss can improve and occasionally eliminate sleep apnea.  Structured and life-long dietary and health habits are necessary to lose weight and keep healthier weight levels.     Though there may be significant health benefits from weight loss, long-term weight loss is very difficult to achieve- studies show success with dietary management in less than 10% of people. In addition, substantial weight loss may require years of dietary control and may be difficult if patients have severe obesity. In these cases, surgical management may be considered.  Finally, older individuals who have tolerated obesity without health complications may be less likely to benefit from weight loss strategies.      [unfilled]    Surgery:    Surgery for obstructive sleep apnea is considered generally only when other therapies fail to work. Surgery may be discussed with you if you are having a difficult time tolerating CPAP and or when there is an abnormal structure that requires surgical correction.  Nose and throat surgeries often enlarge the airway to prevent collapse.  Most of these surgeries create pain for 1-2 weeks and up to half of  the most common surgeries are not effective throughout life.  You should carefully discuss the benefits and drawbacks to surgery with your sleep provider and surgeon to determine if it is the best solution for you.   More information  Surgery for ALE is directed at areas that are responsible for narrowing or complete obstruction of the airway during sleep.  There are a wide range of procedures available to enlarge and/or stabilize the airway to prevent blockage of breathing in the three major areas where it can occur: the palate, tongue, and nasal regions.  Successful surgical treatment depends on the accurate identification of the factors responsible for obstructive sleep apnea in each person.  A personalized approach is required because there is no single treatment that works well for everyone.  Because of anatomic variation, consultation with an examination by a sleep surgeon is a critical first step in determining what surgical options are best for each patient.  In some cases, examination during sedation may be recommended in order to guide the selection of procedures.  Patients will be counseled about risks and benefits as well as the typical recovery course after surgery. Surgery is typically not a cure for a person s ALE.  However, surgery will often significantly improve one s ALE severity (termed  success rate ).  Even in the absence of a cure, surgery will decrease the cardiovascular risk associated with OSA7; improve overall quality of life8 (sleepiness, functionality, sleep quality, etc).      Palate Procedures:  Patients with ALE often have narrowing of their airway in the region of their tonsils and uvula.  The goals of palate procedures are to widen the airway in this region as well as to help the tissues resist collapse.  Modern palate procedure techniques focus on tissue conservation and soft tissue rearrangement, rather than tissue removal.  Often the uvula is preserved in this procedure. Residual  sleep apnea is common in patient after pharyngoplasty with an average reduction in sleep apnea events of 33%2.      Tongue Procedures:  ExamWhile patients are awake, the muscles that surround the throat are active and keep this region open for breathing. These muscles relax during sleep, allowing the tongue and other structures to collapse and block breathing.  There are several different tongue procedures available.  Selection of a tongue base procedure depends on characteristics seen on physical exam.  Generally, procedures are aimed at removing bulky tissues in this area or preventing the back of the tongue from falling back during sleep.  Success rates for tongue surgery range from 50-62%3.    Hypoglossal Nerve Stimulation:  Hypoglossal nerve stimulation has recently received approval from the [place] Food and Drug Administration for the treatment of obstructive sleep apnea.  This is based on research showing that the system was safe and effective in treating sleep apnea6.  Results showed that the median AHI score decreased 68%, from 29.3 to 9.0. This therapy uses an implant system that senses breathing patterns and delivers mild stimulation to airway muscles, which keeps the airway open during sleep.  The system consists of three fully implanted components: a small generator (similar in size to a pacemaker), a breathing sensor, and a stimulation lead.  Using a small handheld remote, a patient turns the therapy on before bed and off upon awakening.    Candidates for this device must be greater than 22 years of age, have moderate to severe ALE (AHI between 20-65), BMI less than 32, have tried CPAP/oral appliance without success, and have appropriate upper airway anatomy (determined by a sleep endoscopy performed by Dr. Rivera).    Hypoglossal Nerve Stimulation Pathway:    The sleep surgeon s office will work with the patient through the insurance prior-authorization process (including communications and  appeals).    Nasal Procedures:  Nasal obstruction can interfere with nasal breathing during the day and night.  Studies have shown that relief of nasal obstruction can improve the ability of some patients to tolerate positive airway pressure therapy for obstructive sleep apnea1.  Treatment options include medications such as nasal saline, topical corticosteroid and antihistamine sprays, and oral medications such as antihistamines or decongestants. Non-surgical treatments can include external nasal dilators for selected patients. If these are not successful by themselves, surgery can improve the nasal airway either alone or in combination with these other options.      Combination Procedures:  Combination of surgical procedures and other treatments may be recommended, particularly if patients have more than one area of narrowing or persistent positional disease.  The success rate of combination surgery ranges from 66-80%2,3.    References  1. Fahad BALBUENA. The Role of the Nose in Snoring and Obstructive Sleep Apnoea: An Update.  Eur Arch Otorhinolaryngol. 2011; 268: 1365-73.  2.  Philly SM; Louis JA; Elsi JR; Pallanch JF; Shira MB; Melina SG; Marilyn BRODERICK. Surgical modifications of the upper airway for obstructive sleep apnea in adults: a systematic review and meta-analysis. SLEEP 2010;33(10):9802-9396. Edgardo ARRIOLA. Hypopharyngeal surgery in obstructive sleep apnea: an evidence-based medicine review.  Arch Otolaryngol Head Neck Surg. 2006 Feb;132(2):206-13.  3. Lon YH1, Norma Y, Isaak MARY. The efficacy of anatomically based multilevel surgery for obstructive sleep apnea. Otolaryngol Head Neck Surg. 2003 Oct;129(4):327-35.  4. Edgardo ARRIOLA, Goldberg A. Hypopharyngeal Surgery in Obstructive Sleep Apnea: An Evidence-Based Medicine Review. Arch Otolaryngol Head Neck Surg. 2006 Feb;132(2):206-13.  5. Katelynn ACEVEDO et al. Upper-Airway Stimulation for Obstructive Sleep Apnea.  N Engl J Med. 2014 Jan 9;370(2):139-49.  6. Miguel SUGGS  et al. Increased Incidence of Cardiovascular Disease in Middle-aged Men with Obstructive Sleep Apnea. Am J Respir Crit Care Med; 2002 166: 159-165  7. Terell STANTON et al. Studying Life Effects and Effectiveness of Palatopharyngoplasty (SLEEP) study: Subjective Outcomes of Isolated Uvulopalatopharyngoplasty. Otolaryngol Head Neck Surg. 2011; 144: 623-631.

## 2019-02-25 NOTE — PROGRESS NOTES
CC: pt returns to clinic to discuss PSG and develop a plan of care    HPI: Reports a 3-year history of trying weight loss to improve her quality of sleep.  She feels if there is no treatment that she might be at increased risk for dementia.  Family members report snoring, uncertain of the actual volume.  She does awaken with a snort or gasping arousal, has pauses in her breathing and trouble breathing through her nose.  Sleeps primarily on her left side and occasionally on her back.  All symptoms are worse on her back.  No signs of orexin deficiency, infrequent RLS    Admits to being a night owl.  Earliest bedtime is probably 11:30 to enter bed, otherwise between 12:00 and 1:00 p.m. is her routine.  Exits bed as early as 7:00 a.m. on work days, .  Most common time to wake up on workdays is between 8:30 and 9:00 and 9:00 a.m. on weekends.  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    Sleep comorbities....DSPD,    Comorbidity: vertigo, neck and R arm pain with hand numbness, bipolar II with depression and anxiety, ADD    PS18 AHI 40.4, REM AHI 82.9, supine AHI 40.4 RERA index was 39.2, RDI 79.6, snoring light to moderate, time with 02 sat < =88% was 2.0 minutes.  Resolution of obstructive events with Cpap at 5cm h20 with development of central events and persistent sleep disruption in the supine position.  During tx: plms 26.1, PLM AI 5.3.    Treatment options discussed: pt wishes to consider a dental appliance and modified positional therapy (not on R shoulder).    Allergies:    No Known Allergies    Medications:    Current Outpatient Medications   Medication Sig Dispense Refill     ARTIFICIAL TEAR OP Apply 1 drop to eye as needed Both eyes, 1-2 times a month       FLUZONE QUADRIVALENT injection        LamoTRIgine (LAMICTAL PO) Take 200 mg by mouth daily       methylPREDNISolone (MEDROL DOSEPAK) 4 MG tablet Follow package instructions 21 tablet 0      methylPREDNISolone (MEDROL DOSEPAK) 4 MG tablet Follow package instructions 21 tablet 0     sertraline (ZOLOFT) 100 MG tablet Take by mouth daily       triamcinolone (KENALOG) 0.025 % ointment Apply topically 2 times daily         Problem List:  Patient Active Problem List    Diagnosis Date Noted     Cervicalgia 09/07/2018     Priority: Medium     Injury of forearm muscle or tendon, unspecified laterality, subsequent encounter 08/01/2016     Priority: Medium     Dermatitis, seborrheic 09/15/2015     Priority: Medium     AK (actinic keratosis) 09/15/2015     Priority: Medium     Senile sebaceous gland hyperplasia 09/15/2015     Priority: Medium     Lentigines 09/15/2015     Priority: Medium     Appendicitis 11/21/2013     Priority: Medium        Past Medical/Surgical History:  No past medical history on file.  Past Surgical History:   Procedure Laterality Date     D & C  1996     LAPAROSCOPIC APPENDECTOMY  11/21/2013    Procedure: LAPAROSCOPIC APPENDECTOMY;  Laparoscopic Appendectomy;  Surgeon: Sung Rodriguez MD;  Location: [facility] OR           Physical Examination:  Vitals: There were no vitals taken for this visit.  BMI= There is no height or weight on file to calculate BMI.    [place] Total Score 8/13/2018   Total score - [place] 4     GENERAL APPEARANCE: healthy, alert and no distress     Assessment/plan:   1. Wishing to consider a dental appliance for randy, along with positional therapy.  Goal is to reduce apneic burden (could not see use of cpap) Pt is a fiddle player, undergoing pt.  Positional therapy to be used to stay off of R side.  Follow up with me in 3-4 months, will determine if ready for an HST.  2. Obesity: wt loss may impact severity   3. Circadian rhythm: DSPD: recommend rise time similar throughout the week    Time spent with patient is 25 minutes, of which >50% was spent in counseling, education and coordination of care.       Cc: Returns here today in follow up for dental referral and  positional device for severe ALE.    HPI: PS18 AHI 40.4, REM AHI 82.9, supine AHI 40.4 RERA index was 39.2, RDI 79.6, snoring light to moderate, time with 02 sat < =88% was 2.0 minutes.  Resolution of obstructive events with Cpap at 5cm h20 with development of central events and persistent sleep disruption in the supine position.  During tx: plms 26.1, PLM AI 5.3.    Treatment options discussed dental appliance: Wear q night, [place] Cranial Facial: Wally Rossi:     Sleep Review of Systems:     Snoring, snort arousals, gasping while on therapy: no     Bedpartner c/o's of snoring x1: Y    Teeth in allignment: yes uses a.m. positioner     Insufficient sleep, devoting <7 or more hours to sleep: try for 8,      Problems falling or staying asleep with: no    L ear pain, am or with chewing: Has not talked to shaila yet about this    wakeups 2x noc: Seems normal for her    Response to therapy: ESS ,     Equipment issues: Left ear discomfort      Allergies:    No Known Allergies    Medications:    Current Outpatient Medications   Medication Sig Dispense Refill     ARTIFICIAL TEAR OP Apply 1 drop to eye as needed Both eyes, 1-2 times a month       LamoTRIgine (LAMICTAL PO) Take 200 mg by mouth daily       methylPREDNISolone (MEDROL DOSEPAK) 4 MG tablet Follow package instructions 21 tablet 0     sertraline (ZOLOFT) 100 MG tablet Take by mouth daily       triamcinolone (KENALOG) 0.025 % ointment Apply topically 2 times daily         Problem List:  Patient Active Problem List    Diagnosis Date Noted     Cervicalgia 2018     Priority: Medium     Injury of forearm muscle or tendon, unspecified laterality, subsequent encounter 2016     Priority: Medium     Dermatitis, seborrheic 09/15/2015     Priority: Medium     AK (actinic keratosis) 09/15/2015     Priority: Medium     Senile sebaceous gland hyperplasia 09/15/2015     Priority: Medium     Lentigines 09/15/2015     Priority: Medium     Appendicitis  "11/21/2013     Priority: Medium        Past Medical/Surgical History:  No past medical history on file.  Past Surgical History:   Procedure Laterality Date     D & C  1996     LAPAROSCOPIC APPENDECTOMY  11/21/2013    Procedure: LAPAROSCOPIC APPENDECTOMY;  Laparoscopic Appendectomy;  Surgeon: Sung Rodriguez MD;  Location: [facility] OR     Physical Examination:  Vitals: /81   Pulse 72   Resp 18   Ht 1.664 m (5' 5.5\")   Wt 98 kg (216 lb)   SpO2 96%   BMI 35.40 kg/m    BMI= Body mass index is 35.4 kg/m .    [place] Total Score 2/25/2019   Total score - [place] 2     GENERAL APPEARANCE: healthy, alert and no distress    A/P: Severe obstructive sleep apnea with respiratory events and central events while sleeping supine.  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  Positional therapy is not an option at this point in time.  Has had significant reduction in symptoms while using her MAD.      1. ALE: better symptomatically, will review results with an HST and will update Lan Collins and Chidi.  Did discuss use of did very do and reminded her of name of expert in the community.  Patient willing to have HST to evaluate success of MAD in the setting of severe obstructive sleep apnea.  She is encouraged to update franklin Collins and Prieto haile with regard to left ear pain.    2. Obesity: continue to work this will help reduce severity of ALE    Time spent with patient 25 minutes of which greater than 50% was spent in counseling education and coordination of care end of dictation          Time spent with patient is 25 minutes, of which >50% was spent in counseling, education and coordination of care.          "

## 2019-02-26 NOTE — PROGRESS NOTES
Pt's insurance will not cover hst.      Call to pt, pt does not want to pay for HST. Updated Krys Casarez re: cancellation.  Would consider a psg again.  Discussed logic-if we can do something to make things better if there has not been significant reduction of randy such as positional (can sleep on R side) or raise HOB I think there is a reason to recheck.  She agreed, but symptomatically, has noted much improvement.    Will discuss with her sleep dentist, Dr. Rossi or Wally.      Pharmacy for [facility]: [place].    Discussed with Dr. Lo, would like to consider a titration study if pt's ear/jaw discomfort could be managed without adjustment on their end.  She will contact Romi and update her on this plan and be in touch with me on next step: restudy and use position, vs restudy and titrate.    Will update Romi that Dr. Lo will be in touch.

## 2019-02-27 ENCOUNTER — DOCUMENTATION ONLY (OUTPATIENT)
Dept: CARE COORDINATION | Facility: CLINIC | Age: 59
End: 2019-02-27

## 2019-03-04 ENCOUNTER — TRANSFERRED RECORDS (OUTPATIENT)
Dept: HEALTH INFORMATION MANAGEMENT | Facility: CLINIC | Age: 59
End: 2019-03-04

## 2019-03-17 ENCOUNTER — DOCUMENTATION ONLY (OUTPATIENT)
Dept: SLEEP MEDICINE | Facility: CLINIC | Age: 59
End: 2019-03-17

## 2019-03-17 DIAGNOSIS — G47.33 OSA (OBSTRUCTIVE SLEEP APNEA): Primary | ICD-10-CM

## 2019-03-17 NOTE — PROGRESS NOTES
Documentation from Dr. Lo, Pt has instructions and adjustments have been made for pt to be restudied for effectiveness of MAD in setting of severe ALE.  Pt does not wish to pay for HST, has [facility] [place] [facility].  Will place orders (study and follow up) and ask for pt to be called with the following questions:    1. How did the treatments Dr. Lo recommend help with Left ear pain?  Are you ready to schedule restudy to see how effective the MAD is.   2. Where would you like ambien to go?  3. If there is still significant residual apnea with restudy with MAD, will raise HOB towards end of study (R shoulder is painful) or encourage L sided sleep to see effect.    7/6/18 PSG: Severe obstructive sleep apnea without hypoxemia.    Events ? The polysomnogram revealed a presence of 62 obstructive, 4 central, and - mixed apneas resulting in an apnea index of 26.7 events per hour. There were 34 obstructive hypopneas and - central hypopneas resulting in an obstructive hypopnea index of 13.7 and central hypopnea index of - events per hour. The combined apnea/hypopnea index was 40.4 events per hour (central apnea/hypopnea index was 1.6 events per hour).  The REM AHI was 82.9 events per hour. The supine AHI was 40.4 events per hour. The RERA index was 39.2 events per hour. The RDI was 79.6 events per hour.    Thank you

## 2019-03-17 NOTE — Clinical Note
See my note: needs psg for evaluation of effectiveness of MAD.  Let me know where she want ambien!  Thanks

## 2019-03-18 ENCOUNTER — TELEPHONE (OUTPATIENT)
Dept: SLEEP MEDICINE | Facility: CLINIC | Age: 59
End: 2019-03-18

## 2019-03-18 NOTE — TELEPHONE ENCOUNTER
Reason for call:  Other   Patient called regarding (reason for call): appointment  Additional comments: Patient stated that she was informed someone from the clinic was going to call her to set up a telephone visit. She has not received a call. She was also informed by someone that only the clinic staff can schedule telephone visits. Tried to reach the clinic but call kept getting a busy dial tone. Patient is set up for an in office visit for 04/01/19 for now in case there are no telephone visits available. Please call to discuss if she can be set up for a telephone visit.    Phone number to reach patient:  Cell number on file:    Telephone Information:   Mobile [phone]       Best Time:  any    Can we leave a detailed message on this number?  YES     Shelby OVIEDO  Central Scheduler

## 2019-03-19 NOTE — TELEPHONE ENCOUNTER
Called and left vm for patient to please return call to schedule overnight sleep study. Also need to know which pharmacy patient would like her Ambien to be sent.    Portillo Hall  Sleep Clinic Specialist - [place]

## 2019-03-22 NOTE — TELEPHONE ENCOUNTER
Called and left vm for patient to please return call to schedule overnight sleep study. Also need to know which pharmacy patient would like her Ambien to be sent.     Portillo Hall  Sleep Clinic Specialist - [place]

## 2019-03-26 NOTE — TELEPHONE ENCOUNTER
Called and unable to leave  due to mailbox being full.    Portillo Hall  Sleep Clinic Specialist - [place]

## 2019-03-27 NOTE — TELEPHONE ENCOUNTER
Patient is scheduled for overnight sleep study April 5. Patient wants Ambien sent to [place], [place] - [address] [facility]. Msg has been sent to Fiona Yen.    Portillo Hall  Sleep Clinic Specialist - [place]

## 2019-03-28 RX ORDER — ZOLPIDEM TARTRATE 5 MG/1
5 TABLET ORAL
Qty: 1 TABLET | Refills: 0 | Status: SHIPPED | OUTPATIENT
Start: 2019-03-28 | End: 2023-02-16

## 2019-04-01 ENCOUNTER — VIRTUAL VISIT (OUTPATIENT)
Dept: SLEEP MEDICINE | Facility: CLINIC | Age: 59
End: 2019-04-01
Payer: COMMERCIAL

## 2019-04-01 DIAGNOSIS — G47.33 OSA (OBSTRUCTIVE SLEEP APNEA): Primary | ICD-10-CM

## 2019-04-01 PROCEDURE — 98967 PH1 ASSMT&MGMT NQHP 11-20: CPT | Performed by: NURSE PRACTITIONER

## 2019-04-01 NOTE — PROGRESS NOTES
Chief complaint: I have been asked to speak with Romi edgar regarding her concerns as to the goal of the scheduled polysomnogram    History of present illness: Reports a 3-year history of trying weight loss to improve her quality of sleep.  She feels if there is no treatment that she might be at increased risk for dementia.  Family members report snoring, uncertain of the actual volume.  She does awaken with a snort or gasping arousal, has pauses in her breathing and trouble breathing through her nose.  Sleeps primarily on her left side and occasionally on her back.  All symptoms are worse on her back.  Sleep comorbidities: Delayed sleep phase disorder, infrequent RLS.  Comorbidities include right arm and neck pain with hand numbness, bipolar 2 with depression and anxiety, ADD.       PS18 AHI 40.4, REM AHI 82.9, supine AHI 40.4 RERA index was 39.2, RDI 79.6, snoring light to moderate, time with 02 sat < =88% was 2.0 minutes.  Resolution of obstructive events with Cpap at 5cm h20 with development of central events and persistent sleep disruption in the supine position.  During tx: plms 26.1, PLM AI 5.3.    19 virtual visit: Patient has concerns that the efforts to try to see if there can be an improvement in reduction in snoring prior to her polysomnogram have not been very scientific.  She was given #19 bands, down from #20s.  She has discussed how other testing options have occurred to evaluate the effectiveness of an MAD and she does not want to go to the study without having further information on how to improve with snoring and sleep apnea.  She has spoken to other people and they have had with a call a titration study.  She did get a sleep apnea that does show a fair amount of snoring and it is rated at the same volume as her baseline study done on 2018.    Assessment plan: It is my impression that Romi does want to come out of this test with an effective treatment.  Consideration of a  titration study may be more reasonable.  I had previously opposed the question that if there was significant residual apnea with the recent study which she like to see if it has resolved with raising the head of the bed or encouraged to sleep on her left side.  She wishes a more definitive titration study ordered by her sleep dentist.    1.  Obstructive sleep apnea moderate disease, worse supine with significant amount of rare arousals with an RDI of 79.6.  Her snoring At home seems to indicate a fair amount of snoring still persisting despite the new bands.  Call out to Dr. Mancuso to see if there is any changes that she wishes to make to the orders for her Thursday study.  Will contact patient back between the hours of 11 and 1215.    This was a scheduled telephone call of 20-minutes in length as a virtual visit

## 2019-04-05 ENCOUNTER — THERAPY VISIT (OUTPATIENT)
Dept: SLEEP MEDICINE | Facility: CLINIC | Age: 59
End: 2019-04-05
Payer: COMMERCIAL

## 2019-04-05 ENCOUNTER — DOCUMENTATION ONLY (OUTPATIENT)
Dept: SLEEP MEDICINE | Facility: CLINIC | Age: 59
End: 2019-04-05

## 2019-04-05 DIAGNOSIS — G47.33 OSA (OBSTRUCTIVE SLEEP APNEA): ICD-10-CM

## 2019-04-05 PROCEDURE — 95810 POLYSOM 6/> YRS 4/> PARAM: CPT | Performed by: INTERNAL MEDICINE

## 2019-04-05 NOTE — Clinical Note
Yann Jaimes, PSG report has been completed.  Patient does not have a follow-up appointment to review the test results with you.  Please let Yady know if you would like to set up a follow-up visit with the patient.Thanks, David

## 2019-04-09 ENCOUNTER — OFFICE VISIT (OUTPATIENT)
Dept: DERMATOLOGY | Facility: CLINIC | Age: 59
End: 2019-04-09
Payer: COMMERCIAL

## 2019-04-09 DIAGNOSIS — L81.4 LENTIGINES: ICD-10-CM

## 2019-04-09 DIAGNOSIS — D48.5 NEOPLASM OF UNCERTAIN BEHAVIOR OF SKIN: ICD-10-CM

## 2019-04-09 DIAGNOSIS — Z80.8 FAMILY HISTORY OF MELANOMA: Primary | ICD-10-CM

## 2019-04-09 ASSESSMENT — PAIN SCALES - GENERAL
PAINLEVEL: NO PAIN (0)
PAINLEVEL: NO PAIN (0)

## 2019-04-09 NOTE — PATIENT INSTRUCTIONS

## 2019-04-09 NOTE — PROGRESS NOTES
[facility] Dermatology Note      Dermatology Problem List:  1. AKs s/p cryo  2. Seborrheic dermatitis  - ketoconazole 2% shampoo  3. Eczema - hands  - triamcinolone 0.1% ointment  4. Skin cancer screening 3/14/18, 4/9/2019  5. NUB on the Left preauricular. DDx includes BCC vs sebaceous hyperplasia vs other 4/9/19     Encounter Date: Apr 9, 2019    CC:  Chief Complaint   Patient presents with     Skin Check     Romi is here today for a skin check- Romi notes no areas of concern.          History of Present Illness:  Ms. Romi Cortez is a 59 year old female who presents as a follow-up for FBSE. She has a hx of AKs which were previously tx with cryo - one on the nose and one on the left cheek. The patient was last seen 03/14/2018 when she was prescribed triamcinolone 0.1% ointment for eczema. States this is currently well controlled. Patient does have a family history of melanoma (brother). Today the patient has no areas of concern. She would like to get educated on what she should be looking for in regards of skin cancer. The patient denies painful, itching, tingling or bleeding lesions unless otherwise noted.      Past Medical History:   Patient Active Problem List   Diagnosis     Appendicitis     Dermatitis, seborrheic     AK (actinic keratosis)     Senile sebaceous gland hyperplasia     Lentigines     Injury of forearm muscle or tendon, unspecified laterality, subsequent encounter     Cervicalgia     No past medical history on file.  Past Surgical History:   Procedure Laterality Date     D & C  1996     LAPAROSCOPIC APPENDECTOMY  11/21/2013    Procedure: LAPAROSCOPIC APPENDECTOMY;  Laparoscopic Appendectomy;  Surgeon: Sung Rodriguez MD;  Location:  OR       Social History:   reports that she has never smoked. She has never used smokeless tobacco. She reports that she does not drink alcohol or use drugs.    Family History:  Family History   Problem Relation Age of Onset      Cancer Mother         skin cancer     Glaucoma Mother      Skin Cancer Mother      Melanoma Brother        Medications:  Current Outpatient Medications   Medication Sig Dispense Refill     ARTIFICIAL TEAR OP Apply 1 drop to eye as needed Both eyes, 1-2 times a month       LamoTRIgine (LAMICTAL PO) Take 200 mg by mouth daily       methylPREDNISolone (MEDROL DOSEPAK) 4 MG tablet Follow package instructions 21 tablet 0     sertraline (ZOLOFT) 100 MG tablet Take by mouth daily       triamcinolone (KENALOG) 0.025 % ointment Apply topically 2 times daily       zolpidem (AMBIEN) 5 MG tablet Take 1 tablet (5 mg) by mouth nightly as needed for sleep 1 tablet 0       No Known Allergies    Review of Systems:  -Constitutional: The patient denies fatigue, fevers, chills, unintended weight loss, and night sweats.  -HEENT: Patient denies nonhealing oral sores.  -Skin: As above in HPI. No additional skin concerns.  -Heme/Lymph: no concerning bumps, no bleeding or bruising problems     Physical exam:  Vitals: There were no vitals taken for this visit.  GEN: This is a well developed, well-nourished female in no acute distress, in a pleasant mood.    SKIN: Full skin, which includes the head/face, both arms, chest, back, abdomen,both legs, genitalia and/or groin buttocks, digits and/or nails, was examined.  -Aguirre's skin type I, less than 100 nevi - overall relatively unremarkable skin exam  -Left preauricular 4 mm pink papule with a central depression  -Scattered brown macules on the distal forearm.  -No other lesions of concern on areas examined.     Impression/Plan:  1. Family history of melanoma in the patient's brother    ABCDs of melanoma were discussed and self skin checks were advised.    Sunscreen: Apply 20 minutes prior to going outdoors and reapply every two hours, when wet or sweating. We recommend using an SPF 30 or higher, and to use one that is water resistant.       Recommended yearly skin examination due to  positive family history melanoma    2. Neoplasm of uncertain behavior on the Left preauricular. The differential diagnosis includes BCC vs sebaceous hyperplasia vs other.     Shave biopsy:  After discussion of benefits and risks including but not limited to bleeding/bruising, pain/swelling, infection, scar, incomplete removal, nerve damage/numbness, recurrence, and non-diagnostic biopsy, written consent, verbal consent and photographs were obtained. Time-out was performed. The area was cleaned with isopropyl alcohol. 0.5ml of 1% lidocaine with 1:100,000 epinephrine was injected to obtain adequate anesthesia. A shave biopsy was performed. Hemostasis was achieved with aluminium chloride. Vaseline and a sterile dressing were applied. The patient tolerated the procedure and no complications were noted. The patient was provided with verbal and written post care instructions.    Photograph was obtained for clinical monitoring and inclusion in medical record.      3. Solar lentigines- distal forearms    Reassured benign, no further intervention required. Patient to report changes.     CC Dr. Hoang on close of this encounter.  Follow-up in 1 year, earlier for new or changing lesions.       Staff Involved:  Staff/Scribe    Scribe Disclosure:  IJose, am serving as a scribe to document services personally performed by Nimisha Caban PA-C, based on data collection and the provider's statements to me.     Provider Disclosure:   The documentation recorded by the scribe accurately reflects the services I personally performed and the decisions made by me.    All risks, benefits and alternatives were discussed with patient.  Patient is in agreement and understands the assessment and plan.  All questions were answered.    Nimisha Caban PA-C  [facility] [place]: Phone: [phone], Fax: [phone]

## 2019-04-09 NOTE — NURSING NOTE
Lidocaine-epinephrine 1-1:632333 % injection   1mL once for one use, starting 4/9/2019 ending 4/9/2019,  2mL disp, R-0, injection  Injected by ANGELA Castañeda

## 2019-04-09 NOTE — NURSING NOTE
Dermatology Rooming Note    Romi Cortez's goals for this visit include:   Chief Complaint   Patient presents with     Skin Check     Romi is here today for a skin check- Romi notes no areas of concern.      ANGELA Castañeda

## 2019-04-09 NOTE — LETTER
4/9/2019       RE: Romi Cortez  [address] [facility] [address]     Dear Colleague,    Thank you for referring your patient, Romi Cortez, to the [facility] DERMATOLOGY at [facility]. Please see a copy of my visit note below.    [facility] Dermatology Note      Dermatology Problem List:  1. AKs s/p cryo  2. Seborrheic dermatitis  - ketoconazole 2% shampoo  3. Eczema - hands  - triamcinolone 0.1% ointment  4. Skin cancer screening 3/14/18, 4/9/2019  5. NUB on the Left preauricular. DDx includes BCC vs sebaceous hyperplasia vs other 4/9/19     Encounter Date: Apr 9, 2019    CC:  Chief Complaint   Patient presents with     Skin Check     Romi is here today for a skin check- Romi notes no areas of concern.          History of Present Illness:  Ms. Romi Cortez is a 59 year old female who presents as a follow-up for FBSE. She has a hx of AKs which were previously tx with cryo - one on the nose and one on the left cheek. The patient was last seen 03/14/2018 when she was prescribed triamcinolone 0.1% ointment for eczema. States this is currently well controlled. Patient does have a family history of melanoma (brother). Today the patient has no areas of concern. She would like to get educated on what she should be looking for in regards of skin cancer. The patient denies painful, itching, tingling or bleeding lesions unless otherwise noted.      Past Medical History:   Patient Active Problem List   Diagnosis     Appendicitis     Dermatitis, seborrheic     AK (actinic keratosis)     Senile sebaceous gland hyperplasia     Lentigines     Injury of forearm muscle or tendon, unspecified laterality, subsequent encounter     Cervicalgia     No past medical history on file.  Past Surgical History:   Procedure Laterality Date     D & C  1996     LAPAROSCOPIC APPENDECTOMY  11/21/2013    Procedure: LAPAROSCOPIC APPENDECTOMY;  Laparoscopic Appendectomy;   Surgeon: Sung Rodriguez MD;  Location:  OR       Social History:   reports that she has never smoked. She has never used smokeless tobacco. She reports that she does not drink alcohol or use drugs.    Family History:  Family History   Problem Relation Age of Onset     Cancer Mother         skin cancer     Glaucoma Mother      Skin Cancer Mother      Melanoma Brother        Medications:  Current Outpatient Medications   Medication Sig Dispense Refill     ARTIFICIAL TEAR OP Apply 1 drop to eye as needed Both eyes, 1-2 times a month       LamoTRIgine (LAMICTAL PO) Take 200 mg by mouth daily       methylPREDNISolone (MEDROL DOSEPAK) 4 MG tablet Follow package instructions 21 tablet 0     sertraline (ZOLOFT) 100 MG tablet Take by mouth daily       triamcinolone (KENALOG) 0.025 % ointment Apply topically 2 times daily       zolpidem (AMBIEN) 5 MG tablet Take 1 tablet (5 mg) by mouth nightly as needed for sleep 1 tablet 0       No Known Allergies    Review of Systems:  -Constitutional: The patient denies fatigue, fevers, chills, unintended weight loss, and night sweats.  -HEENT: Patient denies nonhealing oral sores.  -Skin: As above in HPI. No additional skin concerns.  -Heme/Lymph: no concerning bumps, no bleeding or bruising problems     Physical exam:  Vitals: There were no vitals taken for this visit.  GEN: This is a well developed, well-nourished female in no acute distress, in a pleasant mood.    SKIN: Full skin, which includes the head/face, both arms, chest, back, abdomen,both legs, genitalia and/or groin buttocks, digits and/or nails, was examined.  -Aguirre's skin type I, less than 100 nevi - overall relatively unremarkable skin exam  -Left preauricular 4 mm pink papule with a central depression  -Scattered brown macules on the distal forearm.  -No other lesions of concern on areas examined.     Impression/Plan:  1. Family history of melanoma in the patient's brother    ABCDs of melanoma were  discussed and self skin checks were advised.    Sunscreen: Apply 20 minutes prior to going outdoors and reapply every two hours, when wet or sweating. We recommend using an SPF 30 or higher, and to use one that is water resistant.       Recommended yearly skin examination due to positive family history melanoma    2. Neoplasm of uncertain behavior on the Left preauricular. The differential diagnosis includes BCC vs sebaceous hyperplasia vs other.     Shave biopsy:  After discussion of benefits and risks including but not limited to bleeding/bruising, pain/swelling, infection, scar, incomplete removal, nerve damage/numbness, recurrence, and non-diagnostic biopsy, written consent, verbal consent and photographs were obtained. Time-out was performed. The area was cleaned with isopropyl alcohol. 0.5ml of 1% lidocaine with 1:100,000 epinephrine was injected to obtain adequate anesthesia. A shave biopsy was performed. Hemostasis was achieved with aluminium chloride. Vaseline and a sterile dressing were applied. The patient tolerated the procedure and no complications were noted. The patient was provided with verbal and written post care instructions.    Photograph was obtained for clinical monitoring and inclusion in medical record.      3. Solar lentigines- distal forearms    Reassured benign, no further intervention required. Patient to report changes.     CC Dr. Hoang on close of this encounter.  Follow-up in 1 year, earlier for new or changing lesions.       Staff Involved:  Staff/Scribe    Scribe Disclosure:  BLAS, Jose Chappell, am serving as a scribe to document services personally performed by Nimisha Caban PA-C, based on data collection and the provider's statements to me.     Provider Disclosure:   The documentation recorded by the scribe accurately reflects the services I personally performed and the decisions made by me.    All risks, benefits and alternatives were discussed with patient.  Patient is in  agreement and understands the assessment and plan.  All questions were answered.                  Pictures were placed in Pt's chart today for future reference.      Again, thank you for allowing me to participate in the care of your patient.      Sincerely,    Nimisha Caban PA-C

## 2019-04-10 LAB — COPATH REPORT: NORMAL

## 2019-04-10 NOTE — PROCEDURES
" SLEEP STUDY INTERPRETATION  ORAL APPLIANCE TITRATION STUDY      Patient: LYUDMILA RAPHAEL  YOB: 1960  Study Date: 4/5/2019  MRN: [id number]  Referring Provider: MICHAEL Lo D.D.S., M.S.  Ordering Provider: GIACOMO Brandt CNP    Indications for Polysomnography: The patient is a 59 y old Female who is 5' 5\" and weighs 216.0 lbs. Her BMI is 35.6, [place] sleepiness scale 2.0 and neck circumference is 37.0 cm. She was previously diagnosed with severe obstructive sleep apnea and is currently being treated with dental appliance.  PSG obtained on 7/6/18 showed: AHI 40.4, REM AHI 82.9, supine AHI 40.4, RDI 79.6, snoring light to moderate, time with 02 sat < =88% was 2.0 minutes. A polysomnogram with dental appliance was performed to check the effectiveness of the dental appliance for the treatment of ALE.    Polysomnogram Data: A full night polysomnogram recorded the standard physiologic parameters including EEG, EOG, EMG, ECG, nasal and oral airflow. Respiratory parameters of chest and abdominal movements were recorded with respiratory inductance plethysmography. Oxygen saturation was recorded by pulse oximetry. Hypopnea scoring rule used: 1B 4%.    Treatment PSG  Sleep Architecture: mild sleep fragmentation but normal sleep efficiency with all sleep stages were observed   The total recording time of the polysomnogram was 434.4 minutes. The total sleep time was 376.0 minutes. Sleep latency was normal at 14.6 minutes with the use of a sleep aid (zolpidem 5mg). REM latency was 105.5 minutes. Arousal index was mildly increased at 20.6 arousals per hour. Sleep efficiency was normal at 86.6%. Wake after sleep onset was 43.5 minutes. The patient spent 6.0% of total sleep time in Stage N1, 43.9% in Stage N2, 29.0% in Stage N3, and 21.1% in REM. Time in REM supine was 55.0 minutes.    Respiration: persistent snoring and moderate obstructive sleep apnea, despite the use of dental appliance. Obstructive events " were pronounced during sleep in supine position and during REM sleep, particularly more pronounced during REM sleep in supine position.    The polysomnogram was obtained with dental appliance with 2 sets of adjustments with a residual AHI of 28.6 events per hour. Time in REM supine on final pressure was 55.0 minutes.     Snoring - was reported as mild to moderate.    Respiratory rate and pattern - was notable for normal respiratory rate and pattern.    Sustained Sleep Associated Hypoventilation - Transcutaneous carbon dioxide monitoring was not used, however significant hypoventilation was not suggested by oximetry.    Sleep Associated Hypoxemia - (Greater than 5 minutes O2 sat at or below 88%) was not present. Baseline oxygen saturation was 93.4%. Lowest oxygen saturation was 80.3%. Time spent less than or equal to 88% was 3.1 minutes. Time spent less than or equal to 89% was 5.6 minutes.    Movement Activity: frequent periodic limb movements were observed, however they were not associated with significant arousals.  Periodic Limb Activity - There were 104 PLMs during the entire study. The PLM index was 16.6 movements per hour. The PLM Arousal Index was 2.2 per hour.    REM EMG Activity - mild intermittent increase in transient muscle activity was  present in few REM epochs.    Nocturnal Behavior - Abnormal sleep related behaviors were not noted during/arising out of NREM / REM sleep.    Bruxism - None apparent.    Cardiac Summary: normal sinus rhythm was noted   The average pulse rate was 64.9 bpm. The minimum pulse rate was 56.9 bpm while the maximum pulse rate was 93.1 bpm. Arrhythmias were not noted.      Assessment:     There was some improvement in the overall severity of the ALE with the dental appliance compared to the polysomnogram obtained in 2018. However, persistent snoring and moderate obstructive sleep apnea were present despite the use of dental appliance. Obstructive events were pronounced during  sleep in supine position and during REM sleep, particularly more pronounced during REM sleep in supine position.    Sleep architecture was remarkable for mild sleep fragmentation but normal sleep efficiency. All sleep stages were observed.    Frequent periodic limb movements were observed, however they were not associated with significant arousals.    Abnormal sleep related behaviors were not noted.    Normal sinus rhythm was noted.    Recommendations:    Suggest follow up with sleep dentistry to check for feasibility for further advancement of  the dental appliance, in conjunction with positional therapy during sleep using tennis ball T shirt/U shaped body pillow or FDA approved zzoma pillow or night shift sleep positioner.    Alternate treatment of ALE includes empiric treatment with auto-titrating PAP therapy with a range of 5-15 cmH2O and clinical follow up with sleep management team, including review of compliance measures.    Advice regarding the risks of drowsy driving.    Suggest optimizing sleep schedule and avoiding sleep deprivation.    Weight management (if BMI > 30).    Pharmacologic therapy should be used for management of restless legs syndrome only if present and clinically indicated and not based on the presence of periodic limb movements alone.    Diagnostic Codes:   Obstructive Sleep Apnea G47.33  Periodic Limb Movement Disorder G47.61  Repetitive Intrusions Into Sleep F51.8    4/5/2019 [place] Titration Sleep Study (216.0 lbs) - titration with dental appliance with 2 sets of adjustments during the study with an AHI of 28.6. Time Spent in REM supine at this pressure was 55.0.     _____________________________________   Electronically Signed By: (Stefania Cook MD), 4/10/2019

## 2019-04-11 LAB — SLPCOMP: NORMAL

## 2019-04-16 ENCOUNTER — VIRTUAL VISIT (OUTPATIENT)
Dept: SLEEP MEDICINE | Facility: CLINIC | Age: 59
End: 2019-04-16
Payer: COMMERCIAL

## 2019-04-16 DIAGNOSIS — G47.33 OSA (OBSTRUCTIVE SLEEP APNEA): Primary | ICD-10-CM

## 2019-04-16 DIAGNOSIS — E66.812 CLASS 2 OBESITY WITH BODY MASS INDEX (BMI) OF 35.0 TO 35.9 IN ADULT, UNSPECIFIED OBESITY TYPE, UNSPECIFIED WHETHER SERIOUS COMORBIDITY PRESENT: ICD-10-CM

## 2019-04-16 PROCEDURE — 98967 PH1 ASSMT&MGMT NQHP 11-20: CPT | Performed by: NURSE PRACTITIONER

## 2019-04-16 NOTE — PATIENT INSTRUCTIONS
Continue with efforts on weight loss,    Develop a more reliable positional device,    Could consider a surgical intervention or a trial of CPAP.    Follow-up 6 months    Copy to patient and Dr. Shukla

## 2019-06-07 ENCOUNTER — THERAPY VISIT (OUTPATIENT)
Dept: PHYSICAL THERAPY | Facility: CLINIC | Age: 59
End: 2019-06-07
Payer: COMMERCIAL

## 2019-06-07 DIAGNOSIS — M54.2 CERVICALGIA: ICD-10-CM

## 2019-06-07 PROCEDURE — 97112 NEUROMUSCULAR REEDUCATION: CPT | Mod: GP | Performed by: PHYSICAL THERAPIST

## 2019-06-07 PROCEDURE — 97110 THERAPEUTIC EXERCISES: CPT | Mod: GP | Performed by: PHYSICAL THERAPIST

## 2019-06-10 ENCOUNTER — TRANSFERRED RECORDS (OUTPATIENT)
Dept: HEALTH INFORMATION MANAGEMENT | Facility: CLINIC | Age: 59
End: 2019-06-10

## 2019-06-12 NOTE — PROGRESS NOTES
PROGRESS  REPORT    Progress reporting period is from 10/10/18 to 6/7/19. Pt returns to PT after completing HEP independently and using home cervical traction unit. Pt reports doing okay, but has not been completing exercise or using the traction often over the past couple months and would like to revisit her exercise routine. Pt continues to have R sided cervical and scapular pain with intermittent radiation into her R arm which limits her with repetitive UE activities.     SUBJECTIVE  Subjective: Pt returns to PT to discuss an exercise routine in addition to her cervical traction treatment for her recurrent cervical pain with R sided UE radiation. Pain overall has improved in her arm, but pt continues to have c/o R sided cervical and scapular pain with repetitive UE activities.     Current Pain level: 3/10.     Initial Pain level: 4/10.   Changes in function:  Yes (See Goal flowsheet attached for changes in current functional level)  Adverse reaction to treatment or activity: None    OBJECTIVE  Changes noted in objective findings:  Yes,   Painful loss of cervical rotation to the right remains  Painfree Shoulder AROM in all directions  Easily fatigeud w/ scapular exercises and poor deep neck flexor strength.       ASSESSMENT/PLAN  Updated problem list and treatment plan: Diagnosis 1:  R cervical radiculopathy  Pain -  mechanical traction, manual therapy, splint/taping/bracing/orthotics, self management and education  Decreased ROM/flexibility - manual therapy and therapeutic exercise  Decreased strength - therapeutic exercise and therapeutic activities  Impaired muscle performance - neuro re-education  Impaired posture - neuro re-education  STG/LTGs have been met or progress has been made towards goals:  Yes (See Goal flow sheet completed today.)  Assessment of Progress: The patient's condition is improving.  Self Management Plans:  Patient has been instructed in a home treatment program.  I have re-evaluated this  patient and find that the nature, scope, duration and intensity of the therapy is appropriate for the medical condition of the patient.  Romi continues to require the following intervention to meet STG and LTG's:  PT    Recommendations:  This patient would benefit from continued therapy.     Frequency:  1 X week, once daily  Duration:  for 4 weeks tapering to 2 X a month over 8 weeks        Please refer to the daily flowsheet for treatment today, total treatment time and time spent performing 1:1 timed codes.

## 2019-06-20 ENCOUNTER — DOCUMENTATION ONLY (OUTPATIENT)
Dept: SLEEP MEDICINE | Facility: CLINIC | Age: 59
End: 2019-06-20

## 2019-06-20 NOTE — PROGRESS NOTES
Note from Dr. Chu, BLANK re: tolerance to MAD and plan of care.  Reviewed chart, no follow up made with me from virtual visit of 4/16/19.  Wish to see in October.    WM

## 2019-06-26 ENCOUNTER — TELEPHONE (OUTPATIENT)
Dept: SLEEP MEDICINE | Facility: CLINIC | Age: 59
End: 2019-06-26

## 2019-06-26 NOTE — TELEPHONE ENCOUNTER
Pt called and message left informing pt that dental notes have been rev'd and pt should call clinic to schedule HST to test using dental device to test it's effectiveness.      Copy of notes has been given to Fiona to review and copy sent to scanning.    ANGELA Garcia

## 2019-07-16 ENCOUNTER — THERAPY VISIT (OUTPATIENT)
Dept: PHYSICAL THERAPY | Facility: CLINIC | Age: 59
End: 2019-07-16
Payer: COMMERCIAL

## 2019-07-16 DIAGNOSIS — M54.2 CERVICALGIA: ICD-10-CM

## 2019-07-16 PROCEDURE — 97110 THERAPEUTIC EXERCISES: CPT | Mod: GP | Performed by: PHYSICAL THERAPIST

## 2019-07-16 PROCEDURE — 97112 NEUROMUSCULAR REEDUCATION: CPT | Mod: GP | Performed by: PHYSICAL THERAPIST

## 2019-07-30 ENCOUNTER — THERAPY VISIT (OUTPATIENT)
Dept: PHYSICAL THERAPY | Facility: CLINIC | Age: 59
End: 2019-07-30
Payer: COMMERCIAL

## 2019-07-30 DIAGNOSIS — M54.2 CERVICALGIA: ICD-10-CM

## 2019-07-30 PROCEDURE — 97112 NEUROMUSCULAR REEDUCATION: CPT | Mod: GP | Performed by: PHYSICAL THERAPIST

## 2019-07-30 PROCEDURE — 97140 MANUAL THERAPY 1/> REGIONS: CPT | Mod: GP | Performed by: PHYSICAL THERAPIST

## 2019-07-30 PROCEDURE — 97110 THERAPEUTIC EXERCISES: CPT | Mod: GP | Performed by: PHYSICAL THERAPIST

## 2019-08-16 ENCOUNTER — THERAPY VISIT (OUTPATIENT)
Dept: PHYSICAL THERAPY | Facility: CLINIC | Age: 59
End: 2019-08-16
Payer: COMMERCIAL

## 2019-08-16 DIAGNOSIS — M54.2 CERVICALGIA: ICD-10-CM

## 2019-08-16 PROCEDURE — 97140 MANUAL THERAPY 1/> REGIONS: CPT | Mod: GP | Performed by: PHYSICAL THERAPIST

## 2019-08-16 PROCEDURE — 97110 THERAPEUTIC EXERCISES: CPT | Mod: GP | Performed by: PHYSICAL THERAPIST

## 2019-08-16 PROCEDURE — 97112 NEUROMUSCULAR REEDUCATION: CPT | Mod: GP | Performed by: PHYSICAL THERAPIST

## 2019-08-21 NOTE — PROGRESS NOTES
Discharge Note    Progress reporting period is from last progress note on   to Aug 16, 2019.    Romi failed to follow up and current status is unknown.  Please see information below for last relevant information on current status.  Patient seen for 12 visits.    SUBJECTIVE  Subjective changes noted by patient:  Pt reports doing much better overall. Was able to play guitar this past week w/ only mild soreness after. Still gets tingling in her hand/arm but very short lived. PLeased with her progress  .  Current pain level is 2/10.     Previous pain level was  4/10.   Changes in function:  Yes (See Goal flowsheet attached for changes in current functional level)  Adverse reaction to treatment or activity: None    OBJECTIVE  Changes noted in objective findings: TTP at R UT, mild overactivation w/ scap stabilization. Tolerated exercises well      ASSESSMENT/PLAN  Diagnosis: Cervical radiculopathy   Updated problem list and treatment plan:  Pain -  mechanical traction, manual therapy, splint/taping/bracing/orthotics, self management and education  Decreased ROM/flexibility - manual therapy and therapeutic exercise  Decreased strength - therapeutic exercise and therapeutic activities  Impaired muscle performance - neuro re-education  Impaired posture - neuro re-education   STG/LTGs have been met or progress has been made towards goals:  Yes, please see goal flowsheet for most current information  Assessment of Progress: current status is unknown.    Last current status: Pt is progressing well   Self Management Plans:  HEP  I have re-evaluated this patient and find that the nature, scope, duration and intensity of the therapy is appropriate for the medical condition of the patient.  Romi continues to require the following intervention to meet STG and LTG's:  HEP.    Recommendations:  Discharge with current home program.  Patient to follow up with MD as needed.    Please refer to the daily flowsheet for treatment today,  total treatment time and time spent performing 1:1 timed codes.

## 2019-08-29 ENCOUNTER — THERAPY VISIT (OUTPATIENT)
Dept: PHYSICAL THERAPY | Facility: CLINIC | Age: 59
End: 2019-08-29
Payer: COMMERCIAL

## 2019-08-29 DIAGNOSIS — M54.2 CERVICALGIA: ICD-10-CM

## 2019-08-29 PROCEDURE — 97112 NEUROMUSCULAR REEDUCATION: CPT | Mod: GP | Performed by: PHYSICAL THERAPIST

## 2019-08-29 PROCEDURE — 97140 MANUAL THERAPY 1/> REGIONS: CPT | Mod: GP | Performed by: PHYSICAL THERAPIST

## 2019-08-29 PROCEDURE — 97110 THERAPEUTIC EXERCISES: CPT | Mod: GP | Performed by: PHYSICAL THERAPIST

## 2019-09-26 ENCOUNTER — THERAPY VISIT (OUTPATIENT)
Dept: PHYSICAL THERAPY | Facility: CLINIC | Age: 59
End: 2019-09-26
Payer: COMMERCIAL

## 2019-09-26 DIAGNOSIS — M54.2 CERVICALGIA: ICD-10-CM

## 2019-09-26 PROCEDURE — 97110 THERAPEUTIC EXERCISES: CPT | Mod: GP | Performed by: PHYSICAL THERAPIST

## 2019-09-26 PROCEDURE — 97112 NEUROMUSCULAR REEDUCATION: CPT | Mod: GP | Performed by: PHYSICAL THERAPIST

## 2019-10-02 ENCOUNTER — HEALTH MAINTENANCE LETTER (OUTPATIENT)
Age: 59
End: 2019-10-02

## 2020-08-04 NOTE — PROGRESS NOTES
CC: Discuss results of oral appliance titration study    History of present illness:Reports a 3-year history of trying weight loss to improve her quality of sleep.  She feels if there is no treatment that she might be at increased risk for dementia.  Family members report snoring, uncertain of the actual volume.  She does awaken with a snort or gasping arousal, has pauses in her breathing and trouble breathing through her nose.  Sleeps primarily on her left side and occasionally on her back.  All symptoms are worse on her back.  No signs of orexin deficiency, infrequent RLS. Comorbidities: R sided neck and shoulder discomfort-doing PT, sleep comorbidity: DSPD-nightowl.    4/5/19 PSG: First part of study which included band for dental appliance which is the smaller straps did show a total sleep time of 183 minutes and AHI of 6.6 and an arousal index of 22.6.  This part of the study did contain a fair amount of slow-wave sleep which appears to be somewhat protective for Romi she did have REM sleep with the shorter straps but was on her left side and did not have many respiratory events.  Arousal index was 22.6.  Part 2 of the study which did occur with a longer straps was 193.0 minutes her AHI for this part of the study was 28.6 most of these events did occur during REM supine sleep.  Arousal index with longer strap was 16.5.  O2 sats on average were fine.    We discussed treatment options again which could include a mandibular advancement device with addition of a surgical option, low CPAP, a positional device to guarantee left-sided sleep, and weight loss.  She is currently working on weight loss and does report that on most occasions does try to keep a pillow behind her back but is aware that if it is not attached to her that it gets left behind.  I have asked her to perhaps try to do some positional therapy with even less shorter strap and see if there is an improvement in her quality of sleep.  She does have  Patient contacted to review echo results. Pt verbalizes understanding and all questions answered at this time.        Patient states he has been on dialysis starting about 3 weeks ago. He currently is going every day per the wife.     snoring lab on her cell phone and she could record herself with her first dental band lying lateral throughout the night, and her second dental band lying lateral throughout the night and see if she notices a difference.  Her bed partner is not aware of difference in snoring.  Snoring was reported as mild to moderate on this study.    Assessment plan: A mixed bag of results with regard to her titration study likely due to the fact that randomly in the first part of her study did occur on her left side, in on her second side with a longer strap did occur while supine.  And the following plan of care has been developed  1.  Obstructive sleep apnea less severe in intensity with a residual AHI of 28.6 with a second set of adjustment.  Strong impact from positional therapy and sleep staging.  I will see her back in 5-6 months.  In the meantime she is to discuss this study with Dr. Shukla with whom I will share a copy of this note and a copy of the study.  She is welcome to return anytime sooner as indicated.  2.  Obesity: BMI 35.47 she is currently on a diet and she is hoping to lose some weight she knows that this may make a difference with regard to her apnea as well.    Thank you for allowing me to participate in this kind woman's care this visit was a 15-minute scheduled virtual visit done by a scheduled phone call.    CC: Jada Lo along with copy of SERINA NEWBY

## 2021-01-15 ENCOUNTER — HEALTH MAINTENANCE LETTER (OUTPATIENT)
Age: 61
End: 2021-01-15

## 2021-03-21 ENCOUNTER — HEALTH MAINTENANCE LETTER (OUTPATIENT)
Age: 61
End: 2021-03-21

## 2021-04-02 ENCOUNTER — TRANSFERRED RECORDS (OUTPATIENT)
Dept: PHYSICAL THERAPY | Facility: CLINIC | Age: 61
End: 2021-04-02

## 2021-04-06 ENCOUNTER — TRANSCRIBE ORDERS (OUTPATIENT)
Dept: OTHER | Age: 61
End: 2021-04-06

## 2021-04-06 DIAGNOSIS — Z00.00 ENCOUNTER FOR ROUTINE ADULT HEALTH EXAMINATION WITHOUT ABNORMAL FINDINGS: Primary | ICD-10-CM

## 2021-04-23 ENCOUNTER — OFFICE VISIT (OUTPATIENT)
Dept: PHYSICAL THERAPY | Facility: CLINIC | Age: 61
End: 2021-04-23
Payer: COMMERCIAL

## 2021-04-23 DIAGNOSIS — M54.2 CERVICALGIA: Primary | ICD-10-CM

## 2021-04-23 DIAGNOSIS — M54.12 CERVICAL RADICULOPATHY: ICD-10-CM

## 2021-04-23 PROCEDURE — 97110 THERAPEUTIC EXERCISES: CPT | Mod: GP | Performed by: PHYSICAL THERAPIST

## 2021-04-23 PROCEDURE — 97161 PT EVAL LOW COMPLEX 20 MIN: CPT | Mod: GP | Performed by: PHYSICAL THERAPIST

## 2021-04-23 NOTE — LETTER
[facility]  [address] [place] [address]  [phone]    2021    Re: Romi Cortez   :   1960  MRN:  [id number]   REFERRING PHYSICIAN:   Mayra [facility]  Date of Initial Evaluation:  21  Visits:  Rxs Used: 1  Reason for Referral:     Cervicalgia  Cervical radiculopathy    EVALUATION SUMMARY    Physical Therapy Initial Evaluation  Subjective:  Therapist Generated HPI Evaluation  Problem details: Pt presents to PT with a chief complaint of chronic cervical radiculopathy with recent worsening of hand numbness (~2021). Pt reports her radicular symptoms have become more constant lately and pt hoping to return to her previous HEP and traction (previously helpful). Cervical pain worse with prolonged UE activity (playing instruments, lifting/carrying). .         Type of problem:  Cervical spine.  This is a chronic condition.  Condition occurred with:  Degenerative joint disease.  Where condition occurred: for unknown reasons.  Patient reports pain:  Cervical right side.  Pain is described as sharp and shooting and is intermittent (constant numbness/tingling).  Pain radiates to:  Shoulder right, upper arm right, lower arm right and hand right. Pain is worse in the P.M..  Since onset symptoms are gradually worsening.  Associated symptoms:  Loss of motion/stiffness, loss of strength, numbness and tingling. Symptoms are exacerbated by carrying, lifting, rotating head and looking up or down  and relieved by rest and NSAID's.  Special tests included:  X-ray and MRI.  Restrictions due to condition include:  Working in normal job without restrictions.  Barriers include:  None as reported by patient.    Patient Health History  Pain is reported as 6/10 on pain scale.  General health as reported by patient is good.  Pertinent medical history includes: depression, menopausal and  numbness/tingling.   Red flags:  None as reported by patient.  Medical allergies: none.   Surgeries include:  None.    Current medications:  Anti-depressants and anti-seizure medication.    Occupation: Musician    Primary job tasks include:  Computer work and driving.   Re: Romi Cortez   :   1960    Objective:  Standing Alignment:    Cervical/Thoracic:  Forward head       Cervical/Thoracic Evaluation  AROM:  AROM Cervical:  Flexion:          Min loss, pain +  Extension:       Mod loss, pain +  Rotation:         Left: Min loss     Right: Mod loss  Side Bend:      Left:     Right:   Headaches: cervical  Cervical Myotomes:  normal  Neural Tension:    Right side:  Median positive.  Cervical Dermatomes:    C6 right:  Hypo-light touch    C7 right:  Hypo-light touch    Spinal Segmental Conclusions:    Level:  Hypo at C7 and C6    Shoulder Evaluation:  ROM:  Strength:    Abduction:  Left: 4+/5  Pain:    Right: 4+/5     Pain:  External Rotation:   Left:4+/5     Pain:   Right:4+/5     Pain:      Assessment/Plan:    Patient is a 61 year old female with cervical complaints.    Patient has the following significant findings with corresponding treatment plan.                Diagnosis 1:  Cervical Radiculopathy  Pain -  manual therapy, splint/taping/bracing/orthotics, self management and education  Decreased ROM/flexibility - manual therapy and therapeutic exercise  Decreased joint mobility - manual therapy and therapeutic exercise  Decreased strength - therapeutic exercise and therapeutic activities  Impaired muscle performance - neuro re-education  Impaired posture - neuro re-education    Therapy Evaluation Codes:     Cumulative Therapy Evaluation is: Low complexity.    Previous and current functional limitations:  (See Goal Flow Sheet for this information)    Short term and Long term goals: (See Goal Flow Sheet for this information)     Communication ability:  Patient appears to be able to clearly communicate and  understand verbal and written communication and follow directions correctly.  Treatment Explanation - The following has been discussed with the patient:   RX ordered/plan of care  Anticipated outcomes  Possible risks and side effects  Re: Romi Cortez   :   1960    This patient would benefit from PT intervention to resume normal activities.   Rehab potential is good.    Frequency:  1 X week, once daily  Duration:  for 8 weeks  Discharge Plan:  Achieve all LTG.  Independent in home treatment program.  Reach maximal therapeutic benefit.    Thank you for your referral.    INQUIRIES  Therapist: Jean Pennington, PT, DPT, [facility] [address] [place] [address]  Phone: [phone]  Fax: [phone]

## 2021-04-27 NOTE — PROGRESS NOTES
Physical Therapy Initial Evaluation  Subjective:    Therapist Generated HPI Evaluation  Problem details: Pt presents to PT with a chief complaint of chronic cervical radiculopathy with recent worsening of hand numbness (~01/2021). Pt reports her radicular symptoms have become more constant lately and pt hoping to return to her previous HEP and traction (previously helpful). Cervical pain worse with prolonged UE activity (playing instruments, lifting/carrying). .         Type of problem:  Cervical spine.    This is a chronic condition.  Condition occurred with:  Degenerative joint disease.  Where condition occurred: for unknown reasons.  Patient reports pain:  Cervical right side.  Pain is described as sharp and shooting and is intermittent (constant numbness/tingling ).  Pain radiates to:  Shoulder right, upper arm right, lower arm right and hand right. Pain is worse in the P.M..  Since onset symptoms are gradually worsening.  Associated symptoms:  Loss of motion/stiffness, loss of strength, numbness and tingling. Symptoms are exacerbated by carrying, lifting, rotating head and looking up or down  and relieved by rest and NSAID's.  Special tests included:  X-ray and MRI.    Restrictions due to condition include:  Working in normal job without restrictions.  Barriers include:  None as reported by patient.    Patient Health History         Pain is reported as 6/10 on pain scale.  General health as reported by patient is good.  Pertinent medical history includes: depression, menopausal and numbness/tingling.   Red flags:  None as reported by patient.  Medical allergies: none.   Surgeries include:  None.    Current medications:  Anti-depressants and anti-seizure medication.    Occupation: Musician    Primary job tasks include:  Computer work and driving.                                    Objective:  Standing Alignment:    Cervical/Thoracic:  Forward head                                    Cervical/Thoracic  Evaluation    AROM:  AROM Cervical:    Flexion:          Min loss, pain +  Extension:       Mod loss, pain +  Rotation:         Left: Min loss     Right: Mod loss  Side Bend:      Left:     Right:       Headaches: cervical  Cervical Myotomes:  normal                    Neural Tension:      Right side:  Median positive.  Cervical Dermatomes:              C6 right:  Hypo-light touch    C7 right:  Hypo-light touch              Spinal Segmental Conclusions:    Level:  Hypo at C7 and C6             Shoulder Evaluation:  ROM:          Strength:        Abduction:  Left: 4+/5  Pain:    Right: 4+/5     Pain:      External Rotation:   Left:4+/5     Pain:   Right:4+/5     Pain:                                                     General     ROS    Assessment/Plan:    Patient is a 61 year old female with cervical complaints.    Patient has the following significant findings with corresponding treatment plan.                Diagnosis 1:  Cervical Radiculopathy  Pain -  manual therapy, splint/taping/bracing/orthotics, self management and education  Decreased ROM/flexibility - manual therapy and therapeutic exercise  Decreased joint mobility - manual therapy and therapeutic exercise  Decreased strength - therapeutic exercise and therapeutic activities  Impaired muscle performance - neuro re-education  Impaired posture - neuro re-education    Therapy Evaluation Codes:     Cumulative Therapy Evaluation is: Low complexity.    Previous and current functional limitations:  (See Goal Flow Sheet for this information)    Short term and Long term goals: (See Goal Flow Sheet for this information)     Communication ability:  Patient appears to be able to clearly communicate and understand verbal and written communication and follow directions correctly.  Treatment Explanation - The following has been discussed with the patient:   RX ordered/plan of care  Anticipated outcomes  Possible risks and side effects  This patient would benefit from  PT intervention to resume normal activities.   Rehab potential is good.    Frequency:  1 X week, once daily  Duration:  for 8 weeks  Discharge Plan:  Achieve all LTG.  Independent in home treatment program.  Reach maximal therapeutic benefit.    Please refer to the daily flowsheet for treatment today, total treatment time and time spent performing 1:1 timed codes.

## 2021-04-29 ENCOUNTER — ANCILLARY PROCEDURE (OUTPATIENT)
Dept: MAMMOGRAPHY | Facility: CLINIC | Age: 61
End: 2021-04-29
Attending: FAMILY MEDICINE
Payer: COMMERCIAL

## 2021-04-29 DIAGNOSIS — Z12.31 VISIT FOR SCREENING MAMMOGRAM: ICD-10-CM

## 2021-04-29 PROCEDURE — 77067 SCR MAMMO BI INCL CAD: CPT | Mod: 26 | Performed by: RADIOLOGY

## 2021-04-29 PROCEDURE — 77067 SCR MAMMO BI INCL CAD: CPT

## 2021-09-04 ENCOUNTER — HEALTH MAINTENANCE LETTER (OUTPATIENT)
Age: 61
End: 2021-09-04

## 2021-09-15 ENCOUNTER — THERAPY VISIT (OUTPATIENT)
Dept: PHYSICAL THERAPY | Facility: CLINIC | Age: 61
End: 2021-09-15
Payer: COMMERCIAL

## 2021-09-15 DIAGNOSIS — M54.2 CERVICALGIA: Primary | ICD-10-CM

## 2021-09-15 PROCEDURE — 97110 THERAPEUTIC EXERCISES: CPT | Mod: GP | Performed by: PHYSICAL THERAPIST

## 2021-09-15 PROCEDURE — 97161 PT EVAL LOW COMPLEX 20 MIN: CPT | Mod: GP | Performed by: PHYSICAL THERAPIST
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
## 2021-10-13 ENCOUNTER — THERAPY VISIT (OUTPATIENT)
Dept: PHYSICAL THERAPY | Facility: CLINIC | Age: 61
End: 2021-10-13
Payer: COMMERCIAL

## 2021-10-13 DIAGNOSIS — M25.511 RIGHT SHOULDER PAIN: ICD-10-CM

## 2021-10-13 DIAGNOSIS — M54.2 CERVICALGIA: Primary | ICD-10-CM

## 2021-10-13 PROCEDURE — 97112 NEUROMUSCULAR REEDUCATION: CPT | Mod: GP | Performed by: PHYSICAL THERAPIST

## 2021-10-13 PROCEDURE — 97110 THERAPEUTIC EXERCISES: CPT | Mod: GP | Performed by: PHYSICAL THERAPIST

## 2021-10-22 ENCOUNTER — THERAPY VISIT (OUTPATIENT)
Dept: PHYSICAL THERAPY | Facility: CLINIC | Age: 61
End: 2021-10-22
Payer: COMMERCIAL

## 2021-10-22 DIAGNOSIS — M25.511 RIGHT SHOULDER PAIN: Primary | ICD-10-CM

## 2021-10-22 PROCEDURE — 97110 THERAPEUTIC EXERCISES: CPT | Mod: GP | Performed by: PHYSICAL THERAPIST

## 2021-10-22 PROCEDURE — 97112 NEUROMUSCULAR REEDUCATION: CPT | Mod: GP | Performed by: PHYSICAL THERAPIST
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
## 2021-10-26 NOTE — TELEPHONE ENCOUNTER
DIAGNOSIS: (R) shoulder - rotorcuff ??  / self ref / HP / no images / no surgery   APPOINTMENT DATE: 11/2/2021   NOTES STATUS DETAILS   OFFICE NOTE from referring provider N/A    OFFICE NOTE from other specialist Internal 10/22/2021 PT note from Jean Pennington F, PT     DISCHARGE SUMMARY from hospital N/A    DISCHARGE REPORT from the ER N/A    OPERATIVE REPORT N/A    EMG report N/A    MEDICATION LIST Internal    MRI N/A    DEXA (osteoporosis/bone health) N/A    CT SCAN N/A    XRAYS (IMAGES & REPORTS) N/A

## 2021-11-01 DIAGNOSIS — M25.511 RIGHT SHOULDER PAIN: Primary | ICD-10-CM

## 2021-11-02 ENCOUNTER — PRE VISIT (OUTPATIENT)
Dept: ORTHOPEDICS | Facility: CLINIC | Age: 61
End: 2021-11-02

## 2021-11-02 ENCOUNTER — ANCILLARY PROCEDURE (OUTPATIENT)
Dept: GENERAL RADIOLOGY | Facility: CLINIC | Age: 61
End: 2021-11-02
Attending: FAMILY MEDICINE
Payer: COMMERCIAL

## 2021-11-02 ENCOUNTER — OFFICE VISIT (OUTPATIENT)
Dept: ORTHOPEDICS | Facility: CLINIC | Age: 61
End: 2021-11-02
Payer: COMMERCIAL

## 2021-11-02 VITALS — WEIGHT: 215 LBS | BODY MASS INDEX: 34.55 KG/M2 | HEIGHT: 66 IN

## 2021-11-02 DIAGNOSIS — M25.511 ACUTE PAIN OF RIGHT SHOULDER: Primary | ICD-10-CM

## 2021-11-02 DIAGNOSIS — M25.511 RIGHT SHOULDER PAIN: ICD-10-CM

## 2021-11-02 PROCEDURE — 73030 X-RAY EXAM OF SHOULDER: CPT | Mod: RT | Performed by: RADIOLOGY

## 2021-11-02 PROCEDURE — 99203 OFFICE O/P NEW LOW 30 MIN: CPT | Performed by: FAMILY MEDICINE

## 2021-11-02 ASSESSMENT — MIFFLIN-ST. JEOR: SCORE: 1539.04
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
## 2021-11-17 ENCOUNTER — ANCILLARY PROCEDURE (OUTPATIENT)
Dept: MRI IMAGING | Facility: CLINIC | Age: 61
End: 2021-11-17
Attending: FAMILY MEDICINE
Payer: COMMERCIAL

## 2021-11-17 DIAGNOSIS — M25.511 ACUTE PAIN OF RIGHT SHOULDER: ICD-10-CM

## 2021-11-17 PROCEDURE — 73221 MRI JOINT UPR EXTREM W/O DYE: CPT | Mod: RT | Performed by: RADIOLOGY

## 2021-11-23 ENCOUNTER — OFFICE VISIT (OUTPATIENT)
Dept: ORTHOPEDICS | Facility: CLINIC | Age: 61
End: 2021-11-23
Payer: COMMERCIAL

## 2021-11-23 VITALS — WEIGHT: 215 LBS | HEIGHT: 66 IN | BODY MASS INDEX: 34.55 KG/M2

## 2021-11-23 DIAGNOSIS — S46.011D TRAUMATIC COMPLETE TEAR OF RIGHT ROTATOR CUFF, SUBSEQUENT ENCOUNTER: Primary | ICD-10-CM

## 2021-11-23 PROCEDURE — 99214 OFFICE O/P EST MOD 30 MIN: CPT | Performed by: FAMILY MEDICINE

## 2021-11-23 ASSESSMENT — MIFFLIN-ST. JEOR: SCORE: 1539.04

## 2021-11-23 NOTE — PROGRESS NOTES
"ESTABLISHED PATIENT FOLLOW-UP:  Follow Up of the Right Shoulder       HISTORY OF PRESENT ILLNESS  Ms. Cortez is a pleasant 61 year old year old female who presents to clinic today for follow-up of right shoulder MRI results.    Date of injury: 9/7/21  Date last seen: 11/2/21  Following Therapeutic Plan: Yes, MRI  Pain: Same  Function: Same  Interval History: anterior shoulder pain     Treatment to date:  Physical Therapy, Medrol jamar, HEP, rest    She has been unable to return to biking, workouts, playing violin over the past 2.5 months since 9/7/21.  Shoulder slow to progress and unable to recruit strength with band exercises.      Additional medical/Social/Surgical histories reviewed in [facility] and updated as appropriate.    REVIEW OF SYSTEMS (11/23/2021)  CONSTITUTIONAL: Denies fever and weight loss  GASTROINTESTINAL: Denies abdominal pain, nausea, vomiting  MUSCULOSKELETAL: See HPI  SKIN: Denies any recent rash or lesion  NEUROLOGICAL: Denies numbness or focal weakness     PHYSICAL EXAM  Ht 1.664 m (5' 5.5\")   Wt 97.5 kg (215 lb)   BMI 35.23 kg/m        General  - normal appearance, in no obvious distress  Musculoskeletal - Right shoulder  - inspection: normal bone and joint alignment, no obvious deformity, no scapular winging, no AC step-off  - palpation: mildly tender RC insertion and bicipital groove pain, normal clavicle, non-tender AC  - ROM: Full FE, abduction with pain >100. ER painful terminally but full. IR painful but symmetric.  - strength: 5/5  strength, 5/5 in all shoulder planes except 4/5 IR with pain.  - special tests:  (-) Speed's  (+) Neer  (+) Hawkin's  (+) Antwon's  (-) Reynolds's  (-) Speed  (-) subscap lift-off  Neuro  - no sensory or motor deficit, grossly normal coordination, normal muscle tone    IMAGING :  MR SHOULDER RIGHT W/O CON  Impression:  1. Rotator cuff pathology:  *  Significant tendinosis of the supraspinatus width full-thickness  tear of the anterior through mid supraspinatus " at the footprint.   *  Moderate to high-grade intrasubstance tearing of the posterior  supraspinatus-anterior infraspinatus just proximal to the footprint.   *  Subscapularis tendinosis with moderate grade articular sided  tearing of the upper-mid fibers at the footprint.   *  Mild diffuse fatty infiltration of the rotator cuff muscle bulk  with mild supraspinatus atrophy.     2. Small moderate joint effusion with synovitis. This communicates  with the subacromial subdeltoid bursa. Tenosynovitis of the  extra-articular long head of the biceps tendon.     3. Tendinosis of the intra-articular long head of biceps tendon.     REGINALD (Danilo MONTES MD      ASSESSMENT & PLAN  Ms. Cortez is a 61 year old year old female who presents to clinic today with Follow Up of the Right Shoulder    Romi is an active individual who enjoys playing her violin, biking as well as performing resistance gym exercises.  She has been unable to do any of these activities over the past 2.5 months.  She is not progressing in her home exercises regarding strengthening and forward elevation and external rotation.  I suspect her full-thickness tears will continue to be limiting to her active lifestyle.    Diagnosis:  1. Full thickness tear of supraspinatus tendon  2. Synovitis of left shoulder  3. Acute traumatic pain of left shoulder    -Surgical vs nonsurgical options discussed  -Continue with HEP  -Shoulder surgeon referral provided given lack of significant improvement in strength over the past 2.5 months.  -Follow up 4 weeks.    It was a pleasure seeing Romi.    David Benites DO, [facility]  Primary Care Sports Medicine

## 2021-11-23 NOTE — LETTER
"  11/23/2021      RE: Romi Cortez  [address] [facility] [address]       ESTABLISHED PATIENT FOLLOW-UP:  Follow Up of the Right Shoulder       HISTORY OF PRESENT ILLNESS  Ms. Cortez is a pleasant 61 year old year old female who presents to clinic today for follow-up of right shoulder MRI results.    Date of injury: 9/7/21  Date last seen: 11/2/21  Following Therapeutic Plan: Yes, MRI  Pain: Same  Function: Same  Interval History: anterior shoulder pain     Treatment to date:  Physical Therapy, Medrol jamar, HEP, rest    She has been unable to return to biking, workouts, playing violin over the past 2.5 months since 9/7/21.  Shoulder slow to progress and unable to recruit strength with band exercises.      Additional medical/Social/Surgical histories reviewed in [facility] and updated as appropriate.    REVIEW OF SYSTEMS (11/23/2021)  CONSTITUTIONAL: Denies fever and weight loss  GASTROINTESTINAL: Denies abdominal pain, nausea, vomiting  MUSCULOSKELETAL: See HPI  SKIN: Denies any recent rash or lesion  NEUROLOGICAL: Denies numbness or focal weakness     PHYSICAL EXAM  Ht 1.664 m (5' 5.5\")   Wt 97.5 kg (215 lb)   BMI 35.23 kg/m        General  - normal appearance, in no obvious distress  Musculoskeletal - Right shoulder  - inspection: normal bone and joint alignment, no obvious deformity, no scapular winging, no AC step-off  - palpation: mildly tender RC insertion and bicipital groove pain, normal clavicle, non-tender AC  - ROM: Full FE, abduction with pain >100. ER painful terminally but full. IR painful but symmetric.  - strength: 5/5  strength, 5/5 in all shoulder planes except 4/5 IR with pain.  - special tests:  (-) Speed's  (+) Neer  (+) Hawkin's  (+) Antwon's  (-) Florence's  (-) Speed  (-) subscap lift-off  Neuro  - no sensory or motor deficit, grossly normal coordination, normal muscle tone    IMAGING :  MR SHOULDER RIGHT W/O CON  Impression:  1. Rotator cuff pathology:  *  Significant tendinosis of " the supraspinatus width full-thickness  tear of the anterior through mid supraspinatus at the footprint.   *  Moderate to high-grade intrasubstance tearing of the posterior  supraspinatus-anterior infraspinatus just proximal to the footprint.   *  Subscapularis tendinosis with moderate grade articular sided  tearing of the upper-mid fibers at the footprint.   *  Mild diffuse fatty infiltration of the rotator cuff muscle bulk  with mild supraspinatus atrophy.     2. Small moderate joint effusion with synovitis. This communicates  with the subacromial subdeltoid bursa. Tenosynovitis of the  extra-articular long head of the biceps tendon.     3. Tendinosis of the intra-articular long head of biceps tendon.     REGINALD (Danilo MONTES MD      ASSESSMENT & PLAN  Ms. Cortez is a 61 year old year old female who presents to clinic today with Follow Up of the Right Shoulder    Romi is an active individual who enjoys playing her violin, biking as well as performing resistance gym exercises.  She has been unable to do any of these activities over the past 2.5 months.  She is not progressing in her home exercises regarding strengthening and forward elevation and external rotation.  I suspect her full-thickness tears will continue to be limiting to her active lifestyle.    Diagnosis:  1. Full thickness tear of supraspinatus tendon  2. Synovitis of left shoulder  3. Acute traumatic pain of left shoulder    -Surgical vs nonsurgical options discussed  -Continue with HEP  -Shoulder surgeon referral provided given lack of significant improvement in strength over the past 2.5 months.  -Follow up 4 weeks.    It was a pleasure seeing Romi.    David Benites DO, [facility]  Primary Care Sports Medicine

## 2021-11-24 NOTE — TELEPHONE ENCOUNTER
DIAGNOSIS: surgery consult   APPOINTMENT DATE: 12.20.21   NOTES STATUS DETAILS   OFFICE NOTE from referring provider Internal 11.23.21 Dr David Benites, [facility]  11.2.21   OFFICE NOTE from other specialist Internal PT in [facility]   DISCHARGE SUMMARY from hospital N/A    DISCHARGE REPORT from the ER N/A    OPERATIVE REPORT N/A    MEDICATION LIST Internal    EMG (for Spine) N/A    IMPLANT RECORD/STICKER N/A    LABS     CBC/DIFF Care Everywhere    CULTURES N/A    INJECTIONS DONE IN RADIOLOGY N/A    MRI Internal 11.17.21 R shoulder   CT SCAN N/A    XRAYS (IMAGES & REPORTS) Internal 11.2.21 R shoulder   TUMOR     PATHOLOGY  Slides & report N/A

## 2021-11-29 ENCOUNTER — OFFICE VISIT (OUTPATIENT)
Dept: DERMATOLOGY | Facility: CLINIC | Age: 61
End: 2021-11-29
Payer: COMMERCIAL

## 2021-11-29 DIAGNOSIS — D18.01 CHERRY ANGIOMA: ICD-10-CM

## 2021-11-29 DIAGNOSIS — Z12.83 SKIN CANCER SCREENING: Primary | ICD-10-CM

## 2021-11-29 DIAGNOSIS — L82.1 SEBORRHEIC KERATOSES: ICD-10-CM

## 2021-11-29 DIAGNOSIS — L30.9 CHRONIC DERMATITIS OF HANDS: ICD-10-CM

## 2021-11-29 DIAGNOSIS — L81.4 SOLAR LENTIGINOSIS: ICD-10-CM

## 2021-11-29 DIAGNOSIS — D22.9 MULTIPLE PIGMENTED NEVI: ICD-10-CM

## 2021-11-29 DIAGNOSIS — L30.9 ECZEMA OF BOTH HANDS: ICD-10-CM

## 2021-11-29 DIAGNOSIS — D48.5 NEOPLASM OF UNCERTAIN BEHAVIOR OF SKIN: ICD-10-CM

## 2021-11-29 PROCEDURE — 88305 TISSUE EXAM BY PATHOLOGIST: CPT | Mod: TC | Performed by: PHYSICIAN ASSISTANT

## 2021-11-29 PROCEDURE — 99213 OFFICE O/P EST LOW 20 MIN: CPT | Mod: 25 | Performed by: PHYSICIAN ASSISTANT

## 2021-11-29 PROCEDURE — 88305 TISSUE EXAM BY PATHOLOGIST: CPT | Mod: 26 | Performed by: DERMATOLOGY

## 2021-11-29 PROCEDURE — 11102 TANGNTL BX SKIN SINGLE LES: CPT | Performed by: PHYSICIAN ASSISTANT

## 2021-11-29 RX ORDER — TRIAMCINOLONE ACETONIDE 1 MG/G
OINTMENT TOPICAL 2 TIMES DAILY
Qty: 80 G | Refills: 3 | Status: SHIPPED | OUTPATIENT
Start: 2021-11-29 | End: 2023-12-02

## 2021-11-29 ASSESSMENT — PAIN SCALES - GENERAL: PAINLEVEL: NO PAIN (0)

## 2021-11-29 NOTE — PROGRESS NOTES
[facility] Dermatology Note      Dermatology Problem List:  0. NUB left anterior thigh, s/p shave biopsy 11/29/2021  1. AKs s/p cryo  2. Hx Seborrheic dermatitis  - ketoconazole 2% shampoo  3. Eczema - hands  - triamcinolone 0.1% ointment  4. Skin cancer screening 11/29/21  5. Benign bx: NUB on the Left preauricular sebaceous hyperplasia 4/9/2019    Encounter Date: Nov 29, 2021    CC:  Chief Complaint   Patient presents with     Skin Check     pt states she is here for a full body skin check.         History of Present Illness:  Ms. Romi Cortez is a 61 year old female who presents as a follow-up for FBSE. She was last seen in clinic 4/9/2019 when she had a when she had a biopsy on the left preauricular cheek that returned a sebaceous hyperplasia.  She states her eczema tends to flare with seasonal changes on her hands and feet.  She uses triamcinolone 0.1% ointment twice daily when this flares on this helps these areas go away more quickly.    She is here today for a full skin examination.  She has not had to use ketoconazole shampoo on her scalp.  She is using gentle shampoos.    She denies any spots that are painful, bleeding, itchy or changing. She states she has a spot on her left anterior thigh that has not changed but is not going away.     Patient does have a family history of melanoma (brother) and basal cell carcinoma in her mother..    Past Medical History:   Patient Active Problem List   Diagnosis     Appendicitis     Dermatitis, seborrheic     AK (actinic keratosis)     Senile sebaceous gland hyperplasia     Lentigines     Injury of forearm muscle or tendon, unspecified laterality, subsequent encounter     No past medical history on file.  Past Surgical History:   Procedure Laterality Date     D & C  1996     LAPAROSCOPIC APPENDECTOMY  11/21/2013    Procedure: LAPAROSCOPIC APPENDECTOMY;  Laparoscopic Appendectomy;  Surgeon: Sung Rodriguez MD;  Location:  OR        Social History:   reports that she has never smoked. She has never used smokeless tobacco. She reports that she does not drink alcohol and does not use drugs.    Family History:  Family History   Problem Relation Age of Onset     Cancer Mother         skin cancer     Glaucoma Mother      Skin Cancer Mother      Melanoma Brother        Medications:  Current Outpatient Medications   Medication Sig Dispense Refill     ARTIFICIAL TEAR OP Apply 1 drop to eye as needed Both eyes, 1-2 times a month       LamoTRIgine (LAMICTAL PO) Take 200 mg by mouth daily       methylPREDNISolone (MEDROL DOSEPAK) 4 MG tablet Follow package instructions 21 tablet 0     sertraline (ZOLOFT) 100 MG tablet Take by mouth daily       triamcinolone (KENALOG) 0.025 % ointment Apply topically 2 times daily       zolpidem (AMBIEN) 5 MG tablet Take 1 tablet (5 mg) by mouth nightly as needed for sleep 1 tablet 0       No Known Allergies    Review of Systems:  -Constitutional: The patient denies fatigue, fevers, chills, unintended weight loss, and night sweats.  -HEENT: Patient denies nonhealing oral sores.  -Skin: As above in HPI. No additional skin concerns.  -Heme/Lymph: no concerning bumps, no bleeding or bruising problems     Physical exam:  Vitals: There were no vitals taken for this visit.  GEN: This is a well developed, well-nourished female in no acute distress, in a pleasant mood.    SKIN: Full skin, which includes the head/face, both arms, chest, back, abdomen,both legs, genitalia and/or groin buttocks, digits and/or nails, was examined.  -Aguirre's skin type I, less than 100 nevi -  -There is a 8 mm pink thin plaque on the left anterior thigh,  There are pink scaly plaques scattered on the fingers and dorsal hands and a few areas on the dorsal toes  -A few Waxy white, brown and tan stuck on- appearing papules and plaques to face, trunk and extremities.  -Few scattered erythematous non blanching papules to trunk and upper  extremities.  Light brown macules on sun exposed areas  -No other lesions of concern on areas examined.     Impression/Plan:  1. Family history of melanoma in the patient's brother. BCC mother.     ABCDs of melanoma were discussed and self skin checks were advised.    Sunscreen: Apply 20 minutes prior to going outdoors and reapply every two hours, when wet or sweating. We recommend using an SPF 30 or higher, and to use one that is water resistant.       Recommended yearly skin examination due to positive family history melanoma    2. Neoplasm of uncertain behavior on the Left anterior thigh. The differential diagnosis includes superficial BCC vs     Shave biopsy:  After discussion of benefits and risks including but not limited to bleeding/bruising, pain/swelling, infection, scar, incomplete removal, nerve damage/numbness, recurrence, and non-diagnostic biopsy, written consent, verbal consent and photographs were obtained. Time-out was performed. The area was cleaned with isopropyl alcohol. 0.5ml of 1% lidocaine with 1:100,000 epinephrine was injected to obtain adequate anesthesia. A shave biopsy was performed. Hemostasis was achieved with aluminium chloride. Vaseline and a sterile dressing were applied. The patient tolerated the procedure and no complications were noted. The patient was provided with verbal and written post care instructions.    Photograph was obtained for clinical monitoring and inclusion in medical record.    3.  Benign findings on exam, cherry angiomas and seborrheic keratoses  4.  Less than 100 nevi on exam and some solar lentigines on sun exposed areas.  -Reviewed sun protection    5. Hand and foot dermatitis,   -Use triamcinolone 0.1% ointment twice daily as needed  Continue emollients.    CC Dr. Hoang on close of this encounter.  Follow-up in 1 year, earlier for new or changing lesions.       Staff Involved:    All risks, benefits and alternatives were discussed with patient.  Patient is  in agreement and understands the assessment and plan.  All questions were answered.  Sun Screen Education was given.   Return to Clinic annually or sooner as needed.   Tran Cool PA-C

## 2021-11-29 NOTE — LETTER
11/29/2021       RE: Romi Cortez  [address] [facility] [address]     Dear Colleague,    Thank you for referring your patient, Romi Cortez, to the [facility] DERMATOLOGY CLINIC [place] at [facility]. Please see a copy of my visit note below.    [facility] Dermatology Note      Dermatology Problem List:  0. NUB left anterior thigh, s/p shave biopsy 11/29/2021  1. AKs s/p cryo  2. Hx Seborrheic dermatitis  - ketoconazole 2% shampoo  3. Eczema - hands  - triamcinolone 0.1% ointment  4. Skin cancer screening 11/29/21  5. Benign bx: NUB on the Left preauricular sebaceous hyperplasia 4/9/2019    Encounter Date: Nov 29, 2021    CC:  Chief Complaint   Patient presents with     Skin Check     pt states she is here for a full body skin check.         History of Present Illness:  Ms. Romi Cortez is a 61 year old female who presents as a follow-up for FBSE. She was last seen in clinic 4/9/2019 when she had a when she had a biopsy on the left preauricular cheek that returned a sebaceous hyperplasia.  She states her eczema tends to flare with seasonal changes on her hands and feet.  She uses triamcinolone 0.1% ointment twice daily when this flares on this helps these areas go away more quickly.    She is here today for a full skin examination.  She has not had to use ketoconazole shampoo on her scalp.  She is using gentle shampoos.    She denies any spots that are painful, bleeding, itchy or changing. She states she has a spot on her left anterior thigh that has not changed but is not going away.     Patient does have a family history of melanoma (brother) and basal cell carcinoma in her mother..    Past Medical History:   Patient Active Problem List   Diagnosis     Appendicitis     Dermatitis, seborrheic     AK (actinic keratosis)     Senile sebaceous gland hyperplasia     Lentigines     Injury of forearm muscle or tendon,  unspecified laterality, subsequent encounter     No past medical history on file.  Past Surgical History:   Procedure Laterality Date     D & C  1996     LAPAROSCOPIC APPENDECTOMY  11/21/2013    Procedure: LAPAROSCOPIC APPENDECTOMY;  Laparoscopic Appendectomy;  Surgeon: Sung Rodriguez MD;  Location:  OR       Social History:   reports that she has never smoked. She has never used smokeless tobacco. She reports that she does not drink alcohol and does not use drugs.    Family History:  Family History   Problem Relation Age of Onset     Cancer Mother         skin cancer     Glaucoma Mother      Skin Cancer Mother      Melanoma Brother        Medications:  Current Outpatient Medications   Medication Sig Dispense Refill     ARTIFICIAL TEAR OP Apply 1 drop to eye as needed Both eyes, 1-2 times a month       LamoTRIgine (LAMICTAL PO) Take 200 mg by mouth daily       methylPREDNISolone (MEDROL DOSEPAK) 4 MG tablet Follow package instructions 21 tablet 0     sertraline (ZOLOFT) 100 MG tablet Take by mouth daily       triamcinolone (KENALOG) 0.025 % ointment Apply topically 2 times daily       zolpidem (AMBIEN) 5 MG tablet Take 1 tablet (5 mg) by mouth nightly as needed for sleep 1 tablet 0       No Known Allergies    Review of Systems:  -Constitutional: The patient denies fatigue, fevers, chills, unintended weight loss, and night sweats.  -HEENT: Patient denies nonhealing oral sores.  -Skin: As above in HPI. No additional skin concerns.  -Heme/Lymph: no concerning bumps, no bleeding or bruising problems     Physical exam:  Vitals: There were no vitals taken for this visit.  GEN: This is a well developed, well-nourished female in no acute distress, in a pleasant mood.    SKIN: Full skin, which includes the head/face, both arms, chest, back, abdomen,both legs, genitalia and/or groin buttocks, digits and/or nails, was examined.  -Aguirre's skin type I, less than 100 nevi -  -There is a 8 mm pink thin plaque on  the left anterior thigh,  There are pink scaly plaques scattered on the fingers and dorsal hands and a few areas on the dorsal toes  -A few Waxy white, brown and tan stuck on- appearing papules and plaques to face, trunk and extremities.  -Few scattered erythematous non blanching papules to trunk and upper extremities.  Light brown macules on sun exposed areas  -No other lesions of concern on areas examined.     Impression/Plan:  1. Family history of melanoma in the patient's brother. BCC mother.     ABCDs of melanoma were discussed and self skin checks were advised.    Sunscreen: Apply 20 minutes prior to going outdoors and reapply every two hours, when wet or sweating. We recommend using an SPF 30 or higher, and to use one that is water resistant.       Recommended yearly skin examination due to positive family history melanoma    2. Neoplasm of uncertain behavior on the Left anterior thigh. The differential diagnosis includes superficial BCC vs     Shave biopsy:  After discussion of benefits and risks including but not limited to bleeding/bruising, pain/swelling, infection, scar, incomplete removal, nerve damage/numbness, recurrence, and non-diagnostic biopsy, written consent, verbal consent and photographs were obtained. Time-out was performed. The area was cleaned with isopropyl alcohol. 0.5ml of 1% lidocaine with 1:100,000 epinephrine was injected to obtain adequate anesthesia. A shave biopsy was performed. Hemostasis was achieved with aluminium chloride. Vaseline and a sterile dressing were applied. The patient tolerated the procedure and no complications were noted. The patient was provided with verbal and written post care instructions.    Photograph was obtained for clinical monitoring and inclusion in medical record.    3.  Benign findings on exam, cherry angiomas and seborrheic keratoses  4.  Less than 100 nevi on exam and some solar lentigines on sun exposed areas.  -Reviewed sun protection    5. Hand  and foot dermatitis,   -Use triamcinolone 0.1% ointment twice daily as needed  Continue emollients.    CC Dr. Hoang on close of this encounter.  Follow-up in 1 year, earlier for new or changing lesions.       Staff Involved:    All risks, benefits and alternatives were discussed with patient.  Patient is in agreement and understands the assessment and plan.  All questions were answered.  Sun Screen Education was given.   Return to Clinic annually or sooner as needed.   Tran Cool PA-C

## 2021-11-29 NOTE — PATIENT INSTRUCTIONS

## 2021-12-02 LAB
PATH REPORT.COMMENTS IMP SPEC: NORMAL
PATH REPORT.COMMENTS IMP SPEC: NORMAL
PATH REPORT.FINAL DX SPEC: NORMAL
PATH REPORT.GROSS SPEC: NORMAL
PATH REPORT.MICROSCOPIC SPEC OTHER STN: NORMAL
PATH REPORT.RELEVANT HX SPEC: NORMAL

## 2021-12-20 ENCOUNTER — PRE VISIT (OUTPATIENT)
Dept: ORTHOPEDICS | Facility: CLINIC | Age: 61
End: 2021-12-20

## 2022-01-12 ENCOUNTER — OFFICE VISIT (OUTPATIENT)
Dept: ORTHOPEDICS | Facility: CLINIC | Age: 62
End: 2022-01-12
Attending: FAMILY MEDICINE
Payer: COMMERCIAL

## 2022-01-12 VITALS — BODY MASS INDEX: 36.22 KG/M2 | HEIGHT: 65 IN | WEIGHT: 217.4 LBS

## 2022-01-12 DIAGNOSIS — S46.011D TRAUMATIC COMPLETE TEAR OF RIGHT ROTATOR CUFF, SUBSEQUENT ENCOUNTER: ICD-10-CM

## 2022-01-12 PROCEDURE — 99204 OFFICE O/P NEW MOD 45 MIN: CPT | Mod: GC | Performed by: ORTHOPAEDIC SURGERY

## 2022-01-12 ASSESSMENT — MIFFLIN-ST. JEOR: SCORE: 1545.74
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
## 2022-01-12 NOTE — NURSING NOTE
Teaching Flowsheet   Relevant Diagnosis: right shoulder pain  Teaching Topic: right rotator cuff repair     Person(s) involved in teaching:   Patient     Motivation Level:  Asks Questions: Yes  Eager to Learn: Yes  Cooperative: Yes  Receptive (willing/able to accept information): Yes  Any cultural factors/Episcopal beliefs that may influence understanding or compliance? No  Comments: none     Patient demonstrates understanding of the following:  Reason for the appointment, diagnosis and treatment plan: Yes  Knowledge of proper use of medications and conditions for which they are ordered (with special attention to potential side effects or drug interactions): Yes  Which situations necessitate calling provider and whom to contact: Yes       Teaching Concerns Addressed:   Comments: none     Proper use and care of ultrasling (medical equip, care aids, etc.): Yes  Nutritional needs and diet plan: Yes  Pain management techniques: Yes  Wound Care: Yes  How and/when to access community resources: Yes     Instructional Materials Used/Given: pre-op packet, surgical soap, chlorhexidine soap     Time spent with patient: 15 minutes.    Susan Camacho RN on 1/12/2022 at 12:33 PM

## 2022-01-14 ENCOUNTER — TELEPHONE (OUTPATIENT)
Dept: ORTHOPEDICS | Facility: CLINIC | Age: 62
End: 2022-01-14
Payer: COMMERCIAL

## 2022-01-14 PROBLEM — S46.011D TRAUMATIC COMPLETE TEAR OF RIGHT ROTATOR CUFF, SUBSEQUENT ENCOUNTER: Status: ACTIVE | Noted: 2022-01-14

## 2022-01-14 NOTE — TELEPHONE ENCOUNTER
Patient is scheduled for surgery with Dr. Cazares    Spoke with: Romi    Date of Surgery: 3/22/2022    Location: [facility]    Informed patient they will need an adult  yes    Pre op with Provider n/a    H&P: Scheduled with pcp    Pre-procedure COVID-19 Test: csc on 3/18/2022    Additional imaging/appointments: n/a    Surgery packet: Given in clinic    Additional comments: n/a

## 2022-02-16 DIAGNOSIS — Z11.59 ENCOUNTER FOR SCREENING FOR OTHER VIRAL DISEASES: Primary | ICD-10-CM

## 2022-02-19 ENCOUNTER — HEALTH MAINTENANCE LETTER (OUTPATIENT)
Age: 62
End: 2022-02-19

## 2022-03-02 ENCOUNTER — TELEPHONE (OUTPATIENT)
Dept: ORTHOPEDICS | Facility: CLINIC | Age: 62
End: 2022-03-02
Payer: COMMERCIAL

## 2022-03-02 NOTE — TELEPHONE ENCOUNTER
[facility] Call Center    Phone Message:  Pt called, requesting SUSAN.  Pt would like a CALL BACK from Susan, in regards to pt's shoulder surgery.    May a detailed message be left on voicemail: Yes     Reason for Call: Other: Surgery Questions:  CALL BACK     Action Taken: Message routed to:  [facility] Team to SUSAN    Travel Screening: Not Applicable

## 2022-03-02 NOTE — TELEPHONE ENCOUNTER
"Pt was wondering if she was \"feeling better\" if she would not need surgery, or would not need part of her surgery.  She was also wondering if Dr. 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 Pt wishes to follow up with Dr. Cazares to discuss surgical plan and offer any alternatives for the plan.  Writer helped pt make an appointment for 3/16/22. Pt appreciative of phone call back.    Susan Camacho RN on 3/2/2022 at 2:28 PM    "

## 2022-03-15 ENCOUNTER — TRANSCRIBE ORDERS (OUTPATIENT)
Dept: OTHER | Age: 62
End: 2022-03-15
Payer: COMMERCIAL

## 2022-03-15 DIAGNOSIS — Z12.11 COLON CANCER SCREENING: Primary | ICD-10-CM

## 2022-03-15 DIAGNOSIS — G47.30 SLEEP APNEA: Primary | ICD-10-CM

## 2022-03-16 ENCOUNTER — OFFICE VISIT (OUTPATIENT)
Dept: ORTHOPEDICS | Facility: CLINIC | Age: 62
End: 2022-03-16
Payer: COMMERCIAL

## 2022-03-16 DIAGNOSIS — S46.011D TRAUMATIC COMPLETE TEAR OF RIGHT ROTATOR CUFF, SUBSEQUENT ENCOUNTER: Primary | ICD-10-CM

## 2022-03-16 PROCEDURE — 99214 OFFICE O/P EST MOD 30 MIN: CPT | Performed by: ORTHOPAEDIC SURGERY
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
## 2022-03-16 NOTE — LETTER
3/16/2022         RE: Romi Cortez  [address] [facility] [address]        Dear Colleague,    Thank you for referring your patient, Romi Cortez, to the [facility] [place]. Please see a copy of my visit note below.    CHIEF CONCERN:  Right full thickness cuff tear    HISTORY OF PRESENT ILLNESS: Romi is a 62-year-old female who is seen today to discuss her surgical plan and answer perioperative questions.  She notes no change in her symptoms.  She gives herself a SANE score on the right of 50 today compared to 100 on the left.  She continues to have pain with shoulder height or overhead activities and her strength is decreased.  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    PHYSICAL EXAM:    Adult female in no acute distress. Articulates and communicates with normal affect.  Respirations even and unlabored  Focused upper extremity exam: Right shoulder active motion is active FE to 150 and external rotation to 80.  Strength remains decreased at 4 out of 5 in forward elevation and 4+ out of 5 and external rotation.  She does have alex tenderness palpation over the biceps groove.  She has a positive speeds and positive Dade's.    IMAGING:  None new    ASSESSMENT:  1. Traumatic complete tear of right rotator cuff  2. Traumatic tendinosis of the long head of the biceps tendon    PLAN:  Romi and I discussed today the preoperative plan, intraoperative plan, and postoperative course.  We extensively reviewed the risks benefits and alternatives to the surgical treatment proposed.  Recall she has had symptoms since her fall in September last year.  She has tried nonoperative management since that time.  She does feel that she would like to proceed with her surgical plan as previously outlined.  We again discussed biceps tenotomy versus tenodesis and she is comfortable with tenotomy.  She had the opportunity for questions to be answered  and she is comfortable with the plan at this time.      Belinda Cazares MD

## 2022-03-18 ENCOUNTER — LAB (OUTPATIENT)
Dept: LAB | Facility: CLINIC | Age: 62
End: 2022-03-18
Payer: COMMERCIAL

## 2022-03-18 DIAGNOSIS — Z11.59 ENCOUNTER FOR SCREENING FOR OTHER VIRAL DISEASES: ICD-10-CM

## 2022-03-18 LAB — SARS-COV-2 RNA RESP QL NAA+PROBE: NEGATIVE

## 2022-03-18 PROCEDURE — U0003 INFECTIOUS AGENT DETECTION BY NUCLEIC ACID (DNA OR RNA); SEVERE ACUTE RESPIRATORY SYNDROME CORONAVIRUS 2 (SARS-COV-2) (CORONAVIRUS DISEASE [COVID-19]), AMPLIFIED PROBE TECHNIQUE, MAKING USE OF HIGH THROUGHPUT TECHNOLOGIES AS DESCRIBED BY CMS-2020-01-R: HCPCS | Mod: 90 | Performed by: PATHOLOGY

## 2022-03-18 PROCEDURE — U0005 INFEC AGEN DETEC AMPLI PROBE: HCPCS | Mod: 90 | Performed by: PATHOLOGY

## 2022-03-18 PROCEDURE — 99000 SPECIMEN HANDLING OFFICE-LAB: CPT | Performed by: PATHOLOGY

## 2022-03-21 ENCOUNTER — ANESTHESIA EVENT (OUTPATIENT)
Dept: SURGERY | Facility: AMBULATORY SURGERY CENTER | Age: 62
End: 2022-03-21
Payer: COMMERCIAL

## 2022-03-22 ENCOUNTER — ANESTHESIA (OUTPATIENT)
Dept: SURGERY | Facility: AMBULATORY SURGERY CENTER | Age: 62
End: 2022-03-22
Payer: COMMERCIAL

## 2022-03-22 ENCOUNTER — HOSPITAL ENCOUNTER (OUTPATIENT)
Facility: AMBULATORY SURGERY CENTER | Age: 62
Discharge: HOME | End: 2022-03-22
Attending: ORTHOPAEDIC SURGERY
Payer: COMMERCIAL

## 2022-03-22 VITALS
HEIGHT: 66 IN | BODY MASS INDEX: 35.36 KG/M2 | WEIGHT: 220 LBS | OXYGEN SATURATION: 96 % | RESPIRATION RATE: 16 BRPM | HEART RATE: 70 BPM | SYSTOLIC BLOOD PRESSURE: 119 MMHG | DIASTOLIC BLOOD PRESSURE: 76 MMHG | TEMPERATURE: 96.5 F

## 2022-03-22 DIAGNOSIS — S46.011D TRAUMATIC COMPLETE TEAR OF RIGHT ROTATOR CUFF, SUBSEQUENT ENCOUNTER: ICD-10-CM

## 2022-03-22 PROCEDURE — 29827 SHO ARTHRS SRG RT8TR CUF RPR: CPT | Mod: RT | Performed by: ORTHOPAEDIC SURGERY

## 2022-03-22 PROCEDURE — 29823 SHO ARTHRS SRG XTNSV DBRDMT: CPT | Mod: 59,RT

## 2022-03-22 PROCEDURE — 29827 SHO ARTHRS SRG RT8TR CUF RPR: CPT | Mod: RT

## 2022-03-22 PROCEDURE — 29823 SHO ARTHRS SRG XTNSV DBRDMT: CPT | Mod: 59 | Performed by: ORTHOPAEDIC SURGERY

## 2022-03-22 DEVICE — BIO-COMP SWIVELOCK C, CLD 5.5X19.1MM
Type: IMPLANTABLE DEVICE | Site: SHOULDER | Status: FUNCTIONAL
Brand: ARTHREX®

## 2022-03-22 DEVICE — DBL LOADED 4.75MM BIO-COMP SWVLK
Type: IMPLANTABLE DEVICE | Site: SHOULDER | Status: FUNCTIONAL
Brand: ARTHREX®

## 2022-03-22 DEVICE — 4.75MM BC KNOTLESS SWIVELOCK W/ TAPE
Type: IMPLANTABLE DEVICE | Site: SHOULDER | Status: FUNCTIONAL
Brand: ARTHREX®

## 2022-03-22 RX ORDER — NALOXONE HYDROCHLORIDE 0.4 MG/ML
0.4 INJECTION, SOLUTION INTRAMUSCULAR; INTRAVENOUS; SUBCUTANEOUS
Status: DISCONTINUED | OUTPATIENT
Start: 2022-03-22 | End: 2022-03-22 | Stop reason: HOSPADM

## 2022-03-22 RX ORDER — CEFAZOLIN SODIUM 2 G/50ML
2 SOLUTION INTRAVENOUS SEE ADMIN INSTRUCTIONS
Status: DISCONTINUED | OUTPATIENT
Start: 2022-03-22 | End: 2022-03-22 | Stop reason: HOSPADM

## 2022-03-22 RX ORDER — DEXAMETHASONE SODIUM PHOSPHATE 4 MG/ML
INJECTION, SOLUTION INTRA-ARTICULAR; INTRALESIONAL; INTRAMUSCULAR; INTRAVENOUS; SOFT TISSUE PRN
Status: DISCONTINUED | OUTPATIENT
Start: 2022-03-22 | End: 2022-03-22

## 2022-03-22 RX ORDER — HALOPERIDOL 5 MG/ML
1 INJECTION INTRAMUSCULAR
Status: DISCONTINUED | OUTPATIENT
Start: 2022-03-22 | End: 2022-03-23 | Stop reason: HOSPADM

## 2022-03-22 RX ORDER — SODIUM CHLORIDE, SODIUM LACTATE, POTASSIUM CHLORIDE, CALCIUM CHLORIDE 600; 310; 30; 20 MG/100ML; MG/100ML; MG/100ML; MG/100ML
500 INJECTION, SOLUTION INTRAVENOUS CONTINUOUS
Status: DISCONTINUED | OUTPATIENT
Start: 2022-03-22 | End: 2022-03-22 | Stop reason: HOSPADM

## 2022-03-22 RX ORDER — ONDANSETRON 4 MG/1
4 TABLET, ORALLY DISINTEGRATING ORAL EVERY 30 MIN PRN
Status: DISCONTINUED | OUTPATIENT
Start: 2022-03-22 | End: 2022-03-23 | Stop reason: HOSPADM

## 2022-03-22 RX ORDER — ONDANSETRON 2 MG/ML
4 INJECTION INTRAMUSCULAR; INTRAVENOUS EVERY 30 MIN PRN
Status: DISCONTINUED | OUTPATIENT
Start: 2022-03-22 | End: 2022-03-23 | Stop reason: HOSPADM

## 2022-03-22 RX ORDER — PROPOFOL 10 MG/ML
INJECTION, EMULSION INTRAVENOUS PRN
Status: DISCONTINUED | OUTPATIENT
Start: 2022-03-22 | End: 2022-03-22

## 2022-03-22 RX ORDER — ACETAMINOPHEN 325 MG/1
650 TABLET ORAL EVERY 4 HOURS PRN
Qty: 50 TABLET | Refills: 0 | Status: SHIPPED | OUTPATIENT
Start: 2022-03-22 | End: 2023-02-16

## 2022-03-22 RX ORDER — BUPIVACAINE HYDROCHLORIDE 2.5 MG/ML
INJECTION, SOLUTION EPIDURAL; INFILTRATION; INTRACAUDAL
Status: COMPLETED | OUTPATIENT
Start: 2022-03-22 | End: 2022-03-22

## 2022-03-22 RX ORDER — IBUPROFEN 600 MG/1
600 TABLET, FILM COATED ORAL EVERY 6 HOURS PRN
Qty: 40 TABLET | Refills: 0 | Status: SHIPPED | OUTPATIENT
Start: 2022-03-22 | End: 2023-02-16

## 2022-03-22 RX ORDER — ACETAMINOPHEN 325 MG/1
650 TABLET ORAL
Status: DISCONTINUED | OUTPATIENT
Start: 2022-03-22 | End: 2022-03-23 | Stop reason: HOSPADM

## 2022-03-22 RX ORDER — GABAPENTIN 300 MG/1
300 CAPSULE ORAL
Status: COMPLETED | OUTPATIENT
Start: 2022-03-22 | End: 2022-03-22

## 2022-03-22 RX ORDER — ACETAMINOPHEN 325 MG/1
975 TABLET ORAL ONCE
Status: COMPLETED | OUTPATIENT
Start: 2022-03-22 | End: 2022-03-22

## 2022-03-22 RX ORDER — OXYCODONE HYDROCHLORIDE 5 MG/1
5 TABLET ORAL EVERY 4 HOURS PRN
Status: DISCONTINUED | OUTPATIENT
Start: 2022-03-22 | End: 2022-03-23 | Stop reason: HOSPADM

## 2022-03-22 RX ORDER — FENTANYL CITRATE 50 UG/ML
25-50 INJECTION, SOLUTION INTRAMUSCULAR; INTRAVENOUS
Status: DISCONTINUED | OUTPATIENT
Start: 2022-03-22 | End: 2022-03-22 | Stop reason: HOSPADM

## 2022-03-22 RX ORDER — AMOXICILLIN 250 MG
1-2 CAPSULE ORAL 2 TIMES DAILY
Qty: 30 TABLET | Refills: 0 | Status: SHIPPED | OUTPATIENT
Start: 2022-03-22 | End: 2023-02-16

## 2022-03-22 RX ORDER — HYDROMORPHONE HYDROCHLORIDE 1 MG/ML
0.4 INJECTION, SOLUTION INTRAMUSCULAR; INTRAVENOUS; SUBCUTANEOUS EVERY 10 MIN PRN
Status: DISCONTINUED | OUTPATIENT
Start: 2022-03-22 | End: 2022-03-22 | Stop reason: HOSPADM

## 2022-03-22 RX ORDER — NALOXONE HYDROCHLORIDE 0.4 MG/ML
0.2 INJECTION, SOLUTION INTRAMUSCULAR; INTRAVENOUS; SUBCUTANEOUS
Status: DISCONTINUED | OUTPATIENT
Start: 2022-03-22 | End: 2022-03-22 | Stop reason: HOSPADM

## 2022-03-22 RX ORDER — SODIUM CHLORIDE, SODIUM LACTATE, POTASSIUM CHLORIDE, CALCIUM CHLORIDE 600; 310; 30; 20 MG/100ML; MG/100ML; MG/100ML; MG/100ML
INJECTION, SOLUTION INTRAVENOUS CONTINUOUS
Status: DISCONTINUED | OUTPATIENT
Start: 2022-03-22 | End: 2022-03-22 | Stop reason: HOSPADM

## 2022-03-22 RX ORDER — PROPOFOL 10 MG/ML
INJECTION, EMULSION INTRAVENOUS CONTINUOUS PRN
Status: DISCONTINUED | OUTPATIENT
Start: 2022-03-22 | End: 2022-03-22

## 2022-03-22 RX ORDER — SODIUM CHLORIDE, SODIUM LACTATE, POTASSIUM CHLORIDE, CALCIUM CHLORIDE 600; 310; 30; 20 MG/100ML; MG/100ML; MG/100ML; MG/100ML
INJECTION, SOLUTION INTRAVENOUS CONTINUOUS PRN
Status: DISCONTINUED | OUTPATIENT
Start: 2022-03-22 | End: 2022-03-22

## 2022-03-22 RX ORDER — ALBUTEROL SULFATE 0.83 MG/ML
2.5 SOLUTION RESPIRATORY (INHALATION) EVERY 4 HOURS PRN
Status: DISCONTINUED | OUTPATIENT
Start: 2022-03-22 | End: 2022-03-22 | Stop reason: HOSPADM

## 2022-03-22 RX ORDER — ONDANSETRON 4 MG/1
4 TABLET, ORALLY DISINTEGRATING ORAL
Status: DISCONTINUED | OUTPATIENT
Start: 2022-03-22 | End: 2022-03-23 | Stop reason: HOSPADM

## 2022-03-22 RX ORDER — OXYCODONE HYDROCHLORIDE 5 MG/1
5-10 TABLET ORAL EVERY 4 HOURS PRN
Qty: 20 TABLET | Refills: 0 | Status: SHIPPED | OUTPATIENT
Start: 2022-03-22 | End: 2023-02-16

## 2022-03-22 RX ORDER — LIDOCAINE 40 MG/G
CREAM TOPICAL
Status: DISCONTINUED | OUTPATIENT
Start: 2022-03-22 | End: 2022-03-22 | Stop reason: HOSPADM

## 2022-03-22 RX ORDER — FLUMAZENIL 0.1 MG/ML
0.2 INJECTION, SOLUTION INTRAVENOUS
Status: DISCONTINUED | OUTPATIENT
Start: 2022-03-22 | End: 2022-03-22 | Stop reason: HOSPADM

## 2022-03-22 RX ORDER — CEFAZOLIN SODIUM 2 G/50ML
2 SOLUTION INTRAVENOUS
Status: DISCONTINUED | OUTPATIENT
Start: 2022-03-22 | End: 2022-03-22 | Stop reason: HOSPADM

## 2022-03-22 RX ORDER — FENTANYL CITRATE 50 UG/ML
50 INJECTION, SOLUTION INTRAMUSCULAR; INTRAVENOUS EVERY 5 MIN PRN
Status: DISCONTINUED | OUTPATIENT
Start: 2022-03-22 | End: 2022-03-22 | Stop reason: HOSPADM

## 2022-03-22 RX ORDER — ACETAMINOPHEN 325 MG/1
975 TABLET ORAL ONCE
Status: DISCONTINUED | OUTPATIENT
Start: 2022-03-22 | End: 2022-03-22 | Stop reason: HOSPADM

## 2022-03-22 RX ORDER — OXYCODONE HYDROCHLORIDE 5 MG/1
5 TABLET ORAL
Status: DISCONTINUED | OUTPATIENT
Start: 2022-03-22 | End: 2022-03-23 | Stop reason: HOSPADM

## 2022-03-22 RX ADMIN — ACETAMINOPHEN 975 MG: 325 TABLET ORAL at 12:48

## 2022-03-22 RX ADMIN — PROPOFOL 200 MCG/KG/MIN: 10 INJECTION, EMULSION INTRAVENOUS at 14:20

## 2022-03-22 RX ADMIN — PROPOFOL 100 MCG/KG/MIN: 10 INJECTION, EMULSION INTRAVENOUS at 15:48

## 2022-03-22 RX ADMIN — PROPOFOL 100 MCG/KG/MIN: 10 INJECTION, EMULSION INTRAVENOUS at 14:54

## 2022-03-22 RX ADMIN — DEXAMETHASONE SODIUM PHOSPHATE 4 MG: 4 INJECTION, SOLUTION INTRA-ARTICULAR; INTRALESIONAL; INTRAMUSCULAR; INTRAVENOUS; SOFT TISSUE at 14:44

## 2022-03-22 RX ADMIN — PROPOFOL 200 MG: 10 INJECTION, EMULSION INTRAVENOUS at 14:21

## 2022-03-22 RX ADMIN — FENTANYL CITRATE 50 MCG: 50 INJECTION, SOLUTION INTRAMUSCULAR; INTRAVENOUS at 13:49

## 2022-03-22 RX ADMIN — GABAPENTIN 300 MG: 300 CAPSULE ORAL at 12:49

## 2022-03-22 RX ADMIN — Medication 0.4 MCG/KG/MIN: at 15:22

## 2022-03-22 RX ADMIN — CEFAZOLIN SODIUM 2 G: 2 SOLUTION INTRAVENOUS at 14:16

## 2022-03-22 RX ADMIN — SODIUM CHLORIDE, SODIUM LACTATE, POTASSIUM CHLORIDE, CALCIUM CHLORIDE 500 ML: 600; 310; 30; 20 INJECTION, SOLUTION INTRAVENOUS at 12:58

## 2022-03-22 RX ADMIN — SODIUM CHLORIDE, SODIUM LACTATE, POTASSIUM CHLORIDE, CALCIUM CHLORIDE: 600; 310; 30; 20 INJECTION, SOLUTION INTRAVENOUS at 15:35

## 2022-03-22 RX ADMIN — SODIUM CHLORIDE, SODIUM LACTATE, POTASSIUM CHLORIDE, CALCIUM CHLORIDE: 600; 310; 30; 20 INJECTION, SOLUTION INTRAVENOUS at 14:00

## 2022-03-22 RX ADMIN — BUPIVACAINE HYDROCHLORIDE 15 ML: 2.5 INJECTION, SOLUTION EPIDURAL; INFILTRATION; INTRACAUDAL at 14:10

## 2022-03-22 RX ADMIN — Medication 150 MCG: at 14:53

## 2022-03-22 RX ADMIN — Medication 0.4 MCG/KG/MIN: at 15:09

## 2022-03-22 RX ADMIN — Medication 100 MCG: at 14:30

## 2022-03-22 NOTE — ANESTHESIA PREPROCEDURE EVALUATION
Anesthesia Pre-Procedure Evaluation    Patient: Romi Cortez   MRN: [id number] : 1960        Procedure : Procedure(s):  Right arthroscopic cuff repair, subacromial decompression, biceps tenotomy          History reviewed. No pertinent past medical history.   Past Surgical History:   Procedure Laterality Date     D & C       LAPAROSCOPIC APPENDECTOMY  2013    Procedure: LAPAROSCOPIC APPENDECTOMY;  Laparoscopic Appendectomy;  Surgeon: Sung Rodriguez MD;  Location: [facility] OR      No Known Allergies   Social History     Tobacco Use     Smoking status: Never Smoker     Smokeless tobacco: Never Used   Substance Use Topics     Alcohol use: Yes     Comment: 2/week      Wt Readings from Last 1 Encounters:   22 99.8 kg (220 lb)        Anesthesia Evaluation   Pt has had prior anesthetic.     No history of anesthetic complications       ROS/MED HX  ENT/Pulmonary:     (+) sleep apnea,     Neurologic:       Cardiovascular:       METS/Exercise Tolerance:     Hematologic:       Musculoskeletal:       GI/Hepatic:       Renal/Genitourinary:       Endo:       Psychiatric/Substance Use:     (+) psychiatric history bipolar     Infectious Disease:       Malignancy:       Other:            Physical Exam    Airway        Mallampati: II       Respiratory Devices and Support         Dental           Cardiovascular          Rhythm and rate: regular     Pulmonary           breath sounds clear to auscultation           OUTSIDE LABS:  CBC:   Lab Results   Component Value Date    WBC 10.7 2013    HGB 14.2 2013    HCT 41.3 2013     2013     2013     BMP:   Lab Results   Component Value Date     2013    POTASSIUM 4.3 2013    CHLORIDE 100 2013    CO2 28 2013    BUN 14 2013    CR 0.57 2013    CR 0.70 2013     (H) 2013     COAGS: No results found for: PTT, INR, FIBR  POC: No results found for: BGM, HCG,  HCGS  HEPATIC:   Lab Results   Component Value Date    ALBUMIN 4.6 11/20/2013    PROTTOTAL 7.1 11/20/2013    ALT 52 (H) 11/20/2013    AST 39 11/20/2013    ALKPHOS 67 11/20/2013    BILITOTAL 0.4 11/20/2013     OTHER:   Lab Results   Component Value Date    KATELYN 9.3 11/20/2013       Anesthesia Plan    ASA Status:  2   NPO Status:  NPO Appropriate    Anesthesia Type: General.     - Airway: LMA   Induction: Intravenous.   Maintenance: TIVA.        Consents    Anesthesia Plan(s) and associated risks, benefits, and realistic alternatives discussed. Questions answered and patient/representative(s) expressed understanding.    - Discussed:     - Discussed with:  Patient         Postoperative Care    Pain management: Oral pain medications, Multi-modal analgesia, IV analgesics, Peripheral nerve block (Single Shot).   PONV prophylaxis: Ondansetron (or other 5HT-3), Dexamethasone or Solumedrol     Comments:           H&P reviewed: Unable to attach H&P to encounter due to EHR limitations. H&P Update: appropriate H&P reviewed, patient examined. No interval changes since H&P (within 30 days).         Ranjit Salgado MD

## 2022-03-22 NOTE — DISCHARGE INSTRUCTIONS
"[facility]  Home Care Following Anesthesia  For 24 hours after surgery:  1. Get plenty of rest.  A responsible adult must stay with you for at least 24 hours after you leave the surgery center.  2. Do not drive or use heavy equipment.  If you have weakness or tingling, don't drive or use heavy equipment until this feeling goes away.   3. Do not drink alcohol.   4. Avoid strenuous or risky activities.  Ask for help when climbing stairs.  5. You may feel lightheaded.  IF so, sit for a few minutes before standing.  Have someone help you get up.   6. If you have nausea (feel sick to your stomach): Drink only clear liquids such as apple juice, ginger ale, broth or 7-Up.  Rest may also help.  Be sure to drink enough fluids.  Move to a regular diet as you feel able.   7. You may have a slight fever.  Call the doctor if your fever is over 100 F (37.7 C) (taken under the tongue) or lasts longer than 24 hours.  8. You may have a dry mouth, a sore throat, muscle aches or trouble sleeping. These should go away after 24 hours.  9. Do not make important or legal decisions.   10. It is recommended to avoid smoking.        Today you received an Exparel block to numb the nerves near your surgery site.  This is a block using local anesthetic or \"numbing\" medication injected around the nerves to anesthetize or \"numb\" the area supplied by those nerves.  This block is injected into the muscle layer near your surgical site.  This medication may numb the location where you had surgery up to 72 hours.  If your surgical site is an arm or leg you should be careful with your affected limb, since it is possible to injure your limb without being aware of it due to the numbing.  Until full feeling returns, you should guard against bumping or hitting your limb, and avoid extreme hot or cold temperatures on the skin.  As the block wears off, the feeling will return as a tingling or prickly sensation near your " surgical site.  You will experince more discomfort from your incision as the feeling returns.  You may want to take a pain pill (a narcotic or Tylenol if this was prescribed by your surgeon) when you start to experience mild pain before the pain beomes more severe.  If your pain medications do not control your pain, you should notify your surgeon.    Tips for taking pain medications  To get the best pain relief possible, remember these points:    Take pain medications as directed, before pain becomes severe.    Pain medication can upset your stomach: taking it with food may help.    Constipation is a common side effect of pain medication. Drink plenty of  fluids.    Eat foods high in fiber. Take a stool softener if recommended by your doctor or pharmacist.    Do not drink alcohol, drive or operate machinery while taking pain medications.    Ask about other ways to control pain, such as with heat, ice or relaxation.    Tylenol/Acetaminophen Consumption  To help encourage the safe use of acetaminophen, the makers of TYLENOL  have lowered the maximum daily dose for single-ingredient Extra Strength TYLENOL  (acetaminophen) products sold in the [place] from 8 pills per day (4,000 mg) to 6 pills per day (3,000 mg). The dosing interval has also changed from 2 pills every 4-6 hours to 2 pills every 6 hours.    If you feel your pain relief is insufficient, you may take Tylenol/Acetaminophen in addition to your narcotic pain medication.     Be careful not to exceed 3,000 mg of Tylenol/Acetaminophen in a 24 hour period from all sources.    If you are taking extra strength Tylenol/acetaminophen (500 mg), the maximum dose is 6 tablets in 24 hours.    If you are taking regular strength acetaminophen (325 mg), the maximum dose is 9 tablets in 24 hours.    Call a doctor for any of the followin. Signs of infection (fever, growing tenderness at the surgery site, a large amount of drainage or bleeding, severe pain, foul-smelling  drainage, redness, swelling).  2. It has been over 8 to 10 hours since surgery and you are still not able to urinate (pass water).  3. Headache for over 24 hours.  4. Numbness, tingling or weakness the day after surgery (if you had spinal anesthesia).  5. Signs of Covid-19 infection (temperature over 100 degrees, shortness of breath, cough, loss of taste/smell, generalized body aches, persistent headache, chills, sore throat, nausea/vomiting/diarrhea)  Your doctor is:  Dr. Belinda Cazares, Orthopaedics: [phone]                  Or dial [phone] and ask for the resident on call for:  Orthopaedics  For emergency care, call the:  [facility] Emergency Department: [phone] (TTY for hearing impaired: [phone])

## 2022-03-22 NOTE — BRIEF OP NOTE
[facility] Brief Operative Note    Pre-operative diagnosis: Traumatic complete tear of right rotator cuff, subsequent encounter [S47.800H]   Post-operative diagnosis Same   Procedure: Procedure(s):  Right arthroscopic cuff repair, subacromial decompression, biceps tenotomy   Surgeon(s): Surgeon(s) and Role:     * Belinda Cazares MD - Primary   Estimated blood loss: 5ml   Specimens: * No specimens in log *   Findings: Full thickness supra tear (infra footprint intact)  4.75 knotless SL, 5.5 SL

## 2022-03-22 NOTE — ANESTHESIA POSTPROCEDURE EVALUATION
Patient: Romi Cortez    Procedure: Procedure(s):  Right arthroscopic cuff repair, subacromial decompression, biceps tenotomy       Anesthesia Type:  General    Note:  Disposition: Outpatient   Postop Pain Control: Uneventful            Sign Out: Well controlled pain   PONV: No   Neuro/Psych: Uneventful            Sign Out: Acceptable/Baseline neuro status   Airway/Respiratory: Uneventful            Sign Out: Acceptable/Baseline resp. status   CV/Hemodynamics: Uneventful            Sign Out: Acceptable CV status; No obvious hypovolemia; No obvious fluid overload   Other NRE: NONE   DID A NON-ROUTINE EVENT OCCUR? No           Last vitals:  Vitals Value Taken Time   /65 03/22/22 1630   Temp 35.8  C (96.4  F) 03/22/22 1630   Pulse 73 03/22/22 1630   Resp 18 03/22/22 1630   SpO2 97 % 03/22/22 1630       Electronically Signed By: Ranjit Salgado MD  March 22, 2022  5:13 PM

## 2022-03-22 NOTE — ANESTHESIA PROCEDURE NOTES
Brachial plexus Procedure Note    Pre-Procedure   Staff -        Anesthesiologist:  Ranjit Salgado MD       Performed By: anesthesiologist       Location: pre-op       Pre-Anesthestic Checklist: patient identified, IV checked, site marked, risks and benefits discussed, informed consent, monitors and equipment checked, pre-op evaluation, at physician/surgeon's request and post-op pain management  Timeout:       Correct Patient: Yes        Correct Procedure: Yes        Correct Site: Yes        Correct Position: Yes        Correct Laterality: Yes        Site Marked: Yes  Procedure Documentation  Procedure: Brachial plexus       Diagnosis: POST OPERATIVE PAIN       Laterality: right       Patient Position: sitting       Patient Prep/Sterile Barriers: sterile gloves, mask       Skin prep: Chloraprep (interscalene approach).       Needle Type: short bevel       Needle Gauge: 21.        Needle Length (millimeters): 110        Ultrasound guided       1. Ultrasound was used to identify targeted nerve, plexus, vascular marker, or fascial plane and place a needle adjacent to it in real-time.       2. Ultrasound was used to visualize the spread of anesthetic in close proximity to the above referenced structure.       3. A permanent image is entered into the patient's record.    Assessment/Narrative         The placement was negative for: blood aspirated, painful injection and site bleeding       Paresthesias: No.     Bolus given via needle..        Secured via.        Insertion/Infusion Method: Single Shot       Complications: none       Injection made incrementally with aspirations every 5 mL.    Medication(s) Administered   Bupivacaine 0.25% PF (Infiltration), 15 mL  Bupivacaine liposome (Exparel) 1.3% LA inj susp (Infiltration), 10 mL  Medication Administration Time: 3/22/2022 2:10 PM     Comments:  133 mg exparel used

## 2022-03-22 NOTE — OR NURSING
Patient received right side Interscalene nerve block  with Exparel.  Fentanyl 50mcg and Versed 1mg given. Tolerated procedure well.

## 2022-03-27 NOTE — OP NOTE
DATE OF PROCEDURE: 3/22/2022     PREOPERATIVE DIAGNOSES:   1. Right rotator cuff tear.   2. Right long head biceps disease.   3. Right subacromial bursitis.     POSTOPERATIVE DIAGNOSES:   1. Right rotator cuff tear  2. Right long head biceps disease.  3. Right subacromial bursitis     PROCEDURES:   1. Right arthroscopic rotator cuff repair.   2. Right arthroscopic glenohumeral debridement, extensive  3. Right subacromial bursectomy without bony acromioplasty.     STAFF SURGEON: Belinda Cazares MD   ASSISTANT: Sohail Gautam MD; Pato Lim MD    ANESTHESIA: General endotracheal anesthesia with supplementary interscalene block.   ESTIMATED BLOOD LOSS: 5 mL.     IMPLANTS: Arthrex knotless 4.75 Bio-SwiveLock x 1 and 5.5 SwiveLock.   COMPLICATIONS: None.     BRIEF PATIENT HISTORY: Romi Cortez is a 62 year old female I have seen in clinic. Please refer to that documentation. She has an MRI which demonstrated a full thickness tear involving the supraspinatus. The patient has had nonoperative management but she has not had adequate lasting relief of pain and is at this time having lifestyle limiting symptoms. She wishes at this time to proceed with surgical intervention. We had discussed the risks and benefits of both non-operative and surgical management. We discussed the expected postoperative restrictions. We discussed the risks of surgery including failure of the cuff to heal or risk of retear, risk of being no better or even worse if she  became stiff, infected, had an injury to the nerves or arteries which power the arm or hand or had a reaction to anesthesia. We discussed the postoperative rehabilitation course. The patient wished to proceed with surgery and informed consent was completed.    DESCRIPTION OF PROCEDURE: The patient was identified in the preoperative area and the correct right shoulder was marked for surgery. The patient was provided an interscalene block by our Anesthesia colleagues and was  taken to the operating room where she was surrendered to general endotracheal anesthesia. The patient was moved to the operating table in the beachchair position with all bony prominences well padded. The head was placed in a head carrasco with the neck in neutral position. The right upper extremity was prepped and draped in the usual sterile fashion. A timeout was held in accordance with hospital policy, confirming correct patient, side, site, procedure and administration of IV antibiotics prior to incision.   I initiated shoulder arthroscopy through a posterior viewing portal. I created an anterior working portal under direct visualization. I performed a diagnostic arthroscopy. There was fraying of the superior labrum with a tear involving the biceps anchor. There was synovitis throughout. The subscapularis was intact. There was a full thickness tear of the supraspinatus. The infraspinatus and teres were intact. There were no loose bodies in the axillary pouch and the chondral surfaces were preserved.   I tenotomized the long head of the biceps and allowed it to retract out of the shoulder. I debrided the stump back to a stable edge. I debrided the anterior, superior, and posterior labrum. I then moved to the subacromial space and performed a thorough subacromial bursectomy. Upon entering the subacromial space, massive and significant bursitis was encountered. I debrided this with a shaver and an ablator. I denuded the undersurface of all soft tissues including the CA ligament.   Next I examined the cuff tear. The infraspinatus footprint was entirely intact. The supra footprint was bare with a full thickness supra tear. I debrided the greater tuberosity at the tear footprint. I placed a 4.75 Bio-SwiveLock anchor and passed the Tapes in mattress fashion. I passed the repair stitch and shuttle stitch in mattress fashion centrally and then shuttled the repair stitch back through the anchor. The FiberTapes were then  brought laterally to 1 5.5 Bio-SwiveLock anchor. This nicely reapproximated the tendon with an anatomic repair.     All instruments were removed from the shoulder and then portals were closed with interrupted 3-0 Monocryl. Steri-Strips and a soft sterile dressing were applied. The arm was placed into an abduction sling and the patient was extubated and transported to the recovery room in stable condition. There were no apparent intraoperative complications.     POSTOPERATIVE PLAN:   1. The patient will be discharged to home when she meets Same Day discharge criteria.   2. The patient will have no range of motion of the shoulder for 2 weeks but can do active hand, wrist and elbow range of motion.   3. At 2 weeks, the patient will start passive ROM and at 4-6 weeks active assisted range of motion.  She will progress to active range of motion at 6-8 weeks with strengthening at 8-10 weeks.     BETZAIDA LANCASTER MD

## 2022-03-29 ENCOUNTER — TELEPHONE (OUTPATIENT)
Dept: ORTHOPEDICS | Facility: CLINIC | Age: 62
End: 2022-03-29
Payer: COMMERCIAL

## 2022-03-29 NOTE — TELEPHONE ENCOUNTER
Patient was called to reschedule her appointment with Dr. Cazares.  She was offered and accepted an appointment on 3/31/22 at .  She has the location and address.  She stated that she is experiencing almost no pain and is doing well.  She will call back with any questions.

## 2022-03-31 ENCOUNTER — OFFICE VISIT (OUTPATIENT)
Dept: ORTHOPEDICS | Facility: CLINIC | Age: 62
End: 2022-03-31
Payer: COMMERCIAL

## 2022-03-31 DIAGNOSIS — S46.011D TRAUMATIC COMPLETE TEAR OF RIGHT ROTATOR CUFF, SUBSEQUENT ENCOUNTER: Primary | ICD-10-CM

## 2022-03-31 PROCEDURE — 99024 POSTOP FOLLOW-UP VISIT: CPT | Performed by: ORTHOPAEDIC SURGERY

## 2022-03-31 NOTE — LETTER
3/31/2022         RE: Romi Cortez  [address] [facility] [address]        Dear Colleague,    Thank you for referring your patient, Romi Cortez, to the [facility]. Please see a copy of my visit note below.    CHIEF CONCERN: Status post Right arthroscopic rotator cuff repair, subacromial bursectomy  DATE OF SURGERY: 3/22/22    HISTORY OF PRESENT ILLNESS: Ms. Cortez is a pleasant 62 year-old woman who is 9 days status post the above procedure. She is managing well postop. She denies complications or concerns.    EXAM:  Pleasant adult female in NAD  Respirations even and unlabored.  Right upper extremity: Incisions clean, dry, and intact. Distally neurovascularly intact without deficits. Shoulder range of motion not tested due to postop restrictions.    ASSESSMENT:  1. Nine days status post above procedure    PLAN:    Arthroscopic images and intra-operative findings reviewed    Range of Motion:     Hand, wrist and elbow ROM    Shoulder range of motion to begin per Op note    Sling: On at all times except for bathing or dressing    Pain medication: Reviewed. NSAIDs OK.     Follow up: 4 weeks with no new radiographs needed.           Again, thank you for allowing me to participate in the care of your patient.        Sincerely,        Belinda Cazares MD
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
## 2022-03-31 NOTE — PROGRESS NOTES
CHIEF CONCERN: Status post Right arthroscopic rotator cuff repair, subacromial bursectomy  DATE OF SURGERY: 3/22/22    HISTORY OF PRESENT ILLNESS: Ms. Cortez is a pleasant 62 year-old woman who is 9 days status post the above procedure. She is managing well postop. She denies complications or concerns.    EXAM:  Pleasant adult female in NAD  Respirations even and unlabored.  Right upper extremity: Incisions clean, dry, and intact. Distally neurovascularly intact without deficits. Shoulder range of motion not tested due to postop restrictions.    ASSESSMENT:  1. Nine days status post above procedure    PLAN:    Arthroscopic images and intra-operative findings reviewed    Range of Motion:     Hand, wrist and elbow ROM    Shoulder range of motion to begin per Op note    Sling: On at all times except for bathing or dressing    Pain medication: Reviewed. NSAIDs OK.     Follow up: 4 weeks with no new radiographs needed.

## 2022-04-06 ENCOUNTER — TELEPHONE (OUTPATIENT)
Dept: ORTHOPEDICS | Facility: CLINIC | Age: 62
End: 2022-04-06

## 2022-04-06 NOTE — TELEPHONE ENCOUNTER
[facility] Call Center    Phone Message    May a detailed message be left on voicemail: yes     Reason for Call: Other: patients velcro on her sling is not longer sticking,  and the strap that's not sticking is the main strap holding her arm up     Action Taken: Message routed to:  [facility] ortho    Travel Screening: Not Applicable     Please c/b to discuss

## 2022-04-06 NOTE — TELEPHONE ENCOUNTER
Patient came in to the clinic and her sling was adjusted and taken care of.  She stated that she had been adjusting the velcro on her sling and it was no longer sticking, to the point that it was unsticking and she would have to catch her arm.  She was told to not adjust the velcro and that her sling is in the correct position.  She was agreeable with this plan and had no other questions.

## 2022-04-13 ENCOUNTER — THERAPY VISIT (OUTPATIENT)
Dept: PHYSICAL THERAPY | Facility: CLINIC | Age: 62
End: 2022-04-13
Payer: COMMERCIAL

## 2022-04-13 DIAGNOSIS — S46.011D TRAUMATIC COMPLETE TEAR OF RIGHT ROTATOR CUFF, SUBSEQUENT ENCOUNTER: ICD-10-CM

## 2022-04-13 PROCEDURE — 97140 MANUAL THERAPY 1/> REGIONS: CPT | Mod: GP | Performed by: PHYSICAL THERAPIST

## 2022-04-13 PROCEDURE — 97110 THERAPEUTIC EXERCISES: CPT | Mod: GP | Performed by: PHYSICAL THERAPIST

## 2022-04-13 PROCEDURE — 97016 VASOPNEUMATIC DEVICE THERAPY: CPT | Mod: GP | Performed by: PHYSICAL THERAPIST

## 2022-04-13 PROCEDURE — 97161 PT EVAL LOW COMPLEX 20 MIN: CPT | Mod: GP | Performed by: PHYSICAL THERAPIST

## 2022-04-13 NOTE — PROGRESS NOTES
Physical Therapy Initial Evaluation: Subjective History  Date of Surgery: 3/22/22.    Surgical Procedure/Limb: arthroscopy rotator cuff repair R shoulder  Surgeon Name: Dr. Cazares  Average Daily Pain Levels: 4/10 (Location: R shoulder; Quality: Sharp)  Other Symptoms: aching when laying on back   Symptom Mgmt Strategies: pain meds  Prior orthopaedic history/procedures: n/a  Prior non-operative management: n/a  Next MD Appt Date: 5/4/22    Functional limitations following procedure: painful getting dressed, bathing, and reaching   Previous level of function: no restrictions     Patient Employment:   Typical Physical Activities: bike riding    Post-Operative Physical Therapy Examination      Anthropometric Measures     Right Left Difference   Joint ROM      Flexion 100 deg 180 deg 80 deg   External Rotation at neutral 35 deg 90 deg 45 deg   Abduction 90 deg 180 deg 90 deg        Right Left   Deltoid Muscle Activation NT normal     Status of Incision: not observed      Assessment/Plan      Patient is a 62 year old female with right side shoulder complaints.    Patient has the following significant findings with corresponding treatment plan.                Diagnosis 1:  S/p rotator cuff repair  Pain -  hot/cold therapy  Decreased ROM/flexibility - manual therapy and therapeutic exercise  Inflammation - cold therapy    Therapy Evaluation Codes:   1) History comprised of:   Personal factors that impact the plan of care:      Age.    Comorbidity factors that impact the plan of care are:      None.     Medications impacting care: None.  2) Examination of Body Systems comprised of:   Body structures and functions that impact the plan of care:      Shoulder.   Activity limitations that impact the plan of care are:      Bathing, Driving, Dressing, Walking, Working and Laying down.  3) Clinical presentation characteristics are:   Stable/Uncomplicated.  4) Decision-Making    Low complexity using standardized  patient assessment instrument and/or measureable assessment of functional outcome.  Cumulative Therapy Evaluation is: Low complexity.    Previous and current functional limitations:  (See Goal Flow Sheet for this information)    Short term and Long term goals: (See Goal Flow Sheet for this information)     Communication ability:  Patient appears to be able to clearly communicate and understand verbal and written communication and follow directions correctly.  Treatment Explanation - The following has been discussed with the patient:   RX ordered/plan of care  Anticipated outcomes  Possible risks and side effects  This patient would benefit from PT intervention to resume normal activities.   Rehab potential is excellent.    Frequency:  1 X week, once daily  Duration:  for 8 weeks  Discharge Plan:  Achieve all LTG.  Independent in home treatment program.  Return to work with or without restrictions.  Return to previous functional level by discharge.  Reach maximal therapeutic benefit.    Please refer to the daily flowsheet for treatment today, total treatment time and time spent performing 1:1 timed codes.

## 2022-04-19 NOTE — PROGRESS NOTES
[facility]    OUTPATIENT Physical Therapy ORTHOPEDIC EVALUATION  PLAN OF TREATMENT FOR OUTPATIENT REHABILITATION  (COMPLETE FOR INITIAL CLAIMS ONLY)  Patient's Last Name, First Name, M.I.  YOB: 1960  Romi Cortez    Provider s Name:  [facility]   Medical Record No.  [id number]   Start of Care Date:  04/13/22   Onset Date:  03/22/22    Type:     _X__PT   ___OT Medical Diagnosis:    Encounter Diagnosis   Name Primary?    Traumatic complete tear of right rotator cuff, subsequent encounter         Treatment Diagnosis:  s/p RCR        Goals:     04/13/22 0500   Body Part   Goals listed below are for shoulder   Goal #1   Goal #1 ROM only ordered   Previous Functional Level No restrictions   Current Functional Level Cannot reach ;overhead;out to the side   Performance level flex: 110, abd 90, ER 35   STG Target Performance Reach ;overhead;out to the side   Performance level >120 each direction   Rationale for dressing   Due date 05/04/22   LTG Target Performance Reach;overhead;out to the side   Performance Level >160 each direction   Rationale for dressing   Due date 05/25/22       Therapy Frequency:  1x/week  Predicted Duration of Therapy Intervention:  12 weeks    Mayra Altamirano, PT                 I CERTIFY THE NEED FOR THESE SERVICES FURNISHED UNDER        THIS PLAN OF TREATMENT AND WHILE UNDER MY CARE     (Physician co-signature of this document indicates review and certification of the therapy plan).                     Certification Date From:  04/13/22   Certification Date To:  07/12/22    Referring Provider:  Belinda Cazares    Initial Assessment        See Epic Evaluation SOC Date: 04/13/22

## 2022-04-20 ENCOUNTER — THERAPY VISIT (OUTPATIENT)
Dept: PHYSICAL THERAPY | Facility: CLINIC | Age: 62
End: 2022-04-20
Payer: COMMERCIAL

## 2022-04-20 DIAGNOSIS — S46.011D TRAUMATIC COMPLETE TEAR OF RIGHT ROTATOR CUFF, SUBSEQUENT ENCOUNTER: Primary | ICD-10-CM

## 2022-04-20 PROCEDURE — 97110 THERAPEUTIC EXERCISES: CPT | Mod: GP | Performed by: PHYSICAL THERAPIST

## 2022-04-20 PROCEDURE — 97530 THERAPEUTIC ACTIVITIES: CPT | Mod: GP | Performed by: PHYSICAL THERAPIST

## 2022-04-20 PROCEDURE — 97016 VASOPNEUMATIC DEVICE THERAPY: CPT | Mod: GP | Performed by: PHYSICAL THERAPIST

## 2022-04-26 ENCOUNTER — THERAPY VISIT (OUTPATIENT)
Dept: PHYSICAL THERAPY | Facility: CLINIC | Age: 62
End: 2022-04-26
Payer: COMMERCIAL

## 2022-04-26 DIAGNOSIS — S46.011D TRAUMATIC COMPLETE TEAR OF RIGHT ROTATOR CUFF, SUBSEQUENT ENCOUNTER: Primary | ICD-10-CM

## 2022-04-26 PROCEDURE — 97110 THERAPEUTIC EXERCISES: CPT | Mod: GP | Performed by: PHYSICAL THERAPIST

## 2022-04-26 PROCEDURE — 97016 VASOPNEUMATIC DEVICE THERAPY: CPT | Mod: GP | Performed by: PHYSICAL THERAPIST

## 2022-04-26 PROCEDURE — 97140 MANUAL THERAPY 1/> REGIONS: CPT | Mod: GP | Performed by: PHYSICAL THERAPIST

## 2022-05-02 ENCOUNTER — THERAPY VISIT (OUTPATIENT)
Dept: PHYSICAL THERAPY | Facility: CLINIC | Age: 62
End: 2022-05-02
Payer: COMMERCIAL

## 2022-05-02 DIAGNOSIS — M25.511 RIGHT SHOULDER PAIN: ICD-10-CM

## 2022-05-02 DIAGNOSIS — S46.011D TRAUMATIC COMPLETE TEAR OF RIGHT ROTATOR CUFF, SUBSEQUENT ENCOUNTER: Primary | ICD-10-CM

## 2022-05-02 PROCEDURE — 97110 THERAPEUTIC EXERCISES: CPT | Mod: GP | Performed by: PHYSICAL THERAPIST

## 2022-05-02 PROCEDURE — 97016 VASOPNEUMATIC DEVICE THERAPY: CPT | Mod: GP | Performed by: PHYSICAL THERAPIST

## 2022-05-02 NOTE — PROGRESS NOTES
Subjective:  HPI  Physical Exam                    Objective:  System    Physical Exam    General     ROS    Assessment/Plan:    PROGRESS  REPORT    Progress reporting period is from 4/13/22 to 5/2/22.       SUBJECTIVE  Subjective: shoulder is feeling better, sometimes wakes up d/t pain but not frequently. Feels sore when she bumps her shoulder or lifts coffee mug    Current Pain level: 0/10.     Previous pain level was  1/10  .   Changes in function:  Yes (See Goal flowsheet attached for changes in current functional level)  Adverse reaction to treatment or activity: None    OBJECTIVE  Objective: PROM: flex 140, abd 90, ER 25. Tried AROM elevation, somewhat painful and patient uses neck accessory muscles with active elevation, increased pain when trying while keeping scapula retracted/depressed     ASSESSMENT/PLAN  Updated problem list and treatment plan: Diagnosis 1:  S/p R rotator cuff repair  Pain -  hot/cold therapy  Decreased ROM/flexibility - manual therapy and therapeutic exercise  Decreased function - therapeutic activities  STG/LTGs have been met or progress has been made towards goals:  Yes (See Goal flow sheet completed today.)  Assessment of Progress: The patient's condition is improving.  Self Management Plans:  Patient has been instructed in a home treatment program.  Patient is independent in a home treatment program.  Patient is independent in self management of symptoms.  I have re-evaluated this patient and find that the nature, scope, duration and intensity of the therapy is appropriate for the medical condition of the patient.  Romi continues to require the following intervention to meet STG and LTG's:  PT    Recommendations:  Frequency: 1x/week  Duration: 8 weeks    Please refer to the daily flowsheet for treatment today, total treatment time and time spent performing 1:1 timed codes.

## 2022-05-11 ENCOUNTER — THERAPY VISIT (OUTPATIENT)
Dept: PHYSICAL THERAPY | Facility: CLINIC | Age: 62
End: 2022-05-11
Payer: COMMERCIAL

## 2022-05-11 ENCOUNTER — OFFICE VISIT (OUTPATIENT)
Dept: ORTHOPEDICS | Facility: CLINIC | Age: 62
End: 2022-05-11
Payer: COMMERCIAL

## 2022-05-11 DIAGNOSIS — S46.011D TRAUMATIC COMPLETE TEAR OF RIGHT ROTATOR CUFF, SUBSEQUENT ENCOUNTER: Primary | ICD-10-CM

## 2022-05-11 PROCEDURE — 97016 VASOPNEUMATIC DEVICE THERAPY: CPT | Mod: GP | Performed by: PHYSICAL THERAPIST

## 2022-05-11 PROCEDURE — 99024 POSTOP FOLLOW-UP VISIT: CPT | Performed by: ORTHOPAEDIC SURGERY

## 2022-05-11 PROCEDURE — 97110 THERAPEUTIC EXERCISES: CPT | Mod: GP | Performed by: PHYSICAL THERAPIST

## 2022-05-11 NOTE — PROGRESS NOTES
CHIEF CONCERN: Status post Right arthroscopic rotator cuff repair, subacromial bursectomy  DATE OF SURGERY: 3/22/22    HISTORY OF PRESENT ILLNESS: Ms. Cortez is a pleasant 62 year-old woman who is six weeks status post the above procedure. She is managing well postop. She denies complications or concerns. She is progressing as expected in PT with Katelin. No significant concerns.    EXAM:  Pleasant adult female in NAD  Respirations even and unlabored.  Right upper extremity: Incisions clean, dry, and intact. Distally neurovascularly intact without deficits. Shoulder range of motion not tested due to postop restrictions.    ASSESSMENT:  1. Six weeks status post above procedure    PLAN:    Range of Motion: Progress ROM of the shoulder with physical therapy per operative note.     Sling: Wean from sling at this time.     Pain medication: NSAIDs and Tylenol PRN    Work: Return to work reviewed and note provided as needed    Follow up: 6 weeks with no new radiographs needed. Plan to advance to strengthening at 3 months pending clinical follow up.
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
## 2022-05-11 NOTE — LETTER
5/11/2022       RE: Romi Cortez  [address] [facility] [id number]    Dear Colleague,    Thank you for referring your patient, Romi Cortez, to the [facility] [place]. Please see a copy of my visit note below.    CHIEF CONCERN: Status post Right arthroscopic rotator cuff repair, subacromial bursectomy  DATE OF SURGERY: 3/22/22  HISTORY OF PRESENT ILLNESS: Ms. Cortez is a pleasant 62 year-old woman who is six weeks status post the above procedure. She is managing well postop. She denies complications or concerns. She is progressing as expected in PT with Katelin. No significant concerns.  EXAM:  Pleasant adult female in NAD  Respirations even and unlabored.  Right upper extremity: Incisions clean, dry, and intact. Distally neurovascularly intact without deficits. Shoulder range of motion not tested due to postop restrictions.  ASSESSMENT:  1. Six weeks status post above procedure  PLAN:    Range of Motion: Progress ROM of the shoulder with physical therapy per operative note.     Sling: Wean from sling at this time.     Pain medication: NSAIDs and Tylenol PRN    Work: Return to work reviewed and note provided as needed    Follow up: 6 weeks with no new radiographs needed. Plan to advance to strengthening at 3 months pending clinical follow up.     Belinda Cazares MD

## 2022-05-23 ENCOUNTER — THERAPY VISIT (OUTPATIENT)
Dept: PHYSICAL THERAPY | Facility: CLINIC | Age: 62
End: 2022-05-23
Payer: COMMERCIAL

## 2022-05-23 DIAGNOSIS — S46.011D TRAUMATIC COMPLETE TEAR OF RIGHT ROTATOR CUFF, SUBSEQUENT ENCOUNTER: Primary | ICD-10-CM

## 2022-05-23 PROCEDURE — 97110 THERAPEUTIC EXERCISES: CPT | Mod: GP | Performed by: PHYSICAL THERAPIST

## 2022-06-10 ENCOUNTER — THERAPY VISIT (OUTPATIENT)
Dept: PHYSICAL THERAPY | Facility: CLINIC | Age: 62
End: 2022-06-10
Payer: COMMERCIAL

## 2022-06-10 DIAGNOSIS — S46.011D TRAUMATIC COMPLETE TEAR OF RIGHT ROTATOR CUFF, SUBSEQUENT ENCOUNTER: Primary | ICD-10-CM

## 2022-06-10 PROCEDURE — 97112 NEUROMUSCULAR REEDUCATION: CPT | Mod: GP | Performed by: PHYSICAL THERAPIST

## 2022-06-10 PROCEDURE — 97110 THERAPEUTIC EXERCISES: CPT | Mod: GP | Performed by: PHYSICAL THERAPIST

## 2022-06-22 ENCOUNTER — OFFICE VISIT (OUTPATIENT)
Dept: ORTHOPEDICS | Facility: CLINIC | Age: 62
End: 2022-06-22
Payer: COMMERCIAL

## 2022-06-22 DIAGNOSIS — S46.011D TRAUMATIC COMPLETE TEAR OF RIGHT ROTATOR CUFF, SUBSEQUENT ENCOUNTER: Primary | ICD-10-CM

## 2022-06-22 PROCEDURE — 99212 OFFICE O/P EST SF 10 MIN: CPT | Mod: GC | Performed by: ORTHOPAEDIC SURGERY

## 2022-06-22 NOTE — LETTER
6/22/2022         RE: Romi Cortez  [address] [facility] [address]        Dear Colleague,    Thank you for referring your patient, Romi Cortez, to the [facility] [place]. Please see a copy of my visit note below.    CHIEF CONCERN: Status post Right arthroscopic rotator cuff repair, subacromial bursectomy  DATE OF SURGERY: 3/22/22    HISTORY OF PRESENT ILLNESS: Patient is a pleasant 62 year-old female who is 3 months status post the above procedure.  She is doing well, pain is well controlled.  Pain is much improved from preop.  She is now able to sleep at night without pain.  She denies complications or concerns. She is progressing as expected in PT with Katelin.  She is working on strengthening.  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  No significant concerns.    EXAM:  Pleasant adult female in NAD  Respirations even and unlabored.  Right upper extremity: Incisions well approximated without erythema or drainage.  Forward elevation to 180, external rotation to 80, internal rotation to T12.  Sensation intact to light touch in axillary, median, radial, ulnar nerve distributions.  Fires FPL, EPL, IO.  2+ radial pulse.    ASSESSMENT:  1.  3 months status post above procedure    PLAN:    Activity:     Range of motion as tolerated    Strengthening as tolerated    Activities as tolerated with expectation of some limitations due to weakness. Plan to progress as symptoms allow.     Follow up: 3 months with no new radiographs needed        Bia Ritter MD  Orthopedic Surgery PGY-1    I have personally examined this patient and have reviewed the clinical presentation and progress note with the resident.  I agree with the treatment plan as outlined.  The plan was formulated with the resident on the day of the resident's note.     Belinda Cazares MD
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
## 2022-06-22 NOTE — PROGRESS NOTES
CHIEF CONCERN: Status post Right arthroscopic rotator cuff repair, subacromial bursectomy  DATE OF SURGERY: 3/22/22    HISTORY OF PRESENT ILLNESS: Patient is a pleasant 62 year-old female who is 3 months status post the above procedure.  She is doing well, pain is well controlled.  Pain is much improved from preop.  She is now able to sleep at night without pain.  She denies complications or concerns. She is progressing as expected in PT with Katelin.  She is working on strengthening.  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  No significant concerns.    EXAM:  Pleasant adult female in NAD  Respirations even and unlabored.  Right upper extremity: Incisions well approximated without erythema or drainage.  Forward elevation to 180, external rotation to 80, internal rotation to T12.  Sensation intact to light touch in axillary, median, radial, ulnar nerve distributions.  Fires FPL, EPL, IO.  2+ radial pulse.    ASSESSMENT:  1.  3 months status post above procedure    PLAN:    Activity:     Range of motion as tolerated    Strengthening as tolerated    Activities as tolerated with expectation of some limitations due to weakness. Plan to progress as symptoms allow.     Follow up: 3 months with no new radiographs needed        Bia Ritter MD  Orthopedic Surgery PGY-1    I have personally examined this patient and have reviewed the clinical presentation and progress note with the resident.  I agree with the treatment plan as outlined.  The plan was formulated with the resident on the day of the resident's note.

## 2022-06-27 ENCOUNTER — THERAPY VISIT (OUTPATIENT)
Dept: PHYSICAL THERAPY | Facility: CLINIC | Age: 62
End: 2022-06-27
Payer: COMMERCIAL

## 2022-06-27 DIAGNOSIS — S46.011D TRAUMATIC COMPLETE TEAR OF RIGHT ROTATOR CUFF, SUBSEQUENT ENCOUNTER: Primary | ICD-10-CM

## 2022-06-27 PROCEDURE — 97110 THERAPEUTIC EXERCISES: CPT | Mod: GP | Performed by: PHYSICAL THERAPIST

## 2022-06-27 PROCEDURE — 97140 MANUAL THERAPY 1/> REGIONS: CPT | Mod: GP | Performed by: PHYSICAL THERAPIST

## 2022-06-27 PROCEDURE — 97112 NEUROMUSCULAR REEDUCATION: CPT | Mod: GP | Performed by: PHYSICAL THERAPIST

## 2022-07-20 ENCOUNTER — THERAPY VISIT (OUTPATIENT)
Dept: PHYSICAL THERAPY | Facility: CLINIC | Age: 62
End: 2022-07-20
Payer: COMMERCIAL

## 2022-07-20 DIAGNOSIS — S46.011D TRAUMATIC COMPLETE TEAR OF RIGHT ROTATOR CUFF, SUBSEQUENT ENCOUNTER: Primary | ICD-10-CM

## 2022-07-20 PROCEDURE — 97140 MANUAL THERAPY 1/> REGIONS: CPT | Mod: GP | Performed by: PHYSICAL THERAPIST

## 2022-07-20 PROCEDURE — 97110 THERAPEUTIC EXERCISES: CPT | Mod: GP | Performed by: PHYSICAL THERAPIST

## 2022-07-20 PROCEDURE — 97112 NEUROMUSCULAR REEDUCATION: CPT | Mod: GP | Performed by: PHYSICAL THERAPIST

## 2022-08-18 ENCOUNTER — THERAPY VISIT (OUTPATIENT)
Dept: PHYSICAL THERAPY | Facility: CLINIC | Age: 62
End: 2022-08-18
Payer: COMMERCIAL

## 2022-08-18 DIAGNOSIS — S46.011D TRAUMATIC COMPLETE TEAR OF RIGHT ROTATOR CUFF, SUBSEQUENT ENCOUNTER: Primary | ICD-10-CM

## 2022-08-18 PROCEDURE — 97530 THERAPEUTIC ACTIVITIES: CPT | Mod: GP | Performed by: PHYSICAL THERAPIST

## 2022-08-18 PROCEDURE — 97112 NEUROMUSCULAR REEDUCATION: CPT | Mod: 59 | Performed by: PHYSICAL THERAPIST

## 2022-08-18 PROCEDURE — 97110 THERAPEUTIC EXERCISES: CPT | Mod: 59 | Performed by: PHYSICAL THERAPIST

## 2022-09-09 ENCOUNTER — THERAPY VISIT (OUTPATIENT)
Dept: PHYSICAL THERAPY | Facility: CLINIC | Age: 62
End: 2022-09-09
Payer: COMMERCIAL

## 2022-09-09 DIAGNOSIS — S46.011D TRAUMATIC COMPLETE TEAR OF RIGHT ROTATOR CUFF, SUBSEQUENT ENCOUNTER: Primary | ICD-10-CM

## 2022-09-09 PROCEDURE — 97530 THERAPEUTIC ACTIVITIES: CPT | Mod: GP | Performed by: PHYSICAL THERAPIST

## 2022-09-09 PROCEDURE — 97110 THERAPEUTIC EXERCISES: CPT | Mod: GP | Performed by: PHYSICAL THERAPIST

## 2022-10-20 ENCOUNTER — HOSPITAL ENCOUNTER (OUTPATIENT)
Facility: CLINIC | Age: 62
End: 2022-10-20
Attending: INTERNAL MEDICINE | Admitting: INTERNAL MEDICINE
Payer: COMMERCIAL

## 2022-10-20 ENCOUNTER — TRANSCRIBE ORDERS (OUTPATIENT)
Dept: OTHER | Age: 62
End: 2022-10-20

## 2022-10-20 ENCOUNTER — TELEPHONE (OUTPATIENT)
Dept: GASTROENTEROLOGY | Facility: CLINIC | Age: 62
End: 2022-10-20

## 2022-10-20 DIAGNOSIS — G89.29 CHRONIC BILATERAL LOW BACK PAIN WITH RIGHT-SIDED SCIATICA: Primary | ICD-10-CM

## 2022-10-20 DIAGNOSIS — R06.83 SNORING: Primary | ICD-10-CM

## 2022-10-20 DIAGNOSIS — Z12.11 COLON CANCER SCREENING: Primary | ICD-10-CM

## 2022-10-20 DIAGNOSIS — M54.41 CHRONIC BILATERAL LOW BACK PAIN WITH RIGHT-SIDED SCIATICA: Primary | ICD-10-CM

## 2022-10-20 NOTE — TELEPHONE ENCOUNTER
Screening Questions  BLUE  KIND OF PREP RED  LOCATION [review exclusion criteria] GREEN  SEDATION TYPE        Yes Are you active on mychart?       [place] Ordering/Referring Provider?         What type of coverage do you have?      N Have you had a positive covid test in the last 90 days?     35.5 1. BMI  [BMI 40+ - review exclusion criteria]    Yes  2. Are you able to give consent for your medical care? [IF NO,RN REVIEW]        N  3. Are you taking any prescription pain medications on a routine schedule?      NA  3a. EXTENDED PREP What kind of prescription?     N 4. Do you have any chemical dependencies such as alcohol, street drugs, or methadone?    N 5. Do you have any history of post-traumatic stress syndrome, severe anxiety or history of psychosis?      **If yes 3- 5 , please schedule with MAC sedation.**          IF YES TO ANY 6 - 10 - HOSPITAL SETTING ONLY.     N 6.   Do you need assistance transferring?     N 7.   Have you had a heart or lung transplant?    N 8.   Are you currently on dialysis?   N 9.   Do you use daily home oxygen?   N 10. Do you take nitroglycerin?   10a. NA If yes, how often?     11. [FEMALES]  NA Are you currently pregnant?    11a. NA If yes, how many weeks? [ Greater than 12 weeks, OR NEEDED]    N 12. Do you have Pulmonary Hypertension? *NEED PAC APPT AT [facility]*     N 13. [review exclusion criteria]  Do you have any implantable devices in your body (pacemaker, defib, LVAD)?    N 14. In the past 6 months, have you had any heart related issues including cardiomyopathy or heart attack?     14a. N If yes, did it require cardiac stenting if so when?     N 15. Have you had a stroke or Transient ischemic attack (TIA - aka  mini stroke ) within 6 months?       Yes16. Do you have mod to severe Obstructive Sleep Apnea?  [Hospital only - Ok at [facility]]    N 17. Do you have SEVERE AND UNCONTROLLED asthma? *NEED PAC APPT AT [facility]*     N 18. Are you currently taking any blood thinners?     " 18a. If yes, inform patient to \"follow up w/ ordering provider for bridging instructions.\"    N 19. Do you take the medication Phentermine?    19a. If yes, \"Hold for 7 days before procedure.  Please consult your prescribing provider if you have questions about holding this medication.\"     N  20. Do you have chronic kidney disease?      N  21. Do you have a diagnosis of diabetes?     N  22. On a regular basis do you go 3-5 days between bowel movements?      23. Preferred LOCAL Pharmacy for Pre Prescription    [ LIST ONLY ONE PHARMACY]        [facility][id number] - [place], [place] - [facility]        - CLOSING REMINDERS -    Informed patient they will need an adult    Cannot take any type of public or medical transportation alone    Conscious Sedation- Needs  for 6 hours after the procedure       MAC/General-Needs  for 24 hours after procedure    Pre-Procedure Covid test to be completed [[facility] PCR Testing Required]    Confirmed Nurse will call to complete assessment       - SCHEDULING DETAILS -     [place]  Surgeon    12/8/22  Date of Procedure  Lower Endoscopy [Colonoscopy]  Type of Procedure Scheduled   [facility] Location  [facility] PREP-If you answer yes to questions #8, #20, #21Which Colonoscopy Prep was Sent?     CS Sedation Type     NO PAC / Pre-op Required         Additional comments:            "

## 2022-10-22 ENCOUNTER — HEALTH MAINTENANCE LETTER (OUTPATIENT)
Age: 62
End: 2022-10-22

## 2022-11-04 ENCOUNTER — OFFICE VISIT (OUTPATIENT)
Dept: ORTHOPEDICS | Facility: CLINIC | Age: 62
End: 2022-11-04
Payer: COMMERCIAL

## 2022-11-04 ENCOUNTER — THERAPY VISIT (OUTPATIENT)
Dept: PHYSICAL THERAPY | Facility: CLINIC | Age: 62
End: 2022-11-04
Payer: COMMERCIAL

## 2022-11-04 ENCOUNTER — ANCILLARY PROCEDURE (OUTPATIENT)
Dept: GENERAL RADIOLOGY | Facility: CLINIC | Age: 62
End: 2022-11-04
Attending: STUDENT IN AN ORGANIZED HEALTH CARE EDUCATION/TRAINING PROGRAM
Payer: COMMERCIAL

## 2022-11-04 DIAGNOSIS — M25.561 RIGHT KNEE PAIN: ICD-10-CM

## 2022-11-04 DIAGNOSIS — S46.011D TRAUMATIC COMPLETE TEAR OF RIGHT ROTATOR CUFF, SUBSEQUENT ENCOUNTER: Primary | ICD-10-CM

## 2022-11-04 DIAGNOSIS — M25.561 KNEE PAIN, RIGHT: ICD-10-CM

## 2022-11-04 DIAGNOSIS — M17.11 OSTEOARTHRITIS OF RIGHT PATELLOFEMORAL JOINT: Primary | ICD-10-CM

## 2022-11-04 DIAGNOSIS — M17.11 PRIMARY LOCALIZED OSTEOARTHRITIS OF RIGHT KNEE: ICD-10-CM

## 2022-11-04 PROCEDURE — 97530 THERAPEUTIC ACTIVITIES: CPT | Mod: GP | Performed by: PHYSICAL THERAPIST

## 2022-11-04 PROCEDURE — 97161 PT EVAL LOW COMPLEX 20 MIN: CPT | Mod: GP | Performed by: PHYSICAL THERAPIST

## 2022-11-04 PROCEDURE — 73562 X-RAY EXAM OF KNEE 3: CPT | Mod: RT | Performed by: RADIOLOGY

## 2022-11-04 PROCEDURE — 99214 OFFICE O/P EST MOD 30 MIN: CPT | Performed by: STUDENT IN AN ORGANIZED HEALTH CARE EDUCATION/TRAINING PROGRAM

## 2022-11-04 RX ORDER — NAPROXEN 500 MG/1
500 TABLET ORAL 2 TIMES DAILY PRN
Qty: 28 TABLET | Refills: 0 | Status: SHIPPED | OUTPATIENT
Start: 2022-11-04 | End: 2022-11-18
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
## 2022-11-04 NOTE — LETTER
11/4/2022      RE: Romi Cortez  [address] [facility] [address]     Dear Colleague,    Thank you for referring your patient, Romi Cortez, to the [facility] [place]. Please see a copy of my visit note below.    [facility]  Sports Medicine Clinic  Clinics and Surgery Center           SUBJECTIVE       Romi Cortez is a 62 year old female presenting to clinic today wikth right knee pain..    Background:   Date of injury: No injury  Duration of symptoms: 2-3 months  Mechanism of Injury: No injury  Intensity: 8/10  Aggravating factors: Walking, Stairs, standing from a seated position  Relieving Factors: ice, PT  Prior Evaluation: [facility] in PT       PMH, Medications and Allergies were reviewed and updated as needed.    ROS:  As noted above otherwise negative.    Patient Active Problem List   Diagnosis     Appendicitis     Dermatitis, seborrheic     AK (actinic keratosis)     Senile sebaceous gland hyperplasia     Lentigines     Injury of forearm muscle or tendon, unspecified laterality, subsequent encounter     Traumatic complete tear of right rotator cuff, subsequent encounter       Current Outpatient Medications   Medication Sig Dispense Refill     diclofenac (VOLTAREN) 1 % topical gel Apply 4 g topically 4 times daily as needed for moderate pain 350 g 0     naproxen (NAPROSYN) 500 MG tablet Take 1 tablet (500 mg) by mouth 2 times daily as needed for moderate pain 28 tablet 0     acetaminophen (TYLENOL) 325 MG tablet Take 2 tablets (650 mg) by mouth every 4 hours as needed for mild pain 50 tablet 0     ARTIFICIAL TEAR OP Apply 1 drop to eye as needed Both eyes, 1-2 times a month       ibuprofen (ADVIL/MOTRIN) 600 MG tablet Take 1 tablet (600 mg) by mouth every 6 hours as needed for moderate pain 40 tablet 0     LamoTRIgine (LAMICTAL PO) Take 200 mg by mouth daily       methylPREDNISolone (MEDROL DOSEPAK) 4 MG tablet Follow package instructions 21  tablet 0     oxyCODONE (ROXICODONE) 5 MG tablet Take 1-2 tablets (5-10 mg) by mouth every 4 hours as needed for moderate to severe pain (Patient not taking: Reported on 3/31/2022) 20 tablet 0     senna-docusate (SENOKOT-S/PERICOLACE) 8.6-50 MG tablet Take 1-2 tablets by mouth 2 times daily (Patient not taking: Reported on 3/31/2022) 30 tablet 0     sertraline (ZOLOFT) 100 MG tablet Take by mouth daily       triamcinolone (KENALOG) 0.025 % ointment Apply topically 2 times daily       triamcinolone (KENALOG) 0.1 % external ointment Apply topically 2 times daily To your affected areas on your hands and feet 80 g 3     zolpidem (AMBIEN) 5 MG tablet Take 1 tablet (5 mg) by mouth nightly as needed for sleep 1 tablet 0            OBJECTIVE:       Vitals: There were no vitals filed for this visit.  BMI: There is no height or weight on file to calculate BMI.    Gen:  Well nourished and in no acute distress  HEENT: Extraocular movement intact  Neck: Supple  Pulm:  Breathing Comfortably. No increased respiratory effort.  Psych: Euthymic. Appropriately answers questions    MSK: Right knee without evidence of effusion.  KT tape in place.  No discernible tenderness to palpation about the medial or lateral joint line.  No tenderness to palpation posteriorly, or edema.  Range of motion diminished in flexion to 90 degrees, with pain ensuing deep into the knee space.  Negative Lachman and posterior drawer.  Negative varus/valgus stress testing.  Negative Apley Yolanda.  Positive patellar compression, without apprehension.      XRAY : Moderate to severe patellofemoral osteoarthritis, with noticeable medial and lateral osteophytes.  Medial joint space narrowing, indicative of mild to moderate medial compartment osteoarthritis.  No acute osseous abnormality seen.  Will await official radiologic read.          ASSESSMENT and PLAN:     Romi was seen today for pain.    Diagnoses and all orders for this visit:    Osteoarthritis of right  patellofemoral joint  -     naproxen (NAPROSYN) 500 MG tablet; Take 1 tablet (500 mg) by mouth 2 times daily as needed for moderate pain  -     diclofenac (VOLTAREN) 1 % topical gel; Apply 4 g topically 4 times daily as needed for moderate pain    Primary localized osteoarthritis of right knee  -     naproxen (NAPROSYN) 500 MG tablet; Take 1 tablet (500 mg) by mouth 2 times daily as needed for moderate pain  -     diclofenac (VOLTAREN) 1 % topical gel; Apply 4 g topically 4 times daily as needed for moderate pain    Other orders  -     XR Knee Right 3 Views; Future      62-year-old female presenting to clinic today with chronic history of right knee pain, no injuries.  X-rays were obtained, in conjunction with physical exam she does fit the clinical picture for patellofemoral and medial compartment osteoarthritis.  I believe her flexion pain is caused by compression of the patella further into the trochlear groove.    Plan: Have discussed these etiologies and prognosis with the patient in great detail.  X-rays were reviewed with her as well.  At this point, I would like her to continue physical therapy for overall functional improvement and strengthening.  For her pain, the patient has not tried any oral analgesics.  Tylenol is fine.  I have also sent them a topical anti-inflammatory, Voltaren, to which she should use up to 4 times a day as needed as first-line treatment.  If she is refractory in pain to this, I have also sent in an oral naproxen 500 mg to be used twice daily with food, no other NSAIDs while on either medication individually, and they should not be used together.  If the patient is continuing to have pain despite these modalities, she should return to clinic for a right knee corticosteroid injection.    Options for treatment and/or follow-up care were reviewed with the patient was actively involved in the decision making process. Patient verbalized understanding and was in agreement with the  plan.    Zane Kitchen DO  , Sports Medicine  Department of Family Medicine and [facility]

## 2022-11-04 NOTE — PROGRESS NOTES
[facility]  Sports Medicine Clinic  Clinics and Surgery Center           SUBJECTIVE       Romi Cortez is a 62 year old female presenting to clinic today wikt right knee pain..    Background:   Date of injury: No injury  Duration of symptoms: 2-3 months  Mechanism of Injury: No injury  Intensity: 8/10  Aggravating factors: Walking, Stairs, standing from a seated position  Relieving Factors: ice, PT  Prior Evaluation: [facility] in PT       PMH, Medications and Allergies were reviewed and updated as needed.    ROS:  As noted above otherwise negative.    Patient Active Problem List   Diagnosis     Appendicitis     Dermatitis, seborrheic     AK (actinic keratosis)     Senile sebaceous gland hyperplasia     Lentigines     Injury of forearm muscle or tendon, unspecified laterality, subsequent encounter     Traumatic complete tear of right rotator cuff, subsequent encounter       Current Outpatient Medications   Medication Sig Dispense Refill     diclofenac (VOLTAREN) 1 % topical gel Apply 4 g topically 4 times daily as needed for moderate pain 350 g 0     naproxen (NAPROSYN) 500 MG tablet Take 1 tablet (500 mg) by mouth 2 times daily as needed for moderate pain 28 tablet 0     acetaminophen (TYLENOL) 325 MG tablet Take 2 tablets (650 mg) by mouth every 4 hours as needed for mild pain 50 tablet 0     ARTIFICIAL TEAR OP Apply 1 drop to eye as needed Both eyes, 1-2 times a month       ibuprofen (ADVIL/MOTRIN) 600 MG tablet Take 1 tablet (600 mg) by mouth every 6 hours as needed for moderate pain 40 tablet 0     LamoTRIgine (LAMICTAL PO) Take 200 mg by mouth daily       methylPREDNISolone (MEDROL DOSEPAK) 4 MG tablet Follow package instructions 21 tablet 0     oxyCODONE (ROXICODONE) 5 MG tablet Take 1-2 tablets (5-10 mg) by mouth every 4 hours as needed for moderate to severe pain (Patient not taking: Reported on 3/31/2022) 20 tablet 0     senna-docusate (SENOKOT-S/PERICOLACE) 8.6-50 MG tablet Take 1-2 tablets  by mouth 2 times daily (Patient not taking: Reported on 3/31/2022) 30 tablet 0     sertraline (ZOLOFT) 100 MG tablet Take by mouth daily       triamcinolone (KENALOG) 0.025 % ointment Apply topically 2 times daily       triamcinolone (KENALOG) 0.1 % external ointment Apply topically 2 times daily To your affected areas on your hands and feet 80 g 3     zolpidem (AMBIEN) 5 MG tablet Take 1 tablet (5 mg) by mouth nightly as needed for sleep 1 tablet 0            OBJECTIVE:       Vitals: There were no vitals filed for this visit.  BMI: There is no height or weight on file to calculate BMI.    Gen:  Well nourished and in no acute distress  HEENT: Extraocular movement intact  Neck: Supple  Pulm:  Breathing Comfortably. No increased respiratory effort.  Psych: Euthymic. Appropriately answers questions    MSK: Right knee without evidence of effusion.  KT tape in place.  No discernible tenderness to palpation about the medial or lateral joint line.  No tenderness to palpation posteriorly, or edema.  Range of motion diminished in flexion to 90 degrees, with pain ensuing deep into the knee space.  Negative Lachman and posterior drawer.  Negative varus/valgus stress testing.  Negative Apley Yolanda.  Positive patellar compression, without apprehension.      XRAY : Moderate to severe patellofemoral osteoarthritis, with noticeable medial and lateral osteophytes.  Medial joint space narrowing, indicative of mild to moderate medial compartment osteoarthritis.  No acute osseous abnormality seen.  Will await official radiologic read.          ASSESSMENT and PLAN:     Romi was seen today for pain.    Diagnoses and all orders for this visit:    Osteoarthritis of right patellofemoral joint  -     naproxen (NAPROSYN) 500 MG tablet; Take 1 tablet (500 mg) by mouth 2 times daily as needed for moderate pain  -     diclofenac (VOLTAREN) 1 % topical gel; Apply 4 g topically 4 times daily as needed for moderate pain    Primary localized  osteoarthritis of right knee  -     naproxen (NAPROSYN) 500 MG tablet; Take 1 tablet (500 mg) by mouth 2 times daily as needed for moderate pain  -     diclofenac (VOLTAREN) 1 % topical gel; Apply 4 g topically 4 times daily as needed for moderate pain    Other orders  -     XR Knee Right 3 Views; Future      62-year-old female presenting to clinic today with chronic history of right knee pain, no injuries.  X-rays were obtained, in conjunction with physical exam she does fit the clinical picture for patellofemoral and medial compartment osteoarthritis.  I believe her flexion pain is caused by compression of the patella further into the trochlear groove.    Plan: Have discussed these etiologies and prognosis with the patient in great detail.  X-rays were reviewed with her as well.  At this point, I would like her to continue physical therapy for overall functional improvement and strengthening.  For her pain, the patient has not tried any oral analgesics.  Tylenol is fine.  I have also sent them a topical anti-inflammatory, Voltaren, to which she should use up to 4 times a day as needed as first-line treatment.  If she is refractory in pain to this, I have also sent in an oral naproxen 500 mg to be used twice daily with food, no other NSAIDs while on either medication individually, and they should not be used together.  If the patient is continuing to have pain despite these modalities, she should return to clinic for a right knee corticosteroid injection.    Options for treatment and/or follow-up care were reviewed with the patient was actively involved in the decision making process. Patient verbalized understanding and was in agreement with the plan.    Zane Kitchen DO  , Sports Medicine  Department of Family Medicine and [facility]

## 2022-11-04 NOTE — PROGRESS NOTES
[facility]    OUTPATIENT Physical Therapy ORTHOPEDIC EVALUATION  PLAN OF TREATMENT FOR OUTPATIENT REHABILITATION  (COMPLETE FOR INITIAL CLAIMS ONLY)  Patient's Last Name, First Name, M.I.  YOB: 1960  Romi Cortez    Provider s Name:  [facility]   Medical Record No.  [id number]   Start of Care Date:  11/04/22   Onset Date:   10/20/22   Treatment Diagnosis:  R knee Medical Diagnosis:     Traumatic complete tear of right rotator cuff, subsequent encounter  Knee pain, right       Goals:     11/04/22 0500   Body Part   Goals listed below are for knee   Goal #1   Goal #1 ambulation   Previous Functional Level No restrictions   Current Functional Level Minutes patient can walk   Performance Level 3 without AD, antalgic gait pattern   STG Target Performance Minutes patient will be able to walk   Performance Level 5 SEC   Rationale to maintain proper body mechanics/posture while ambulating to avoid additional compensatory injury due to improper gait mechanics   Due Date 11/25/22    LTG Target Performance Minutes patient will be able to  walk   Performance Level 15 without gait deviation, no AD   Rationale to maintain proper body mechanics/posture while ambulating to avoid additional compensatory injury due to improper gait mechanics   Due Date 01/03/23   Goal #2   Goal #2 lifting/carrying   Previous Functional Level No restrictions   Current Functional Level Cannot use arm repetitively or for prolonged time   STG Target Performance Repetitive or prolonged use of arm at shoulder height for;Lift an item off floor to seat height weighing   Performance level 5 min   Rationale for housework such as laundry, emptying garbage, use of    Due date 09/08/22   LTG Target Performance Repetitive or prolonged use of arm at should height for   Performance Level 10 min    Rationale for housework such as laundry, emptying garbage, use of    Due date 10/17/22       Therapy Frequency:  1x/week  Predicted Duration of Therapy Intervention:  6 weeks    Mayra Altamirano, PT                 I CERTIFY THE NEED FOR THESE SERVICES FURNISHED UNDER        THIS PLAN OF TREATMENT AND WHILE UNDER MY CARE     (Physician co-signature of this document indicates review and certification of the therapy plan).                     Certification Date From:  11/04/22   Certification Date To:  02/01/23    Referring Provider:  Fiona Yen    Initial Assessment        See Epic Evaluation SOC Date: 11/04/22

## 2022-11-07 ENCOUNTER — THERAPY VISIT (OUTPATIENT)
Dept: PHYSICAL THERAPY | Facility: CLINIC | Age: 62
End: 2022-11-07
Payer: COMMERCIAL

## 2022-11-07 DIAGNOSIS — M25.561 KNEE PAIN, RIGHT: Primary | ICD-10-CM

## 2022-11-07 PROCEDURE — 97110 THERAPEUTIC EXERCISES: CPT | Mod: GP | Performed by: PHYSICAL THERAPIST

## 2022-11-07 PROCEDURE — 97140 MANUAL THERAPY 1/> REGIONS: CPT | Mod: GP | Performed by: PHYSICAL THERAPIST

## 2022-11-07 PROCEDURE — 97530 THERAPEUTIC ACTIVITIES: CPT | Mod: GP | Performed by: PHYSICAL THERAPIST

## 2022-11-07 PROCEDURE — 97016 VASOPNEUMATIC DEVICE THERAPY: CPT | Mod: GP | Performed by: PHYSICAL THERAPIST

## 2022-11-15 ENCOUNTER — THERAPY VISIT (OUTPATIENT)
Dept: PHYSICAL THERAPY | Facility: CLINIC | Age: 62
End: 2022-11-15
Payer: COMMERCIAL

## 2022-11-15 DIAGNOSIS — M17.11 PRIMARY LOCALIZED OSTEOARTHRITIS OF RIGHT KNEE: ICD-10-CM

## 2022-11-15 DIAGNOSIS — M17.11 OSTEOARTHRITIS OF RIGHT PATELLOFEMORAL JOINT: ICD-10-CM

## 2022-11-15 DIAGNOSIS — M25.561 KNEE PAIN, RIGHT: Primary | ICD-10-CM

## 2022-11-15 PROCEDURE — 97530 THERAPEUTIC ACTIVITIES: CPT | Mod: GP | Performed by: PHYSICAL THERAPIST

## 2022-11-15 PROCEDURE — 97140 MANUAL THERAPY 1/> REGIONS: CPT | Mod: GP | Performed by: PHYSICAL THERAPIST

## 2022-11-15 PROCEDURE — 97016 VASOPNEUMATIC DEVICE THERAPY: CPT | Mod: GP | Performed by: PHYSICAL THERAPIST

## 2022-11-15 PROCEDURE — 97110 THERAPEUTIC EXERCISES: CPT | Mod: GP | Performed by: PHYSICAL THERAPIST

## 2022-11-16 ENCOUNTER — LAB REQUISITION (OUTPATIENT)
Dept: LAB | Facility: CLINIC | Age: 62
End: 2022-11-16
Payer: COMMERCIAL

## 2022-11-16 DIAGNOSIS — Z13.220 ENCOUNTER FOR SCREENING FOR LIPOID DISORDERS: ICD-10-CM

## 2022-11-16 DIAGNOSIS — Z12.4 ENCOUNTER FOR SCREENING FOR MALIGNANT NEOPLASM OF CERVIX: ICD-10-CM

## 2022-11-16 PROCEDURE — 87624 HPV HI-RISK TYP POOLED RSLT: CPT | Mod: ORL | Performed by: FAMILY MEDICINE

## 2022-11-16 PROCEDURE — G0145 SCR C/V CYTO,THINLAYER,RESCR: HCPCS | Mod: ORL | Performed by: FAMILY MEDICINE

## 2022-11-16 PROCEDURE — 80061 LIPID PANEL: CPT | Mod: ORL | Performed by: FAMILY MEDICINE

## 2022-11-17 LAB
CHOLEST SERPL-MCNC: 245 MG/DL
HDLC SERPL-MCNC: 52 MG/DL
LDLC SERPL CALC-MCNC: 132 MG/DL
NONHDLC SERPL-MCNC: 193 MG/DL
TRIGL SERPL-MCNC: 306 MG/DL

## 2022-11-18 LAB
BKR LAB AP GYN ADEQUACY: NORMAL
BKR LAB AP GYN INTERPRETATION: NORMAL
BKR LAB AP HPV REFLEX: NORMAL
BKR LAB AP LMP: NORMAL
BKR LAB AP PREVIOUS ABNORMAL: NORMAL
PATH REPORT.COMMENTS IMP SPEC: NORMAL
PATH REPORT.COMMENTS IMP SPEC: NORMAL
PATH REPORT.RELEVANT HX SPEC: NORMAL

## 2022-11-18 NOTE — TELEPHONE ENCOUNTER
naproxen (NAPROSYN) 500 MG tablet  Last Written Prescription Date:   11/4/2022-11/18/2022  Last Fill Quantity: 28,   # refills: 0  Last Office Visit :  6/22/2022  Future Office visit:  None    Routing refill request to provider for review/approval because:  Needing updated CBC, Creatinine, AST, ALT  Refer to clinic for review and refills per Providers orders.       Jeanie Gomez RN  Central Triage Red Flags/Med Refills

## 2022-11-21 RX ORDER — NAPROXEN 500 MG/1
500 TABLET ORAL 2 TIMES DAILY PRN
Qty: 60 TABLET | Refills: 0 | Status: SHIPPED | OUTPATIENT
Start: 2022-11-21 | End: 2023-02-16

## 2022-11-22 LAB
HUMAN PAPILLOMA VIRUS 16 DNA: NEGATIVE
HUMAN PAPILLOMA VIRUS 18 DNA: NEGATIVE
HUMAN PAPILLOMA VIRUS FINAL DIAGNOSIS: NORMAL
HUMAN PAPILLOMA VIRUS OTHER HR: NEGATIVE

## 2022-11-30 ENCOUNTER — TELEPHONE (OUTPATIENT)
Dept: GASTROENTEROLOGY | Facility: CLINIC | Age: 62
End: 2022-11-30

## 2022-11-30 ENCOUNTER — ANCILLARY PROCEDURE (OUTPATIENT)
Dept: MAMMOGRAPHY | Facility: CLINIC | Age: 62
End: 2022-11-30
Attending: FAMILY MEDICINE
Payer: COMMERCIAL

## 2022-11-30 DIAGNOSIS — Z12.31 SCREENING MAMMOGRAM FOR BREAST CANCER: ICD-10-CM

## 2022-11-30 DIAGNOSIS — Z12.11 ENCOUNTER FOR SCREENING COLONOSCOPY: Primary | ICD-10-CM

## 2022-11-30 PROCEDURE — 77067 SCR MAMMO BI INCL CAD: CPT | Mod: GC | Performed by: RADIOLOGY

## 2022-11-30 RX ORDER — BISACODYL 5 MG/1
TABLET, DELAYED RELEASE ORAL
Qty: 4 TABLET | Refills: 0 | Status: ON HOLD | OUTPATIENT
Start: 2022-11-30 | End: 2023-08-09

## 2022-11-30 NOTE — TELEPHONE ENCOUNTER
Called patient in attempts to complete pre assessment for upcoming colonoscopy  procedure on 12/8/22.    Upon review patient reported +Covid on 11/25/22 home test. Procedure will need to be delayed 14 days. Patient verbalized understanding and call transferred to endoscopy scheduling to reschedule.     --------------------------------------------------------    Indication for procedure: screening     Bowel prep recommendation: Standard Golytely d/t mg citrate recall    Bowel prep script sent to    [id number] - [place], [place] - [address]    Alanna Sanchez RN

## 2022-11-30 NOTE — TELEPHONE ENCOUNTER
Caller: Romi Cortez     Procedure: COLONOSCOPY     Date, Location, and Surgeon of Procedure Cancelled: 12/08/2022 - [facility]     Ordering Provider:SIENNA     Reason for cancel (please be detailed, any staff messages or encounters to note?):     COVID (+) 11/25/2022        Rescheduled: YES      If rescheduled:    Date: 01/26/2023   Location: [facility]    Prep Resent: YES (changes to prep?)   Covid Test Rescheduled: NOT REQUIRED    Note any change or update to original order/sedation: N/A

## 2022-12-07 ENCOUNTER — THERAPY VISIT (OUTPATIENT)
Dept: PHYSICAL THERAPY | Facility: CLINIC | Age: 62
End: 2022-12-07
Payer: COMMERCIAL

## 2022-12-07 DIAGNOSIS — M25.561 KNEE PAIN, RIGHT: Primary | ICD-10-CM

## 2022-12-07 PROCEDURE — 97140 MANUAL THERAPY 1/> REGIONS: CPT | Mod: GP | Performed by: PHYSICAL THERAPIST

## 2022-12-07 PROCEDURE — 97530 THERAPEUTIC ACTIVITIES: CPT | Mod: GP | Performed by: PHYSICAL THERAPIST

## 2022-12-07 PROCEDURE — 97110 THERAPEUTIC EXERCISES: CPT | Mod: GP | Performed by: PHYSICAL THERAPIST

## 2022-12-26 ENCOUNTER — THERAPY VISIT (OUTPATIENT)
Dept: PHYSICAL THERAPY | Facility: CLINIC | Age: 62
End: 2022-12-26
Payer: COMMERCIAL

## 2022-12-26 DIAGNOSIS — M25.561 KNEE PAIN, RIGHT: Primary | ICD-10-CM

## 2022-12-26 PROCEDURE — 97110 THERAPEUTIC EXERCISES: CPT | Mod: GP | Performed by: PHYSICAL THERAPIST

## 2022-12-26 PROCEDURE — 97112 NEUROMUSCULAR REEDUCATION: CPT | Mod: GP | Performed by: PHYSICAL THERAPIST

## 2022-12-26 PROCEDURE — 97530 THERAPEUTIC ACTIVITIES: CPT | Mod: GP | Performed by: PHYSICAL THERAPIST

## 2023-01-11 NOTE — TELEPHONE ENCOUNTER
Attempted to contact patient regarding upcoming colonoscopy  procedure on 1/26/23 for pre assessment questions.    Discuss Covid policy. Pt tested positive for covid on 1/9/23. She isn't feeling well and would like a call back at a later date.     Pt procedure will be over 14 days out from positive test.     Pre op exam scheduled: N/A    Arrival time: 1345    Facility location: [facility]; [address] [place], [place] [id number]    Sedation type: Conscious sedation     Anticoagulants: No    Electronic implanted devices? No    Diabetic? No    Indication for procedure: screening    Bowel prep recommendation: Verify if pt takes oxycodone regularly and go over bowel habits.    Prep instructions sent via pending oxycodone use. Bowel prep script sent to      [id number] - [place], [place] - [address] & ALBER Diaz RN  Endoscopy Procedure Pre Assessment RN

## 2023-01-16 ENCOUNTER — TELEPHONE (OUTPATIENT)
Dept: GASTROENTEROLOGY | Facility: CLINIC | Age: 63
End: 2023-01-16

## 2023-01-16 ENCOUNTER — HOSPITAL ENCOUNTER (OUTPATIENT)
Facility: CLINIC | Age: 63
End: 2023-01-16
Attending: INTERNAL MEDICINE | Admitting: INTERNAL MEDICINE
Payer: MEDICARE

## 2023-01-16 NOTE — TELEPHONE ENCOUNTER
Called pt back - pt is requesting to reschedule at this time.    Pt was transferred.     Alanna Diaz RN on 1/16/2023 at 1:14 PM

## 2023-01-16 NOTE — TELEPHONE ENCOUNTER
Called pt back - she is requesting a call later in the day.    Alanna Diaz RN on 1/16/2023 at 11:00 AM

## 2023-01-16 NOTE — TELEPHONE ENCOUNTER
Caller: Romi Cortez    Reason for Reschedule/Cancellation (please be detailed, any staff messages or encounters to note?): Has Covid      Prior to reschedule please review:    Ordering Provider:Mayra Persaud MD     Sedation per order:Moderate    Does patient have any ASC Exclusions, please identify?: Y, ALE      Notes on Cancelled Procedure:    Procedure:Lower Endoscopy [Colonoscopy]     Date: 01/26/23    Location:[facility]; [address] [place], [place] [address]    Surgeon: Oliver        Rescheduled: Yes    Procedure: Lower Endoscopy [Colonoscopy]     Date: 04/03/23    Location:[facility]; [address] [place] [id number]    Surgeon: Chance    Sedation Level Scheduled  Moderate,  Reason for Sedation Level Per Order    Prep/Instructions updated and sent: Yes

## 2023-01-25 ENCOUNTER — THERAPY VISIT (OUTPATIENT)
Dept: PHYSICAL THERAPY | Facility: CLINIC | Age: 63
End: 2023-01-25
Payer: COMMERCIAL

## 2023-01-25 DIAGNOSIS — M25.561 KNEE PAIN, RIGHT: Primary | ICD-10-CM

## 2023-01-25 PROCEDURE — 97530 THERAPEUTIC ACTIVITIES: CPT | Mod: GP | Performed by: PHYSICAL THERAPIST

## 2023-01-25 PROCEDURE — 97110 THERAPEUTIC EXERCISES: CPT | Mod: GP | Performed by: PHYSICAL THERAPIST

## 2023-02-16 ENCOUNTER — OFFICE VISIT (OUTPATIENT)
Dept: DERMATOLOGY | Facility: CLINIC | Age: 63
End: 2023-02-16
Payer: COMMERCIAL

## 2023-02-16 DIAGNOSIS — L30.9 DERMATITIS: Primary | ICD-10-CM

## 2023-02-16 DIAGNOSIS — D22.9 MULTIPLE BENIGN NEVI: ICD-10-CM

## 2023-02-16 DIAGNOSIS — D18.01 CHERRY ANGIOMA: ICD-10-CM

## 2023-02-16 DIAGNOSIS — L82.1 SEBORRHEIC KERATOSES: ICD-10-CM

## 2023-02-16 DIAGNOSIS — L81.4 SOLAR LENTIGINOSIS: ICD-10-CM

## 2023-02-16 PROCEDURE — 99214 OFFICE O/P EST MOD 30 MIN: CPT | Mod: GC | Performed by: DERMATOLOGY

## 2023-02-16 RX ORDER — CLOBETASOL PROPIONATE 0.5 MG/G
OINTMENT TOPICAL 2 TIMES DAILY
Qty: 60 G | Refills: 3 | Status: SHIPPED | OUTPATIENT
Start: 2023-02-16

## 2023-02-16 NOTE — PROGRESS NOTES
[facility] Dermatology Note  Encounter Date: Feb 16, 2023  Office Visit     Dermatology Problem List:  FBSE 2/16/23   # AKs, LN   # Dermatitis: eczematous vs psoriasiform, hands  - Clobetasol   # Seb derm, keto shampoo  ____________________________________________    Assessment & Plan:     # Dermatitis, hands: AD vs ICD/ACD vs psoriasiform given lesions behind the ears. Plays violin as hobby could be ACD/ICD to something such as resin. Will start clobetasol ointment BID with Vaseline at night under cotton glove occlusion. Discussed gentle skin cares with patient. Will consider allergy testing in the future.     # Benign nevi  # Cherry angiomas  # Seborrheic keratoses   # Solar lentigines   - Reassurance  - ABCDEs: Counseled ABCDEs of melanoma: Asymmetry, Border (irregularity), Color (not uniform, changes in color), Diameter (greater than 6 mm which is about the size of a pencil eraser), and Evolving (any changes in preexisting moles).  - Sun protection: Counseled SPF30+ broad spectrum sunscreen, UPF clothing, sun avoidance, tanning bed avoidance.  - Recommend annual skin cancer screening exams       Procedures Performed:   None    Follow-up: 6 month(s) in-person, or earlier for new or changing lesions    Staff and Resident:     Adriana Garnett MD     The patient was seen and staffed with Dr. Beni MD     I have personally examined this patient and agree with the resident doctor's documentation and plan of care. I have reviewed and amended the resident's note above. The documentation accurately reflects my clinical observations, diagnoses, treatment and follow-up plans.     Nayana Bazan MD  Dermatology Staff      ____________________________________________    CC: Skin Check (FBSE. )    HPI:  Ms. Romi Cortez is a 63 year old female who presents as a follow-up for FBSE. Seen last in 2021 at that time had benign biopsy of lesion on the left leg. Today complains of painful lesions  usually on the nuckles that come and go move around, there is fissuring and pinpoint bleeding in some areas. Has tried triamcinolone in the past. Uses cetaphil lotion. Plays violin but does not think she comes into contact with resin. Denies history of eczema, allergies, asthma.     Denies any other new, changing, bleeding, or nonhealing lesions.     Labs Reviewed:  N/A    Physical Exam:  Vitals: There were no vitals taken for this visit.  SKIN: Total skin excluding the undergarment areas was performed. The exam included the head/face, neck, both arms, chest, back, abdomen, both legs, digits and/or nails.   - Multiple brown macules on the trunk and extremities, no concerning features   - Waxy brown stuck on papules on trunk and extremities   - Red dome shaped papules on trunk and extremities   - Light brown macules accenuated on sun exposed areas  - On the hands there are well-demarcated eczematous/psorisiform plaques on several nuckles, today Left > right. There is fissuring at some of the fingertips and pinpoint bleeding  - there is erythema and scale behind the ears   - No other lesions of concern on areas examined.     Medications:  Current Outpatient Medications   Medication     bisacodyl (DULCOLAX) 5 MG EC tablet     clobetasol (TEMOVATE) 0.05 % external ointment     diclofenac (VOLTAREN) 1 % topical gel     LamoTRIgine (LAMICTAL PO)     polyethylene glycol (GOLYTELY) 236 g suspension     sertraline (ZOLOFT) 100 MG tablet     triamcinolone (KENALOG) 0.025 % ointment     triamcinolone (KENALOG) 0.1 % external ointment     No current facility-administered medications for this visit.      Past Medical History:   Patient Active Problem List   Diagnosis     Appendicitis     Dermatitis, seborrheic     AK (actinic keratosis)     Senile sebaceous gland hyperplasia     Lentigines     Injury of forearm muscle or tendon, unspecified laterality, subsequent encounter     Traumatic complete tear of right rotator cuff,  subsequent encounter     History reviewed. No pertinent past medical history.    CC Referred Self, MD  No address on file on close of this encounter.

## 2023-02-16 NOTE — LETTER
2/16/2023       RE: Romi Cortez  [address] [facility] [address]     Dear Colleague,    Thank you for referring your patient, Romi Cortez, to the [facility] DERMATOLOGY [facility] [place] [facility]. Please see a copy of my visit note below.    [facility] Dermatology Note  Encounter Date: Feb 16, 2023  Office Visit     Dermatology Problem List:  FBSE 2/16/23   # AKs, LN   # Dermatitis: eczematous vs psoriasiform, hands  - Clobetasol   # Seb derm, keto shampoo  ____________________________________________    Assessment & Plan:     # Dermatitis, hands: AD vs ICD/ACD vs psoriasiform given lesions behind the ears. Plays violin as hobby could be ACD/ICD to something such as resin. Will start clobetasol ointment BID with Vaseline at night under cotton glove occlusion. Discussed gentle skin cares with patient. Will consider allergy testing in the future.     # Benign nevi  # Cherry angiomas  # Seborrheic keratoses   # Solar lentigines   - Reassurance  - ABCDEs: Counseled ABCDEs of melanoma: Asymmetry, Border (irregularity), Color (not uniform, changes in color), Diameter (greater than 6 mm which is about the size of a pencil eraser), and Evolving (any changes in preexisting moles).  - Sun protection: Counseled SPF30+ broad spectrum sunscreen, UPF clothing, sun avoidance, tanning bed avoidance.  - Recommend annual skin cancer screening exams       Procedures Performed:   None    Follow-up: 6 month(s) in-person, or earlier for new or changing lesions    Staff and Resident:     Adriana Garnett MD     The patient was seen and staffed with Dr. Beni MD     I have personally examined this patient and agree with the resident doctor's documentation and plan of care. I have reviewed and amended the resident's note above. The documentation accurately reflects my clinical observations, diagnoses, treatment and follow-up plans.      Nayana Bazan MD  Dermatology Staff      ____________________________________________    CC: Skin Check (FBSE. )    HPI:  Ms. Romi Cortez is a 63 year old female who presents as a follow-up for FBSE. Seen last in 2021 at that time had benign biopsy of lesion on the left leg. Today complains of painful lesions usually on the nuckles that come and go move around, there is fissuring and pinpoint bleeding in some areas. Has tried triamcinolone in the past. Uses cetaphil lotion. Plays violin but does not think she comes into contact with resin. Denies history of eczema, allergies, asthma.     Denies any other new, changing, bleeding, or nonhealing lesions.     Labs Reviewed:  N/A    Physical Exam:  Vitals: There were no vitals taken for this visit.  SKIN: Total skin excluding the undergarment areas was performed. The exam included the head/face, neck, both arms, chest, back, abdomen, both legs, digits and/or nails.   - Multiple brown macules on the trunk and extremities, no concerning features   - Waxy brown stuck on papules on trunk and extremities   - Red dome shaped papules on trunk and extremities   - Light brown macules accenuated on sun exposed areas  - On the hands there are well-demarcated eczematous/psorisiform plaques on several nuckles, today Left > right. There is fissuring at some of the fingertips and pinpoint bleeding  - there is erythema and scale behind the ears   - No other lesions of concern on areas examined.     Medications:  Current Outpatient Medications   Medication     bisacodyl (DULCOLAX) 5 MG EC tablet     clobetasol (TEMOVATE) 0.05 % external ointment     diclofenac (VOLTAREN) 1 % topical gel     LamoTRIgine (LAMICTAL PO)     polyethylene glycol (GOLYTELY) 236 g suspension     sertraline (ZOLOFT) 100 MG tablet     triamcinolone (KENALOG) 0.025 % ointment     triamcinolone (KENALOG) 0.1 % external ointment     No current facility-administered medications for this visit.      Past  Medical History:   Patient Active Problem List   Diagnosis     Appendicitis     Dermatitis, seborrheic     AK (actinic keratosis)     Senile sebaceous gland hyperplasia     Lentigines     Injury of forearm muscle or tendon, unspecified laterality, subsequent encounter     Traumatic complete tear of right rotator cuff, subsequent encounter     History reviewed. No pertinent past medical history.    CC Referred Self, MD  No address on file on close of this encounter.

## 2023-02-16 NOTE — NURSING NOTE
Dermatology Rooming Note    Romi Cortez's goals for this visit include:   Chief Complaint   Patient presents with     Skin Check     FBSE.      John Churchill, EMT-B

## 2023-02-16 NOTE — PATIENT INSTRUCTIONS
"Sun Protection    Recommend sunscreen (more brands also below):  - ISCARMEN Eryfotona Actinica Mineral Sunscreen SPF 50+ Zinc Oxide  - ISCARMEN Minear Brush SPF 50 (for re-application throughout the day)       Sunscreen   What does \"broad spectrum mean\"?  Broad spectrum sunscreens protect against both UVA and UVB radiation. UVC is filtered out by the ozone layer.     What does SPF mean?   SPF stands for  Sun Protection Factor  and represents the ability to screen only UVB (burning) rays. UVB rays are mostly blocked in all sunscreens, but only those that contain titanium dioxide, zinc oxide, mexoryl or Parsol 1789 (avobenzone) block the UVA spectrum. Even though a sunscreen is labeled  UVA/UVB Protection  that is not entirely accurate because products that only partially protect against UVA can claim to protect against both UVA and UVB.     What SPF should I chose?   Aim to get a sunscreen that is at least sun protection factor (SPF) 30. SPF 15 provides about 92-93% coverage, SPF 30 about 95-97% coverage, and SPF 45 about 98% coverage. That is to say, SPF 30 is not twice as good as SPF 15. The reason why we recommend SPF 30 is because we are usually only putting on half the necessary amount of sunscreen to achieve the advertised protection. That means that it is very possible that your SPF 30 sunscreen is only providing you with SPF 15 coverage based on how much you are applying. SPF 15 (92-93% coverage) is the absolute minimal that we recommend. Similarly, the benefit of sunscreens with SPF higher than 50 is that even if you put on less than the required amount, you are likely still getting good protection (ex: even if you apply only half the recommended amount of , it should still provide you with an SPF of 50).     How much should I apply?  If covering your whole body, you should be using 30 grams, or one ounce, which is how much is in one shot glass! That s a THICK layer! May times, you are only applying half " the recommended amount, which means that you are only getting half the SPF (for example, you may be using SPF 30 but if you're only applying half the recommended amount, you're only getting SPF 15 protection.      When do I need to wear sunscreen?  Every day, rain or shine! Even on a rainy day or a day when you are only indoors, you are still being exposed harmful UV radiation from the sun. We usually recommend physical/mineral sunscreens (active ingredient is titanium dioxide or zinc oxide) as these ingredients have been around for many years, there is no concern of them being absorbed into the bloodstream, and they are coral reef friendly! However, the best sunscreen is the one that you will use everyday.     What about my kids?  Sunscreen is not recommended for infants under the age of 6 months. Use clothing, shade and sun avoidance for small infants. For kids older than 6 months, we recommend that you should use only mineral/physical sunscreens that have zinc oxide as the active ingredient. Sun-protective clothing and hats are also important for people of all ages.     Sun protective clothing and Resources   Coolibar (www.coolibar.Smartjog)  Electro Power Systems (edupristine)  Athleta (wwwClubKviar)  Buzz360 (wwwPheedo)  Carve Designs (TerraWi) - affordable  Skinz (Paytellerskinz.com)    Long sleeve - Maria Teresa Cool DRI UPF 50 or Chattaroy PFG UPF 50  Hoodie - Chattaroy PFG UPF 50  Swimshirt/Rash Guard - Giuliana UPF 50 (on Amazon)  Neck - Outdoor Research Ubertubes (www.outdoorresearch.com)      Do I need tinted sunscreen?  There is more and more research showing that visible light can also lead to discoloration (such as melasma). Tinted sunscreens (which contain iron oxides) protect against visible light as an added bonus.     What brands do you recommend?  Physical/Mineral Sunscreens (in no particular order)  Elta MD UV Physical Broad-Spectrum SPF 41 Sunscreen (Tinted, $33)  Skin Ceuticals Physical Fusion UV  "Defense SPF 50 (Tinted, $34)  Unsun Mineral Tinted Face Sunscreen (Tinted, with 2 shade ranges, $29)  It Cosmetics CC+ Cream with SPF 50+ (Tinted, can also double as foundation/coverage -- great range of shades, $40)  Biossance Squalane + Zinc Sheer Mineral Sunscreen SPF 30 PA +++ (goes on white then blends in, $30)  Cerave 100% Mineral Sunscreen SPF 50 Face (good for sensitive skin, $15)  La Roche Posay Anthelios Mineral Zinc Oxide Sunscreen SPF 50 ($35)  La Roche Posay Anthelios Mineral Tinted Sunscreen for Face SPF 50 ($35)  Think Sport Sunscreen (great for sports, though has more of a white cast, $20)  Think Baby Sunscreen (for kids, $21)  Color Science Sunforgettable Total Protection Brush On Shield SPF 50 (Multiple tints, $130)    Chemical Sunscreens  Concetta Rouleau Weightless Protection SPF 30 ($48)  Song SPF Brightening Moisturizer ($30)  Urban Skin Complexion Protection Moisturizer SPF 30 ($20)  Total Defense + Repair Broad Spectrum SPF 34 ($68)  Clarins UV PLUS Anti-Pollution Sunscreen Multi-Protection Tint SPF 50 (Multiple tints, $45)  Neutrogena Healthy Skin Glow Sheers Tinted Moisturizer with SPF 20 (Multiple tints, $11)    The ABCDEs of Melanoma  Skin cancer can develop anywhere on the skin. Once a month, take a look at your entire body and note any changing moles or spots. Ask someone for help when checking your skin, especially for hard to see places such as your back. If you notice a mole that looks different from others, or one that changes, enlarges, itches, or bleeds, you should see a dermatologist.    Asymmetry, Border (irregularity), Color (not uniform, changes in color), Diameter (greater than 6 mm which is about the size of a pencil eraser), and Evolving (any changes in pre-existing moles). In short, look for the \"ugly duckling.\" You want all of the spots on your body to look like cousins (like they could be related). If something stands out, take a photo of it and make an appointment to " have it evaluated.     Suggested supplement:   - Heliocare - claims to maintain the skin's ability to protect itself against sun-related effects and aging.   - Not a replacement for sunscreen! Should be used in addition to sunscreen and sun protective measures such as hats, etc.  - Usually available online and at major retailers such as iChange, etc.

## 2023-03-01 ENCOUNTER — THERAPY VISIT (OUTPATIENT)
Dept: PHYSICAL THERAPY | Facility: CLINIC | Age: 63
End: 2023-03-01
Payer: MEDICARE

## 2023-03-01 DIAGNOSIS — M25.561 KNEE PAIN, RIGHT: Primary | ICD-10-CM

## 2023-03-01 PROCEDURE — 97530 THERAPEUTIC ACTIVITIES: CPT | Mod: GP | Performed by: PHYSICAL THERAPIST

## 2023-03-01 PROCEDURE — 97110 THERAPEUTIC EXERCISES: CPT | Mod: GP | Performed by: PHYSICAL THERAPIST

## 2023-03-01 NOTE — PROGRESS NOTES
PROGRESS  REPORT    Progress reporting period is from 12/7/22 to 3/1/23.       SUBJECTIVE   Subjective: Knee is bothersome occasionally, but otherwise doing well.  [facility]    Changes in function:  Yes (See Goal flowsheet attached for changes in current functional level)  Adverse reaction to treatment or activity: None    OBJECTIVE  Changes noted in objective findings:  Yes  Objective: No joint effusion R knee.  Normal gait.  Good quad contraction.  MMT glute med 5/5 on R     ASSESSMENT/PLAN  Updated problem list and treatment plan: Diagnosis 1:  Knee pain  STG/LTGs have been met or progress has been made towards goals:  Yes (See Goal flow sheet completed today.)  Assessment of Progress: The patient's condition is improving.  Self Management Plans:  Patient has been instructed in a home treatment program.  I have re-evaluated this patient and find that the nature, scope, duration and intensity of the therapy is appropriate for the medical condition of the patient.  Romi continues to require the following intervention to meet STG and LTG's:  PT intervention is no longer required to meet STG/LTG.    Recommendations:  This patient is ready to be discharged from therapy and continue their home treatment program.    Please refer to the daily flowsheet for treatment today, total treatment time and time spent performing 1:1 timed codes.

## 2023-03-10 ENCOUNTER — MEDICAL CORRESPONDENCE (OUTPATIENT)
Dept: HEALTH INFORMATION MANAGEMENT | Facility: CLINIC | Age: 63
End: 2023-03-10
Payer: MEDICARE

## 2023-03-15 ENCOUNTER — TELEPHONE (OUTPATIENT)
Dept: GASTROENTEROLOGY | Facility: CLINIC | Age: 63
End: 2023-03-15

## 2023-03-15 DIAGNOSIS — Z12.11 ENCOUNTER FOR SCREENING COLONOSCOPY: Primary | ICD-10-CM

## 2023-03-15 RX ORDER — BISACODYL 5 MG/1
TABLET, DELAYED RELEASE ORAL
Qty: 4 TABLET | Refills: 0 | Status: SHIPPED | OUTPATIENT
Start: 2023-03-15 | End: 2023-10-27

## 2023-03-15 NOTE — TELEPHONE ENCOUNTER
Patient scheduled for Colonoscopy  on 4/3/2023.     Discuss Covid policy.     Pre op exam needed? N/A    Arrival time: 0745. Procedure time 0845    Facility location: [facility]; [address], [place] [address]    Sedation type: Conscious sedation     Anticoagulations? No    Electronic implanted devices? No    Diabetic? No    Indication for procedure: screening    Bowel prep recommendation: Standard Golytely     Prep instructions sent via CitalDoc Bowel prep script sent to    [id number] - [place], [place] - [facility] AT [address]    Pre visit planning completed.    Whitley Alvarado RN  Endoscopy Procedure Pre Assessment RN

## 2023-03-15 NOTE — TELEPHONE ENCOUNTER
Attempted to contact patient regarding upcoming Colonoscopy  procedure on 4/3/2023 for pre assessment questions. No answer.     Left message to return call to [phone] #4    Whitley Alvarado RN  Endoscopy Procedure Pre Assessment RN

## 2023-03-17 ENCOUNTER — TELEPHONE (OUTPATIENT)
Dept: PSYCHIATRY | Facility: CLINIC | Age: 63
End: 2023-03-17
Payer: MEDICARE

## 2023-03-17 NOTE — TELEPHONE ENCOUNTER
PSYCHIATRY CLINIC PHONE INTAKE     SERVICES REQUESTED / INTERESTED IN          Med Management    Presenting Problem and Brief History                              What would you like to be seen for? (brief description):  Pt received her first diagnosis of depression in her early 20s. She's currently taking sertraline 150mg and lamictal 200mg. Pt is hoping to find ADHD medications that work well with her bipolar 2 diagnosis. Lamictal worked well to manage bi-polar, but now she notices symptoms of ADHD - hard to focus and complete tasks. No concerns with appetite or sleep, however she can stay awake.     Have you received a mental health diagnosis? Yes   Which one (s): Depression (from her 20s), anxiety, ADHD and Bi-polar 2 (mid 50s)  Is there any history of developmental delay?  No   Are you currently seeing a mental health provider?  Yes            Who / month last seen:  Britney Diaz @ [facility]  Do you have mental health records elsewhere?  No  Will you sign a release so we can obtain them?  Yes    Have you ever been hospitalized for psychiatric reasons?  No  Describe:  NA    Do you have current thoughts of self-harm?  No    Do you currently have thoughts of harming others?  No    Do you have any safety concerns? No   If yes to these, offer to reach out to a  for follow up.      Substance Use History     Do you have any history of alcohol / illicit drug use?  No  Describe:  NA  Have you ever received treatment for this?  No    Describe:  NA     Social History     Who is the patient's a guardian?  No    Name / number: NA  Have you had an ACT team in last 12 months?  No  Describe: NA   OK to leave a detailed voicemail?  Yes    Would you be interested in learning more about research opportunities for which you or your child may qualify? We can connect you with a team member for more information.  Yes  If yes, send an [facility] message to Joelle Murphy    Medical/ Surgical History                                    Patient Active Problem List   Diagnosis     Appendicitis     Dermatitis, seborrheic     AK (actinic keratosis)     Senile sebaceous gland hyperplasia     Lentigines     Injury of forearm muscle or tendon, unspecified laterality, subsequent encounter     Traumatic complete tear of right rotator cuff, subsequent encounter          Medications             Have you taken >3 psychiatric medications in your past?  No  Do you currently take 5 or more medications, including prescriptions, supplements, and other over the counter products?  unknown    If YES to at least one of these questions:   As part of your evaluation in our clinic, we have specially trained pharmacists as part of your care team. Your provider would like for you to meet with one of our pharmacists to review your current and past medications, ensure your med list is up to date, and queue up any questions or concerns you have about medications. They will review all of your medications, not just for mental health, to help ensure you know what you re taking and that everything is working together.     Please schedule patient in [facility] (Germaine Alfaro or Niki Gustafson) for 60m MTM in any green space as virtual (video), telephone, or in person (designated in person days per Epic templates).  -Appt notes can say  Psych eval on xx/xx   -Route telephone encounter to the pharmacist who will be seeing the patient.  If patient has questions about insurance coverage or billing, please still schedule the visit and refer them to call the [facility] coordinators at [phone].    Current Outpatient Medications   Medication Sig Dispense Refill     bisacodyl (DULCOLAX) 5 MG EC tablet Take 2 tablets at 3 pm the day before your procedure. If your procedure is before 11 am, take 2 additional tablets at 11 pm. If your procedure is after 11 am, take 2 additional tablets at 6 am. For additional instructions refer to your colonoscopy prep instructions. 4 tablet 0      bisacodyl (DULCOLAX) 5 MG EC tablet Take 2 tablets at 3 pm the day before your procedure. If your procedure is before 11 am, take 2 additional tablets at 11 pm. If your procedure is after 11 am, take 2 additional tablets at 6 am. For additional instructions refer to your colonoscopy prep instructions. 4 tablet 0     clobetasol (TEMOVATE) 0.05 % external ointment Apply topically 2 times daily Apply twice daily to areas of rash and fissuring on the hands and fingers. Use Vaseline over the entire hand at night and then cover in white gloves. 60 g 3     diclofenac (VOLTAREN) 1 % topical gel Apply 4 g topically 4 times daily as needed for moderate pain 350 g 0     LamoTRIgine (LAMICTAL PO) Take 200 mg by mouth daily       polyethylene glycol (GOLYTELY) 236 g suspension The night before the exam at 6 pm drink an 8-ounce glass every 15 minutes until the jug is half empty. If you arrive before 11 AM: Drink the other half of the Golytely jug at 11 PM night before procedure. If you arrive after 11 AM: Drink the other half of the Golytely jug at 6 AM day of procedure. For additional instructions refer to your colonoscopy prep instructions. 4000 mL 0     polyethylene glycol (GOLYTELY) 236 g suspension The night before the exam at 6 pm drink an 8-ounce glass every 15 minutes until the jug is half empty. If you arrive before 11 AM: Drink the other half of the Golytely jug at 11 PM night before procedure. If you arrive after 11 AM: Drink the other half of the Golytely jug at 6 AM day of procedure. For additional instructions refer to your colonoscopy prep instructions. 4000 mL 0     sertraline (ZOLOFT) 100 MG tablet Take by mouth daily       triamcinolone (KENALOG) 0.025 % ointment Apply topically 2 times daily       triamcinolone (KENALOG) 0.1 % external ointment Apply topically 2 times daily To your affected areas on your hands and feet 80 g 3         DISPOSITION      3/17/23 Intake complete. Scheduled for 5/15/23 at 10am w/ Kimberly  [address] - [place]

## 2023-03-21 ENCOUNTER — TELEPHONE (OUTPATIENT)
Dept: PSYCHIATRY | Facility: CLINIC | Age: 63
End: 2023-03-21
Payer: MEDICARE

## 2023-03-21 NOTE — TELEPHONE ENCOUNTER
[facility] Call Center    Phone Message    May a detailed message be left on voicemail: no     Reason for Call: Other: Recieved 15 pages of medical records from [facility] clinic. Faxed records to scanning for upcoming appt on  5/15/23.    Action Taken: Other: HIM Scanning    Travel Screening: Not Applicable

## 2023-03-27 NOTE — TELEPHONE ENCOUNTER
Second attempt for pre-assessment prior to upcoming colonoscopy     No answer.  Left message to return call [phone] #4    Work[employer]ugh[employer] message sent.    Jdayn Verduzco RN  Endoscopy Procedure Pre Assessment RN

## 2023-03-30 NOTE — TELEPHONE ENCOUNTER
Third attempt for pre-assessment prior to upcoming colonoscopy on 4.3.23     Patient answered. States she needs to reschedule and will call back to do that. Number provided, [phone] option #2    Yudith Carrion RN  Endoscopy Procedure Pre Assessment RN

## 2023-05-08 ENCOUNTER — VIRTUAL VISIT (OUTPATIENT)
Dept: PSYCHIATRY | Facility: CLINIC | Age: 63
End: 2023-05-08
Attending: SOCIAL WORKER
Payer: COMMERCIAL

## 2023-05-08 DIAGNOSIS — F31.81 BIPOLAR II DISORDER (H): Primary | ICD-10-CM

## 2023-05-08 DIAGNOSIS — F90.0 ADHD (ATTENTION DEFICIT HYPERACTIVITY DISORDER), INATTENTIVE TYPE: ICD-10-CM

## 2023-05-08 PROCEDURE — 99207 PR SERVICE NOT STAFFED W/SUPERV PROV: CPT | Mod: VID

## 2023-05-08 NOTE — PROGRESS NOTES
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 distractibility. Some of these behaviors were observed during today's session. She reproted that these symptoms existed when she was in school, but she was never diagnosed because she was able to hide it well enough. However, now she finds that these issues get in the way of her to succeed in her career. I will maintain her diagnosis of Attention Deficit Hyperactivity Disorder, inattentive type. Importantly, there is some overlap in symptoms of ADHD and anxiety and depression, such as poor concentration, poor sleep, and fidgeting, and greater control of these symptoms may improve depressive symptoms as well.     In addition, she endorsed significant anxiety, both generally about several stressors in her life (her family's health, her work, her relationships) and more specifically related to social anxiety (performances centered around her). 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 When she is anxious, she described having trouble controlling her worry, poor sleep caused by worrying, feeling restless, muscle tension, and difficulty  "concentrating. Given that anxiety can exist within the context of depression, particularly as it relates to feelings of worthlessness, more information should be gathered to assess if this anxiety warrants an additional diagnosis outside the context of bipolar disorder.    Finally, while it does not meet diagnostic criteria for an eating disorder, the patient endorsed that she had an \"unhealthy\" relationship with food and had negative self-talk regarding her eating. This may be a useful topic to explore further in therapy if the patient desires to do so.    Assessment   Romi Cortez is a 63 year old  White Not  or  female with psychiatric history of bipolar II disorder and ADHD who presented for a comprehensive assessment of psychiatric symptoms.  Romi was self-referred.   She has a history of no psychiatric hospitalizations.  Family history is significant for bipolar disorder.      Today, Romi presents as a fair historian with good insight in their current circumstances.     Diagnosis of bipolar II disorder and ADHD seems supported by patient report, collateral records, and the MINI 7.0.2.  Differential diagnosis of social anxiety.  Further diagnostic clarification is not needed.  There are no medical comorbidities which impact this treatment.    Romi has notable strengths, including high intelligence, capacity for insight, motivation for treatment, strong engagement in health care, high quality social relationships and empathy. Due to these strengths, I think Romi has the potential to find mental well-being and I feel optimistic that Romi will have a positive treatment outcome.  Psychosocial factors impacting treatment include relationship stress.  Romi  has evidence of functional impairment including difficulty with work. More specifically, she reported having difficulty .  Goal is to increase their functioning detailed above and assist Romi to make progress towards their goals. " " Romi identified the following factors that will help them succeed in recovery include family support. Things that may interfere with their success include stressors such as difficult relationships.     Billing for \"Interactive Complexity\"?    No    Plan   Next steps include intention of completing an medical assessment utilizing today's evaluation.    Medication Management: Romi is in need of Medication Management.  She will meet with JACK Chávez CNP on 5/15/2023 for medication management.    Therapy: Romi was interested in meeting with a therapist. Romi would benefit from therapy. She expressed an interest in learning more skills to manage her ADHD. She would prefer to meet in-person with a female provider.     Possible referrals:    In July,  a female learner in the clinic will be available under the supervision of Romi Hayden, PhD.     If she would prefer not to wait, there are several community options available:    [address]   [address]   [place], [place] [address]   Call Soni Beltre   [phone]   ADHD AND TRAUMA     [facility] --[facility], individual psychological support and therapy to biofeedback, EMDR (Eye Movement Desensitization and Reprocessing), and narrative therapies. To make an appointment, call [phone]     Princess Betancur PsyD.,    [phone]   [email or web address]   [facility]   [address], [address]   [place] [address]   Services: LD and ADHD assessments, counseling, mental health diagnoses and ADHD coaching.       Dylon Brito.SIMRAN., L.P.   [phone] / [phone]   [address]   [place]  [address]   [place]   [phone] [address], [address]   [place] [address]   Services:  LD (including second language learning) and ADHD assessments and counseling.     [facility]   ([place] or " [place])   [email or web address]   Assessment or ADHD CBT therapy     ADHD ADULT SUPPORT GROUPS   [facility]   [address], [place] [address]     Phone: [phone]   Fax: [phone]   Email: [email or web address]       Other Psychosocial Supports: Other social supports are not specifically recommended at this time.    Medical Referrals:     There are no additional medical referrals. Romi has previously been diagnosed with ALE, which may additionally affect her thinking and sleep quality. She should continue to follow recommendations from sleep medicine regarding this diagnosis.     Romi should continue to meet with her medical providers for ongoing medical care.       Referral information for the above mentioned supports will be discussed further at the Explanation of Findings visit.   Without the recommended intervention, Romi is likely to experience possible increase in psychotic symptoms requiring hospitalization.     TREATMENT RISK STATEMENT:  The risks, benefits, alternatives and potential adverse effects have been discussed and are understood by the pt. The pt understands the risks of using street drugs or alcohol. There are no medical contraindications, the pt agrees to treatment with the ability to do so. The pt knows to call the clinic for any problems or to access emergency care if needed.  Medical and substance use concerns are documented above.     PROVIDER: Lizett Zepeda, PhD    Supervisor is Alisa Richardson PsyD.

## 2023-05-11 ENCOUNTER — TELEPHONE (OUTPATIENT)
Dept: GASTROENTEROLOGY | Facility: CLINIC | Age: 63
End: 2023-05-11
Payer: COMMERCIAL

## 2023-05-11 NOTE — TELEPHONE ENCOUNTER
Caller: Romi Cortez     Reason for Reschedule/Cancellation (please be detailed, any staff messages or encounters to note?):     PT'S MOTHER PASSED       Prior to reschedule please review:    Ordering Provider:Mayra Persaud MD     Sedation per order:Moderate    Does patient have any ASC Exclusions, please identify?: Y, ALE        Notes on Cancelled Procedure:    Procedure:Lower Endoscopy [Colonoscopy]     Date: 04/3/23    Location:[facility]; [address] [place] [address]    Surgeon: RACQUEL        Rescheduled: YES     Procedure: Lower Endoscopy [Colonoscopy]    Date: 06/21/2023    Location:[facility]; [address], [place] [address]    Surgeon: NISHA     Sedation Level Scheduled  MODERATE          Reason for Sedation Level PER ORDER     Prep/Instructions updated and sent: ALMAZ

## 2023-05-12 NOTE — PROGRESS NOTES
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 organization, trouble concentrating, and distractibility. Some of these behaviors were observed during today's session. She reproted that these symptoms existed when she was in school, but she was never diagnosed because she was able to hide it well enough. However, now she finds that these issues get in the way of her to succeed in her career. I will maintain her diagnosis of Attention Deficit Hyperactivity Disorder, inattentive type. Importantly, there is some overlap in symptoms of ADHD and anxiety and depression, such as poor concentration, poor sleep, and fidgeting, and greater control of these symptoms may improve depressive symptoms as well.     In addition, she endorsed significant anxiety, both generally about several stressors in her life (her family's health, her work, her relationships) and more specifically related to social anxiety (performances centered around her). 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 When she is anxious, she described having trouble controlling her worry, poor sleep caused by worrying, feeling restless, muscle tension, and difficulty  "concentrating. Given that anxiety can exist within the context of depression, particularly as it relates to feelings of worthlessness, more information should be gathered to assess if this anxiety warrants an additional diagnosis outside the context of bipolar disorder.    Finally, while it does not meet diagnostic criteria for an eating disorder, the patient endorsed that she had an \"unhealthy\" relationship with food and had negative self-talk regarding her eating. This may be a useful topic to explore further in therapy if the patient desires to do so.    Assessment   Romi Cortez is a 63 year old  White Not  or  female with psychiatric history of bipolar II disorder and ADHD who presented for a comprehensive assessment of psychiatric symptoms.  Romi was self-referred.   She has a history of no psychiatric hospitalizations.  Family history is significant for bipolar disorder.      Today, Romi presents as a fair historian with good insight in their current circumstances.     Diagnosis of bipolar II disorder and ADHD seems supported by patient report, collateral records, and the MINI 7.0.2.  Differential diagnosis of social anxiety.  Further diagnostic clarification is not needed.  There are no medical comorbidities which impact this treatment.    Romi has notable strengths, including high intelligence, capacity for insight, motivation for treatment, strong engagement in health care, high quality social relationships and empathy. Due to these strengths, I think Romi has the potential to find mental well-being and I feel optimistic that Romi will have a positive treatment outcome.  Psychosocial factors impacting treatment include relationship stress.  Romi  has evidence of functional impairment including difficulty with work. More specifically, she reported having difficulty .  Goal is to increase their functioning detailed above and assist Romi to make progress towards their goals. " " Romi identified the following factors that will help them succeed in recovery include family support. Things that may interfere with their success include stressors such as difficult relationships.     Billing for \"Interactive Complexity\"?    No    Plan   Next steps include intention of completing an medical assessment utilizing today's evaluation.    Medication Management: Romi is in need of Medication Management.  She will meet with JACK Chávez CNP on 5/15/2023 for medication management.    Therapy: Romi was interested in meeting with a therapist. Romi would benefit from therapy. She expressed an interest in learning more skills to manage her ADHD. She would prefer to meet in-person with a female provider.     Possible referrals:    In July,  a learner in the clinic will be available under the supervision of Romi Hayden, PhD.     If she would prefer not to wait, there are several community options available:    [address]   [address]   [place], [place] [address]   Call Soni Beltre   [phone]   ADHD AND TRAUMA     [facility], individual psychological support and therapy to biofeedback, EMDR (Eye Movement Desensitization and Reprocessing), and narrative therapies. To make an appointment, call [phone]     Princess Betancur PsyD.,    [phone]   [email or web address]   [facility]   [address], [address]   [place] [address]   Services: LD and ADHD assessments, counseling, mental health diagnoses and ADHD coaching.       Avis Glover Psy.D., L.P.   [phone] / [phone]   [address]   [place]  [address]   OR   [phone] [address]   [place] [address]   Services:  LD (including second language learning) and ADHD assessments and counseling.     [facility]   ([place] or " Ernst   [email or web address]   Assessment or ADHD CBT therapy     ADHD ADULT SUPPORT GROUPS   [facility]   [address], [place] [address]     Phone: [phone]   Fax: [phone]   Email: [email or web address]       Other Psychosocial Supports: Other social supports are not specifically recommended at this time.    Medical Referrals:     There are no additional medical referrals. Romi has previously been diagnosed with ALE, which may additionally affect her thinking and sleep quality. She should continue to follow recommendations from sleep medicine regarding this diagnosis.     Romi should continue to meet with her medical providers for ongoing medical care.       Referral information for the above mentioned supports will be discussed further at the Explanation of Findings visit.   Without the recommended intervention, Romi is likely to experience possible increase in psychotic symptoms requiring hospitalization.     TREATMENT RISK STATEMENT:  The risks, benefits, alternatives and potential adverse effects have been discussed and are understood by the pt. The pt understands the risks of using street drugs or alcohol. There are no medical contraindications, the pt agrees to treatment with the ability to do so. The pt knows to call the clinic for any problems or to access emergency care if needed.  Medical and substance use concerns are documented above.     PROVIDER: Lizett Zepeda, PhD    Supervisor is Alisa Richardson PsyD.    I did not see this pt directly. This pt is discussed with me in individual psychotherapy supervision, and I agree with the plan as documented. Dylon Armendariz.[email or web address]. , L.P.

## 2023-05-14 ASSESSMENT — PATIENT HEALTH QUESTIONNAIRE - PHQ9
SUM OF ALL RESPONSES TO PHQ QUESTIONS 1-9: 7
10. IF YOU CHECKED OFF ANY PROBLEMS, HOW DIFFICULT HAVE THESE PROBLEMS MADE IT FOR YOU TO DO YOUR WORK, TAKE CARE OF THINGS AT HOME, OR GET ALONG WITH OTHER PEOPLE: VERY DIFFICULT
SUM OF ALL RESPONSES TO PHQ QUESTIONS 1-9: 7

## 2023-05-15 ENCOUNTER — VIRTUAL VISIT (OUTPATIENT)
Dept: PSYCHIATRY | Facility: CLINIC | Age: 63
End: 2023-05-15
Attending: NURSE PRACTITIONER
Payer: COMMERCIAL

## 2023-05-15 DIAGNOSIS — F90.0 ADHD (ATTENTION DEFICIT HYPERACTIVITY DISORDER), INATTENTIVE TYPE: Primary | ICD-10-CM

## 2023-05-15 PROCEDURE — 99215 OFFICE O/P EST HI 40 MIN: CPT | Mod: VID | Performed by: NURSE PRACTITIONER

## 2023-05-15 RX ORDER — SERTRALINE HYDROCHLORIDE 100 MG/1
150 TABLET, FILM COATED ORAL DAILY
Qty: 45 TABLET | Refills: 1 | Status: CANCELLED | OUTPATIENT
Start: 2023-05-15

## 2023-05-15 RX ORDER — LAMOTRIGINE 200 MG/1
200 TABLET ORAL DAILY
Qty: 30 TABLET | Refills: 0 | Status: CANCELLED | OUTPATIENT
Start: 2023-05-15

## 2023-05-15 NOTE — NURSING NOTE
Is the patient currently in the state of [place]? YES    Visit mode:VIDEO    If the visit is dropped, the patient can be reconnected by: VIDEO VISIT: Text to cell phone: [phone]    Will anyone else be joining the visit? NO      How would you like to obtain your AVS? MyChart    Are changes needed to the allergy or medication list? NO    Reason for visit: Video Visit  Patient declined individual allergy and medication review by support staff because pt completed e check in last night..  Declined to complete qnrs with vf, states she completed them all last night. Informed pt there are a few more to be completed, offered to complete them with her over the phone. Pt aware but declined. Pt said she now sees them in mychart and will try to complete most of them prior to apt.
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
## 2023-05-15 NOTE — PATIENT INSTRUCTIONS
**For crisis resources, please see the information at the end of this document**   Patient Education    Thank you for coming to the [facility] [place] [facility].     Lab Testing:  If you had lab testing today and your results are reassuring or normal they will be mailed to you or sent through [employer] within 7 days. If the lab tests need quick action we will call you with the results. The phone number we will call with results is # [phone]. If this is not the best number please call our clinic and change the number.     Medication Refills:  If you need any refills please call your pharmacy and they will contact us. Our fax number for refills is [phone].   Three business days of notice are needed for general medication refill requests.   Five business days of notice are needed for controlled substance refill requests.   If you need to change to a different pharmacy, please contact the new pharmacy directly. The new pharmacy will help you get your medications transferred.     Contact Us:  Please call [phone] during business hours (8-5:00 M-F).   If you have medication related questions after clinic hours, or on the weekend, please call [phone].     Financial Assistance [phone]   Medical Records [phone]       MENTAL HEALTH CRISIS RESOURCES:  For a emergency help, please call 911 or go to the nearest Emergency Department.     Emergency Walk-In Options:   EmPATH Unit @ [facility] ([place]): [phone] - Specialized mental health emergency area designed to be calming  [facility] ([place]): [phone]  [facility] ([place]): [phone]  [facility]): [phone]    [facility] Crisis Information:   [place]: [phone]  [place]: [phone]  [place] ([place]) - Adult: [phone]     Child: [phone]  [place] - Adult: [phone]     Child: [phone]  [place]:  [phone]  List of all [facility] resources:   https://[place].gov/dhs/people-we-serve/adults/health-care/mental-health/resources/crisis-contacts.jsp    National Crisis Information:   Crisis Text Line: Text  [place]  to 450601  Suicide & Crisis Lifeline: 988  National Suicide Prevention Lifeline: 1-418-401-TALK (1-320.380.9604)       For online chat options, visit https://suicidepreventionlifeline.org/chat/  Poison Control Center: 5-887-115-2652  Trans Lifeline: 6-233-063-0588 - Hotline for transgender people of all ages  The Yovani Project: 0-585-136-2094 - Hotline for LGBT youth     For Non-Emergency Support:   Fast Tracker: Mental Health & Substance Use Disorder Resources -   https://www.[employer]RightScalen.org/

## 2023-05-17 ENCOUNTER — TELEPHONE (OUTPATIENT)
Dept: GASTROENTEROLOGY | Facility: CLINIC | Age: 63
End: 2023-05-17
Payer: COMMERCIAL

## 2023-05-17 RX ORDER — ATOMOXETINE 40 MG/1
40 CAPSULE ORAL DAILY
Qty: 30 CAPSULE | Refills: 0 | Status: SHIPPED | OUTPATIENT
Start: 2023-05-17 | End: 2023-06-26

## 2023-05-17 RX ORDER — DESVENLAFAXINE 25 MG/1
25 TABLET, EXTENDED RELEASE ORAL DAILY
Qty: 30 TABLET | Refills: 0 | Status: SHIPPED | OUTPATIENT
Start: 2023-05-17 | End: 2023-06-14

## 2023-05-17 NOTE — TELEPHONE ENCOUNTER
Caller: Natacha  Reason for Reschedule/Cancellation (please be detailed, any staff messages or encounters to note?):[facility] Scheduling Pool  Phone Number: [phone]     Very sorry, but my parents' memorial has been scheduled for the same week and we will have guests here June 17-21.   I would like to reschedule this as soon as possible. Many thanks.   Romi   [phone]         Prior to reschedule please review:    Ordering Provider:Mayra Persaud    Sedation per order:moderate    Does patient have any ASC Exclusions, please identify?: y randy      Notes on Cancelled Procedure:    Procedure:Lower Endoscopy [Colonoscopy]     Date: 6/21    Location:[facility]; [address] [facility] [facility], [address] [place] [address]    Surgeon: Tony        Rescheduled: Yes    Procedure: Lower Endoscopy [Colonoscopy]     Date: 7/6    Location:[facility]; [address], [address] [place] [address]    Surgeon: RAYNE Elizabeth    Sedation Level Scheduled  moderate,  Reason for Sedation Level on block    Prep/Instructions updated and sent: maría

## 2023-05-31 ENCOUNTER — TELEPHONE (OUTPATIENT)
Dept: GASTROENTEROLOGY | Facility: CLINIC | Age: 63
End: 2023-05-31
Payer: COMMERCIAL

## 2023-05-31 NOTE — TELEPHONE ENCOUNTER
Caller: Romi Cortez    Reason for Reschedule/Cancellation (please be detailed, any staff messages or encounters to note?): Provider out, [facility] for call back to reschedule MyChart message sent, case moved into depo.      Prior to reschedule please review:    Ordering Provider:Mayra Persaud MD    Sedation per order:MODERATE    Does patient have any ASC Exclusions, please identify?: YES, ALE      Notes on Cancelled Procedure:    Procedure:Lower Endoscopy [Colonoscopy]     Date: 7/6    Location:[facility]; [address] [facility], [address], [place] [address]    Surgeon: BRADY MCLAIN        Rescheduled: No

## 2023-06-02 NOTE — TELEPHONE ENCOUNTER
Caller: Romi Cortez      Rescheduled: Yes    Procedure: Lower Endoscopy [Colonoscopy]     Date: 8/9    Location:[facility]; [address] [facility], [address]rd [address], [place] [address]    Surgeon: NISHA    Sedation Level Scheduled  MODERATE,  Reason for Sedation Level PER ORDER    Prep/Instructions updated and sent: ALMAZ

## 2023-06-14 DIAGNOSIS — F90.0 ADHD (ATTENTION DEFICIT HYPERACTIVITY DISORDER), INATTENTIVE TYPE: ICD-10-CM

## 2023-06-14 RX ORDER — DESVENLAFAXINE 25 MG/1
25 TABLET, EXTENDED RELEASE ORAL DAILY
Qty: 30 TABLET | Refills: 0 | Status: SHIPPED | OUTPATIENT
Start: 2023-06-14 | End: 2023-06-26

## 2023-06-14 NOTE — TELEPHONE ENCOUNTER
Medication requested: DESVENLAFAXINE ER SUCCINATE 25MG T  Last refilled: 5/17/23  Qty: 30      Last seen: 5/15/23  RTC: 4 weeks  Cancel: 0  No-show: 0  Next appt: 6/26/23    Refill decision:   Refilled for 30 days per protocol.

## 2023-06-26 ENCOUNTER — VIRTUAL VISIT (OUTPATIENT)
Dept: PSYCHIATRY | Facility: CLINIC | Age: 63
End: 2023-06-26
Attending: NURSE PRACTITIONER
Payer: COMMERCIAL

## 2023-06-26 DIAGNOSIS — F90.0 ADHD (ATTENTION DEFICIT HYPERACTIVITY DISORDER), INATTENTIVE TYPE: ICD-10-CM

## 2023-06-26 DIAGNOSIS — F31.81 BIPOLAR II DISORDER (H): Primary | ICD-10-CM

## 2023-06-26 PROCEDURE — 99214 OFFICE O/P EST MOD 30 MIN: CPT | Mod: VID | Performed by: NURSE PRACTITIONER

## 2023-06-26 RX ORDER — AMLODIPINE BESYLATE 5 MG/1
1 TABLET ORAL
COMMUNITY
Start: 2023-04-19

## 2023-06-26 RX ORDER — DESVENLAFAXINE 50 MG/1
50 TABLET, FILM COATED, EXTENDED RELEASE ORAL DAILY
Qty: 30 TABLET | Refills: 1 | Status: SHIPPED | OUTPATIENT
Start: 2023-06-26 | End: 2023-08-15

## 2023-06-26 RX ORDER — ATOMOXETINE 40 MG/1
40 CAPSULE ORAL DAILY
Qty: 30 CAPSULE | Refills: 1 | Status: SHIPPED | OUTPATIENT
Start: 2023-06-26 | End: 2023-08-23

## 2023-06-26 RX ORDER — LAMOTRIGINE 200 MG/1
200 TABLET ORAL DAILY
Qty: 30 TABLET | Refills: 1 | Status: SHIPPED | OUTPATIENT
Start: 2023-06-26 | End: 2023-08-16
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
## 2023-06-26 NOTE — PATIENT INSTRUCTIONS
**For crisis resources, please see the information at the end of this document**   Patient Education    Thank you for coming to the [facility] [place] [facility].     Lab Testing:  If you had lab testing today and your results are reassuring or normal they will be mailed to you or sent through [employer] within 7 days. If the lab tests need quick action we will call you with the results. The phone number we will call with results is # [phone]. If this is not the best number please call our clinic and change the number.     Medication Refills:  If you need any refills please call your pharmacy and they will contact us. Our fax number for refills is [phone].   Three business days of notice are needed for general medication refill requests.   Five business days of notice are needed for controlled substance refill requests.   If you need to change to a different pharmacy, please contact the new pharmacy directly. The new pharmacy will help you get your medications transferred.     Contact Us:  Please call [phone] during business hours (8-5:00 M-F).   If you have medication related questions after clinic hours, or on the weekend, please call [phone].     Financial Assistance [phone]   Medical Records [phone]       MENTAL HEALTH CRISIS RESOURCES:  For a emergency help, please call 911 or go to the nearest Emergency Department.     Emergency Walk-In Options:   EmPATH Unit @ [facility] ([place]): [phone] - Specialized mental health emergency area designed to be calming  [facility] ([place]): [phone]  [facility] ([place]): [phone]  [facility]): [phone]    [facility] Crisis Information:   [place]: [phone]  [place]: [phone]  [place] ([place]) - Adult: [phone]     Child: [phone]  [place] - Adult: [phone]     Child: [phone]  [place]:  [phone]  List of all [facility] resources:   https://[place].gov/dhs/people-we-serve/adults/health-care/mental-health/resources/crisis-contacts.jsp    National Crisis Information:   Crisis Text Line: Text  [place]  to 400302  Suicide & Crisis Lifeline: 988  National Suicide Prevention Lifeline: 6-851-424-TALK (1-179.177.1891)       For online chat options, visit https://suicidepreventionlifeline.org/chat/  Poison Control Center: 0-428-199-1941  Trans Lifeline: 1-643-946-8748 - Hotline for transgender people of all ages  The [employer] Project: 9-447-063-2504 - Hotline for LGBT youth     For Non-Emergency Support:   Fast Tracker: Mental Health & Substance Use Disorder Resources -   https://www.[employer]top[employer]uniRown.org/

## 2023-06-26 NOTE — NURSING NOTE
Is the patient currently in the state of [place]? YES - at home.    Visit mode:VIDEO    If the visit is dropped, the patient can be reconnected by: VIDEO VISIT:  Send e-mail to at [email or web address]    Will anyone else be joining the visit? No  (If patient encounters technical issues they should call [phone])    How would you like to obtain your AVS? MyChart    Are changes needed to the allergy or medication list? NO    Rooming Documentation: Patient declined to complete qnrs with VF.       Reason for visit: RECHJ CARLOS Mcmanus, VVF

## 2023-06-28 ENCOUNTER — OFFICE VISIT (OUTPATIENT)
Dept: SLEEP MEDICINE | Facility: CLINIC | Age: 63
End: 2023-06-28
Payer: COMMERCIAL

## 2023-06-28 VITALS
SYSTOLIC BLOOD PRESSURE: 131 MMHG | DIASTOLIC BLOOD PRESSURE: 86 MMHG | OXYGEN SATURATION: 98 % | BODY MASS INDEX: 33.44 KG/M2 | HEART RATE: 78 BPM | HEIGHT: 66 IN | WEIGHT: 208.06 LBS

## 2023-06-28 DIAGNOSIS — G47.33 OBSTRUCTIVE SLEEP APNEA SYNDROME: Primary | ICD-10-CM

## 2023-06-28 PROBLEM — I10 ESSENTIAL (PRIMARY) HYPERTENSION: Status: ACTIVE | Noted: 2022-11-18

## 2023-06-28 PROBLEM — F31.81 BIPOLAR II DISORDER, MOST RECENT EPISODE MAJOR DEPRESSIVE (H): Status: ACTIVE | Noted: 2021-12-19

## 2023-06-28 PROBLEM — M26.609 UNSPECIFIED TEMPOROMANDIBULAR JOINT DISORDER, UNSPECIFIED SIDE: Status: ACTIVE | Noted: 2019-01-28

## 2023-06-28 PROBLEM — G47.30 SLEEP APNEA: Status: ACTIVE | Noted: 2018-04-13

## 2023-06-28 PROCEDURE — 99205 OFFICE O/P NEW HI 60 MIN: CPT | Performed by: NURSE PRACTITIONER

## 2023-06-28 RX ORDER — ZOLPIDEM TARTRATE 5 MG/1
TABLET ORAL
Qty: 1 TABLET | Refills: 0 | Status: SHIPPED | OUTPATIENT
Start: 2023-06-28 | End: 2023-10-27

## 2023-06-28 ASSESSMENT — SLEEP AND FATIGUE QUESTIONNAIRES

## 2023-06-28 NOTE — PROGRESS NOTES
Outpatient Sleep Medicine Consultation:      Name: Romi Cortez MRN# [id number]   Age: 63 year old YOB: 1960     Date of Consultation: June 28, 2023  Consultation is requested by: No referring provider defined for this encounter. No ref. provider found  Primary care provider: Mayra Persaud       Reason for Sleep Consult:     Romi Cortez is A Self-Referred Patient to the Sleep Medicine Center      Patient s Reason for visit  Romi Cortez main reason for visit: apnea-  dental appliance broke, need new provider  Patient states problem(s) started: broke over a year ago  Romi Cortez's goals for this visit: find out severity of apnea, find some solutions           Assessment and Plan:     Summary Sleep Diagnoses:  Obstructive sleep apnea    Comorbid Diagnoses:  Hypertension  Unspecified temporomandibular joint disorder  Bipolar affective disorder, currently depressed, moderate  Bipolar 2 disorder, most recent episode major depressive  Depressive disorder    Summary Recommendations:  Orders Placed This Encounter   Procedures    Comprehensive Sleep Study     1.  Recommend patient undergo repeat sleep testing.  Patient known obstructive sleep apnea.  Would need to know current level of her obstructive sleep apnea.  PSG ordered.  2.  Recommend patient return to clinic approximately 2 weeks after sleep study has been completed.  At that time we will review the results as well as to determine plan of care going forward.  3.  Recommend patient optimize sleep schedule as well as his sleep hygiene practices.  This will mitigate any future sleep intrusion.  4.  Recommend patient employ safe driving practices such as not driving a car if drowsy.  5.  Weight management to BMI of 30    Summary Counseling:    Sleep Testing Reviewed: PSG  Obstructive Sleep Apnea Reviewed   -Discussed pathophysiology of obstructive sleep apnea as well as central sleep apnea   -Discussed all methods of  treatment of sleep apnea including PAP therapy, mandibular advancement device, surgical options  Complications of Untreated Sleep Apnea Reviewed in the context of his medical diagnoses    Medical Decision-making:   Educational materials provided in instructions    Total time spent reviewing medical records, history and physical examination, review of previous testing and interpretation as well as documentation on this date: 60 minutes    CC: No ref. provider found          History of Present Illness:     Romi Cortez is a 63-year-old female who presents herself to the sleep medicine as the mandibular advancement device that she had been using broke over a year ago and she is interested in finding out the severity of her sleep apnea.    Patient acknowledges snort arousals, multiple nocturnal awakenings for elimination purposes as well as snort arousals, socially disruptive snoring, witnessed episodes of apnea, nasal congestion upon awakening, bruxism, hypnagogic hallucinations.    Plans for Romi include a split-night PSG as well as a return visit to the clinic approximately 2 weeks afterward so we can discuss her results as well as collaborate and determine the next steps in her plan of care.      Past Sleep Evaluations:    4/5/2019 [place] Titration Sleep Study (216.0 lbs) - titration with dental appliance with 2 sets of adjustments during the study with an AHI of 28.6. Time Spent in REM supine at this pressure was 55.0.       7/6/2018 [place] Split Sleep Study (214.0 lbs) - AHI 40.4, RDI 79.6, Supine AHI 40.4, REM AHI 82.9, Low O2% 81.0%, Time Spent ?88% 2.0, Time Spent ?89% 3.0. Treatment was titrated to a pressure of CPAP 5 with an AHI 12.7. Time spent in REM supine at this pressure was 56.0 minutes.       SLEEP-WAKE SCHEDULE:     Work/School Days: Patient goes to school/work: No   Usually gets into bed at 1:00 am?  Takes patient about depends to fall asleep  Has trouble falling asleep unsure nights per  week  Wakes up in the middle of the night fairly ofteb,usually go back to sleep times.  Wakes up due to Snorting self awake;External stimuli (bed partner, pets, noise, etc);Use the bathroom;Anxiety;Uncertain  She has trouble falling back asleep 4-5nights? times a week.   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   Takes patient about depends to fall asleep  Patient is usually up at    Uses alarm: Yes    Sleep Need  Patient gets  6-8? sleep on average   Patient thinks she needs about 8 sleep    Romi Cortez prefers to sleep in this position(s): Side   Patient states they do the following activities in bed: Read;Use phone, computer, or tablet    Naps  Patient takes a purposeful nap   times a week and naps are usually cant nap in duration  She feels better after a nap: No  She dozes off unintentionally rarely days per week  Patient has had a driving accident or near-miss due to sleepiness/drowsiness: No      SLEEP DISRUPTIONS:    Breathing/Snoring  Patient snores:Yes  Other people complain about her snoring: Yes  Patient has been told she stops breathing in her sleep:Yes  She has issues with the following: Stuffy nose when you wake up;Getting up to urinate more than once    Movement:  Patient gets pain, discomfort, with an urge to move:  Yes  It happens when she is resting:  No  It happens more at night:  Yes  Patient has been told she kicks her legs at night:  Yes     Behaviors in Sleep:  Romi Cortez has experienced the following behaviors while sleeping: Teeth grinding;See or hear things that are not really there upon awakening or just falling asleep  She has experienced sudden muscle weakness during the day: No      Is there anything else you would like your sleep provider to know: here about snoring and apnea        CAFFEINE AND OTHER  SUBSTANCES:    Patient consumes caffeinated beverages per day:  2strong cup of coffee before 2pm  Last caffeine use is usually: 2  List of any prescribed or over the counter stimulants that patient takes: ?  List of any prescribed or over the counter sleep medication patient takes: nothing  List of previous sleep medications that patient has tried: benadryl on train years ago  Patient drinks alcohol to help them sleep: No  Patient drinks alcohol near bedtime: Yes    Family History:  Patient has a family member been diagnosed with a sleep disorder: Yes  brothers  father         SCALES:    EPWORTH SLEEPINESS SCALE         6/28/2023    10:23 AM    Linn Sleepiness Scale ( VANDANA Witt  [id number]<br>ESS - [place]/English - Final version - 21 Nov 07 - [facility] [employer].)   Sitting and reading Slight chance of dozing   Watching TV Slight chance of dozing   Sitting, inactive in a public place (e.g. a theatre or a meeting) Slight chance of dozing   As a passenger in a car for an hour without a break Moderate chance of dozing   Lying down to rest in the afternoon when circumstances permit Slight chance of dozing   Sitting and talking to someone Would never doze   Sitting quietly after a lunch without alcohol Would never doze   In a car, while stopped for a few minutes in traffic Would never doze   Linn Score (MC) 6   Linn Score (Sleep) 6         INSOMNIA SEVERITY INDEX (ASHLEE)          6/28/2023    10:07 AM   Insomnia Severity Index (ASHLEE)   Difficulty falling asleep 1   Difficulty staying asleep 2   Problems waking up too early 1   How SATISFIED/DISSATISFIED are you with your CURRENT sleep pattern? 3   How NOTICEABLE to others do you think your sleep problem is in terms of impairing the quality of your life? 1   How WORRIED/DISTRESSED are you about your current sleep problem? 2   To what extent do you consider your sleep problem to INTERFERE with your daily functioning (e.g. daytime fatigue, mood, ability to function  "at work/daily chores, concentration, memory, mood, etc.) CURRENTLY? 1   ASHLEE Total Score 11       Guidelines for Scoring/Interpretation:  Total score categories:  0-7 = No clinically significant insomnia   8-14 = Subthreshold insomnia   15-21 = Clinical insomnia (moderate severity)  22-28 = Clinical insomnia (severe)  Used via courtesy of www.[employer].va.gov with permission from Nate Jean PhD., [facility]      STOP BANG         6/28/2023    10:29 AM   STOP BANG Questionnaire (  2008, the American Society of Anesthesiologists, Inc. [employer]   Neck Cir (cm) Clinic: 37 cm   B/P Clinic: 131/86   BMI Clinic: 33.58         GAD7        5/14/2023    11:04 PM   NAPOLEON-7    1. Feeling nervous, anxious, or on edge 3   2. Not being able to stop or control worrying 2   3. Worrying too much about different things 3   4. Trouble relaxing 3   5. Being so restless that it is hard to sit still 1   6. Becoming easily annoyed or irritable 1   7. Feeling afraid, as if something awful might happen 2   NAPOLEON-7 Total Score 15   If you checked any problems, how difficult have they made it for you to do your work, take care of things at home, or get along with other people? Very difficult         CAGE-AID         No data to display                CAGE-AID reprinted with permission from the [place] Medical Journal, JHON Delarosa. and MARY KATE Schmitz, \"Conjoint screening questionnaires for alcohol and drug abuse\" [place] Medical Journal 94: 135-140, 1995.      PATIENT HEALTH QUESTIONNAIRE-9 (PHQ - 9)        5/14/2023    11:03 PM   PHQ-9 ([employer])   1.  Little interest or pleasure in doing things 0    0   2.  Feeling down, depressed, or hopeless 1    1   3.  Trouble falling or staying asleep, or sleeping too much 0    0   4.  Feeling tired or having little energy 1    1   5.  Poor appetite or overeating 1    1   6.  Feeling bad about yourself - or that you are a failure or have let yourself or your family down 2    " 2   7.  Trouble concentrating on things, such as reading the newspaper or watching television 1    1   8.  Moving or speaking so slowly that other people could have noticed. Or the opposite - being so fidgety or restless that you have been moving around a lot more than usual 1    1   9.  Thoughts that you would be better off dead, or of hurting yourself in some way 0    0   PHQ-9 Total Score 7    7   6.  Feeling bad about yourself 2    2   7.  Trouble concentrating 1    1   8.  Moving slowly or restless 1    1   9.  Suicidal or self-harm thoughts 0    0   1.  Little interest or pleasure in doing things Not at all   2.  Feeling down, depressed, or hopeless Several days   3.  Trouble falling or staying asleep, or sleeping too much Not at all   4.  Feeling tired or having little energy Several days   5.  Poor appetite or overeating Several days   6.  Feeling bad about yourself More than half the days   7.  Trouble concentrating Several days   8.  Moving slowly or restless Several days   9.  Suicidal or self-harm thoughts Not at all   PHQ-9 via [facility] TOTAL SCORE-----> 7 (Mild depression)   Difficulty at work, home, or with people Very difficult       Developed by DrsTanya Lopez, Nuzhat Powers, Ralf Harrison and colleagues, with an educational nic from [employer] No permission required to reproduce, translate, display or distribute.        Allergies:    No Known Allergies    Medications:    Current Outpatient Medications   Medication Sig Dispense Refill    amLODIPine (NORVASC) 5 MG tablet Take 1 tablet by mouth daily at 2 pm      atomoxetine (STRATTERA) 40 MG capsule Take 1 capsule (40 mg) by mouth daily 30 capsule 1    desvenlafaxine succinate (PRISTIQ) 50 MG 24 hr tablet Take 1 tablet (50 mg) by mouth daily 30 tablet 1    lamoTRIgine (LAMICTAL) 200 MG tablet Take 1 tablet (200 mg) by mouth daily 30 tablet 1    zolpidem (AMBIEN) 5 MG tablet Take tablet by mouth 15 minutes prior to sleep, for Sleep  Study 1 tablet 0    bisacodyl (DULCOLAX) 5 MG EC tablet Take 2 tablets at 3 pm the day before your procedure. If your procedure is before 11 am, take 2 additional tablets at 11 pm. If your procedure is after 11 am, take 2 additional tablets at 6 am. For additional instructions refer to your colonoscopy prep instructions. (Patient not taking: Reported on 6/26/2023) 4 tablet 0    bisacodyl (DULCOLAX) 5 MG EC tablet Take 2 tablets at 3 pm the day before your procedure. If your procedure is before 11 am, take 2 additional tablets at 11 pm. If your procedure is after 11 am, take 2 additional tablets at 6 am. For additional instructions refer to your colonoscopy prep instructions. 4 tablet 0    clobetasol (TEMOVATE) 0.05 % external ointment Apply topically 2 times daily Apply twice daily to areas of rash and fissuring on the hands and fingers. Use Vaseline over the entire hand at night and then cover in white gloves. (Patient not taking: Reported on 6/28/2023) 60 g 3    diclofenac (VOLTAREN) 1 % topical gel Apply 4 g topically 4 times daily as needed for moderate pain (Patient not taking: Reported on 6/28/2023) 350 g 0    polyethylene glycol (GOLYTELY) 236 g suspension The night before the exam at 6 pm drink an 8-ounce glass every 15 minutes until the jug is half empty. If you arrive before 11 AM: Drink the other half of the Golytely jug at 11 PM night before procedure. If you arrive after 11 AM: Drink the other half of the Golytely jug at 6 AM day of procedure. For additional instructions refer to your colonoscopy prep instructions. (Patient not taking: Reported on 6/26/2023) 4000 mL 0    polyethylene glycol (GOLYTELY) 236 g suspension The night before the exam at 6 pm drink an 8-ounce glass every 15 minutes until the jug is half empty. If you arrive before 11 AM: Drink the other half of the Golytely jug at 11 PM night before procedure. If you arrive after 11 AM: Drink the other half of the Golytely jug at 6 AM day of  procedure. For additional instructions refer to your colonoscopy prep instructions. 4000 mL 0    triamcinolone (KENALOG) 0.025 % ointment Apply topically 2 times daily (Patient not taking: Reported on 6/28/2023)      triamcinolone (KENALOG) 0.1 % external ointment Apply topically 2 times daily To your affected areas on your hands and feet (Patient not taking: Reported on 6/26/2023) 80 g 3       Problem List:  Patient Active Problem List    Diagnosis Date Noted    Essential (primary) hypertension 11/18/2022     Priority: Medium    Traumatic complete tear of right rotator cuff, subsequent encounter 01/14/2022     Priority: Medium     Added automatically from request for surgery [id number]      Bipolar II disorder, most recent episode major depressive (H) 12/19/2021     Priority: Medium    Unspecified temporomandibular joint disorder, unspecified side 01/28/2019     Priority: Medium     Formatting of this note might be different from the original.  Overview Note: TEMPOROMANDIBULAR JOINT DISORDER, UNSPECIFIED #[id number]#    EXT_ID: [id number]      Sleep apnea 04/13/2018     Priority: Medium     Formatting of this note might be different from the original.  Overview Note: SLEEP APNEA #[id number]#    EXT_ID: [id number]      Injury of forearm muscle or tendon, unspecified laterality, subsequent encounter 08/01/2016     Priority: Medium    Bipolar affective disorder, currently depressed, moderate (H) 01/13/2016     Priority: Medium     Formatting of this note might be different from the original.  Overview Note: Other bipolar disorders #[id number]#    EXT_ID: [id number]      Dermatitis, seborrheic 09/15/2015     Priority: Medium    AK (actinic keratosis) 09/15/2015     Priority: Medium    Senile sebaceous gland hyperplasia 09/15/2015     Priority: Medium    Lentigines 09/15/2015     Priority: Medium    Appendicitis 11/21/2013     Priority: Medium    Depressive disorder 10/07/2005     Priority: Medium        Past Medical/Surgical History:  No past  medical history on file.  Past Surgical History:   Procedure Laterality Date    ARTHROSCOPY SHOULDER ROTATOR CUFF REPAIR Right 3/22/2022    Procedure: Right arthroscopic cuff repair, subacromial decompression, biceps tenotomy;  Surgeon: Belinda Cazares MD;  Location: [facility]    D & C  1996    LAPAROSCOPIC APPENDECTOMY  11/21/2013    Procedure: LAPAROSCOPIC APPENDECTOMY;  Laparoscopic Appendectomy;  Surgeon: Sung Rodriguez MD;  Location:  OR       Social History:  Social History     Socioeconomic History    Marital status:      Spouse name: Not on file    Number of children: Not on file    Years of education: Not on file    Highest education level: Not on file   Occupational History    Not on file   Tobacco Use    Smoking status: Never    Smokeless tobacco: Never   Substance and Sexual Activity    Alcohol use: Yes     Comment: 2/week    Drug use: No    Sexual activity: Not on file   Other Topics Concern    Parent/sibling w/ CABG, MI or angioplasty before 65F 55M? Not Asked   Social History Narrative    Not on file     Social Determinants of Health     Financial Resource Strain: Not on file   Food Insecurity: Not on file   Transportation Needs: Not on file   Physical Activity: Not on file   Stress: Not on file   Social Connections: Not on file   Intimate Partner Violence: Not on file   Housing Stability: Not on file       Family History:  Family History   Problem Relation Age of Onset    Cancer Mother         skin cancer    Glaucoma Mother     Skin Cancer Mother     Melanoma Brother        Review of Systems:  A complete review of systems reviewed by me is negative with the exeption of what has been mentioned in the history of present illness.  In the last TWO WEEKS have you experienced any of the following symptoms?  Fevers: No  Night Sweats: Yes  Weight Gain: No  Sore Throat in Morning: Yes  Dry Mouth in the Morning: No  Heart Racing at Night: No  Swelling in Feet or Legs: No  Losing Control  "of Urine at Night: No  Joint Pains at Night: Yes  Headaches in Morning: No  Weakness in Arms or Legs: No  Depressed Mood: No  Anxiety: Yes     Physical Examination:  Vitals: /86   Pulse 78   Ht 1.676 m (5' 6\")   Wt 94.4 kg (208 lb 1 oz)   SpO2 98%   BMI 33.58 kg/m    BMI= Body mass index is 33.58 kg/m .    Neck Cir (cm): 37 cm (14.5)      Physical Exam  Vitals and nursing note reviewed.   Constitutional:       General: She is awake. She is not in acute distress.     Appearance: Normal appearance. She is well-developed and well-groomed. She is obese. She is not ill-appearing, toxic-appearing or diaphoretic.   HENT:      Head: Normocephalic and atraumatic.      Right Ear: External ear normal.      Left Ear: External ear normal.      Nose: Nose normal.      Mouth/Throat:      Pharynx: Oropharynx is clear.   Eyes:      Conjunctiva/sclera: Conjunctivae normal.   Cardiovascular:      Rate and Rhythm: Normal rate and regular rhythm.      Pulses: Normal pulses.      Heart sounds: Normal heart sounds.   Pulmonary:      Effort: Pulmonary effort is normal.      Breath sounds: Normal breath sounds.   Musculoskeletal:         General: Normal range of motion.      Cervical back: Normal range of motion and neck supple.   Skin:     General: Skin is warm and dry.      Capillary Refill: Capillary refill takes less than 2 seconds.   Neurological:      General: No focal deficit present.      Mental Status: She is alert and oriented to person, place, and time.   Psychiatric:         Mood and Affect: Mood normal.         Behavior: Behavior normal. Behavior is cooperative.         Thought Content: Thought content normal.         Judgment: Judgment normal.                  Data: All pertinent previous laboratory data reviewed     No results for input(s): NA, POTASSIUM, CHLORIDE, CO2, ANIONGAP, GLC, BUN, CR, KATELYN in the last 67102 hours.    No results for input(s): WBC, RBC, HGB, HCT, MCV, MCH, MCHC, RDW, PLT in the last 91423 " hours.    No results for input(s): PROTTOTAL, ALBUMIN, BILITOTAL, ALKPHOS, AST, ALT, BILIDIRECT in the last 36188 hours.    No results found for: TSH    No results found for: UAMP, UBARB, BENZODIAZEUR, UCANN, UCOC, OPIT, UPCP    No results found for: IRONSAT, [id number], KIRTI    No results found for: PH, PHARTERIAL, PO2, [id number], SAT, PCO2, HCO3, BASEEXCESS, ABUNDIO, BEB    @LABRCNTIPR(phv:4,pco2v:4,po2v:4,hco3v:4,jim:4,o2per:4)@    Echocardiology: No results found for this or any previous visit (from the past 4320 hour(s)).    Chest x-ray: No results found for this or any previous visit from the past 365 days.      Chest CT: No results found for this or any previous visit from the past 365 days.      PFT: Most Recent [place] Pulmonary Function Testing    No results found for: 20001  No results found for: 20002  No results found for: 20003  No results found for: 20015  No results found for: 20016  No results found for: 20027  No results found for: 20028  No results found for: 20029  No results found for: 20079  No results found for: 20080  No results found for: 20081  No results found for: 20335  No results found for: 20105  No results found for: 20053  No results found for: 20054  No results found for: 20055      JACK Abraham CNP 6/28/2023   Sleep Medicine    This note was written with the assistance of the Dragon voice-dictation technology software. The final document, although reviewed, may contain errors. For corrections, please contact the office.

## 2023-06-28 NOTE — NURSING NOTE
"Chief Complaint   Patient presents with     Consult     Needs new oral appliance        Initial /86   Pulse 78   Ht 1.676 m (5' 6\")   Wt 94.4 kg (208 lb 1 oz)   SpO2 98%   BMI 33.58 kg/m   Estimated body mass index is 33.58 kg/m  as calculated from the following:    Height as of this encounter: 1.676 m (5' 6\").    Weight as of this encounter: 94.4 kg (208 lb 1 oz).    Medication Reconciliation: complete    Neck circumference: 14.5 inches / 37 centimeters.    DME:     ANGELA [facility]      "

## 2023-06-28 NOTE — NURSING NOTE
Sleep study and return visit has been scheduled. AVS and PSG packet handouts given to patient.     Jovi Luo [facility] [place]

## 2023-07-21 DIAGNOSIS — F90.0 ADHD (ATTENTION DEFICIT HYPERACTIVITY DISORDER), INATTENTIVE TYPE: ICD-10-CM

## 2023-07-24 RX ORDER — DESVENLAFAXINE 25 MG/1
TABLET, EXTENDED RELEASE ORAL
Qty: 30 TABLET | Refills: 0 | OUTPATIENT
Start: 2023-07-24

## 2023-07-25 NOTE — PATIENT INSTRUCTIONS
"          MY TREATMENT INFORMATION FOR SLEEP APNEA-  Romi Cortez    DOCTOR : JACK Abraham CNP    Am I having a sleep study at a sleep center?  --->Due to normal delays, you will be contacted within 2-4 weeks to schedule    Am I having a home sleep study?  --->Watch the video for the device you are using:    -/drop off device-   https://www.[employer] Stop.com/watch?v=yGGFBdELGhk    -Disposable device sent out require phone/computer application-   https://www.[employer].com/watch?v=BCce_vbiwxE      Frequently asked questions:  1. What is Obstructive Sleep Apnea (ALE)? ALE is the most common type of sleep apnea. Apnea means, \"without breath.\"  Apnea is most often caused by narrowing or collapse of the upper airway as muscles relax during sleep.   Almost everyone has occasional apneas. Most people with sleep apnea have had brief interruptions at night frequently for many years.  The severity of sleep apnea is related to how frequent and severe the events are.   2. What are the consequences of ALE? Symptoms include: feeling sleepy during the day, snoring loudly, gasping or stopping of breathing, trouble sleeping, and occasionally morning headaches or heartburn at night.  Sleepiness can be serious and even increase the risk of falling asleep while driving. Other health consequences may include development of high blood pressure and other cardiovascular disease in persons who are susceptible. Untreated ALE  can contribute to heart disease, stroke and diabetes.   3. What are the treatment options? In most situations, sleep apnea is a lifelong disease that must be managed with daily therapy. Medications are not effective for sleep apnea and surgery is generally not considered until other therapies have been tried. Your treatment is your choice . Continuous Positive Airway (CPAP) works right away and is the therapy that is effective in nearly everyone. An oral device to hold your jaw forward is usually the " next most reliable option. Other options include postioning devices (to keep you off your back), weight loss, and surgery including a tongue pacing device. There is more detail about some of these options below.  4. Are my sleep studies covered by insurance? Although we will request verification of coverage, we advise you also check in advance of the study to ensure there is coverage.    Important tips for those choosing CPAP and similar devices  For new devices, sign up for device LADI to monitor your device for your followup visits  We encourage you to utilize the [employer] ladi or website (myAir web (resmed.com) ) to monitor your therapy progress and share the data with your healthcare team when you discuss your sleep apnea.                                                    Know your equipment:  CPAP is continuous positive airway pressure that prevents obstructive sleep apnea by keeping the throat from collapsing while you are sleeping. In most cases, the device is  smart  and can slowly self-adjusts if your throat collapses and keeps a record every day of how well you are treated-this information is available to you and your care team.  BPAP is bilevel positive airway pressure that keeps your throat open and also assists each breath with a pressure boost to maintain adequate breathing.  Special kinds of BPAP are used in patients who have inadequate breathing from lung or heart disease. In most cases, the device is  smart  and can slowly self-adjusts to assist breathing. Like CPAP, the device keeps a record of how well you are treated.  Your mask is your connection to the device. You get to choose what feels most comfortable and the staff will help to make sure if fits. Here: are some examples of the different masks that are available:       Key points to remember on your journey with sleep apnea:  Sleep study.  PAP devices often need to be adjusted during a sleep study to show that they are effective and  adjusted right.  Good tips to remember: Try wearing just the mask during a quiet time during the day so your body adapts to wearing it. A humidifier is recommended for comfort in most cases to prevent drying of your nose and throat. Allergy medication from your provider may help you if you are having nasal congestion.  Getting settled-in. It takes more than one night for most of us to get used to wearing a mask. Try wearing just the mask during a quiet time during the day so your body adapts to wearing it. A humidifier is recommended for comfort in most cases. Our team will work with you carefully on the first day and will be in contact within 4 days and again at 2 and 4 weeks for advice and remote device adjustments. Your therapy is evaluated by the device each day.   Use it every night. The more you are able to sleep naturally for 7-8 hours, the more likely you will have good sleep and to prevent health risks or symptoms from sleep apnea. Even if you use it 4 hours it helps. Occasionally all of us are unable to use a medical therapy, in sleep apnea, it is not dangerous to miss one night.   Communicate. Call our skilled team on the number provided on the first day if your visit for problems that make it difficult to wear the device. Over 2 out of 3 patients can learn to wear the device long-term with help from our team. Remember to call our team or your sleep providers if you are unable to wear the device as we may have other solutions for those who cannot adapt to mask CPAP therapy. It is recommended that you sleep your sleep provider within the first 3 months and yearly after that if you are not having problems.   Use it for your health. We encourage use of CPAP masks during daytime quiet periods to allow your face and brain to adapt to the sensation of CPAP so that it will be a more natural sensation to awaken to at night or during naps. This can be very useful during the first few weeks or months of adapting to  CPAP though it does not help medically to wear CPAP during wakefulness and  should not be used as a strategy just to meet guidelines.  Take care of your equipment. Make sure you clean your mask and tubing using directions every day and that your filter and mask are replaced as recommended or if they are not working.     BESIDES CPAP, WHAT OTHER THERAPIES ARE THERE?    Positioning Device  Positioning devices are generally used when sleep apnea is mild and only occurs on your back.This example shows a pillow that straps around the waist. It may be appropriate for those whose sleep study shows milder sleep apnea that occurs primarily when lying flat on one's back. Preliminary studies have shown benefit but effectiveness at home may need to be verified by a home sleep test. These devices are generally not covered by medical insurance.  Examples of devices that maintain sleeping on the back to prevent snoring and mild sleep apnea.    Belt type body positioner  http://Ginger.io/    Electronic reminder  http://nightshifttherapy.Instantis/            Oral Appliance  What is oral appliance therapy?  An oral appliance device fits on your teeth at night like a retainer used after having braces. The device is made by a specialized dentist and requires several visits over 1-2 months before a manufactured device is made to fit your teeth and is adjusted to prevent your sleep apnea. Once an oral device is working properly, snoring should be improved. A home sleep test may be recommended at that time if to determine whether the sleep apnea is adequately treated.       Some things to remember:  -Oral devices are often, but not always, covered by your medical insurance. Be sure to check with your insurance provider.   -If you are referred for oral therapy, you will be given a list of specialized dentists to consider or you may choose to visit the Web site of the American Academy of Dental Sleep Medicine  -Oral devices are less likely to  work if you have severe sleep apnea or are extremely overweight.     More detailed information  An oral appliance is a small acrylic device that fits over the upper and lower teeth  (similar to a retainer or a mouth guard). This device slightly moves jaw forward, which moves the base of the tongue forward, opens the airway, improves breathing for effective treat snoring and obstructive sleep apnea in perhaps 7 out of 10 people .  The best working devices are custom-made by a dental device  after a mold is made of the teeth 1, 2, 3.  When is an oral appliance indicated?  Oral appliance therapy is recommended as a first-line treatment for patients with primary snoring, mild sleep apnea, and for patients with moderate sleep apnea who prefer appliance therapy to use of CPAP4, 5. Severity of sleep apnea is determined by sleep testing and is based on the number of respiratory events per hour of sleep.   How successful is oral appliance therapy?  The success rate of oral appliance therapy in patients with mild sleep apnea is 75-80% while in patients with moderate sleep apnea it is 50-70%. The chance of success in patients with severe sleep apnea is 40-50%. The research also shows that oral appliances have a beneficial effect on the cardiovascular health of ALE patients at the same magnitude as CPAP therapy7.  Oral appliances should be a second-line treatment in cases of severe sleep apnea, but if not completely successful then a combination therapy utilizing CPAP plus oral appliance therapy may be effective. Oral appliances tend to be effective in a broad range of patients although studies show that the patients who have the highest success are females, younger patients, those with milder disease, and less severe obesity. 3, 6.   Finding a dentist that practices dental sleep medicine  Specific training is available through the American Academy of Dental Sleep Medicine for dentists interested in working in the  field of sleep. To find a dentist who is educated in the field of sleep and the use of oral appliances, near you, visit the Web site of the American Academy of Dental Sleep Medicine.    References  1. Debi et al. Objectively measured vs self-reported compliance during oral appliance therapy for sleep-disordered breathing. Chest 2013; 144(5): 5865-9386.  2. Jerry, et al. Objective measurement of compliance during oral appliance therapy for sleep-disordered breathing. Thorax 2013; 68(1): 91-96.  3. Arnol et al. Mandibular advancement devices in 620 men and women with ALE and snoring: tolerability and predictors of treatment success. Chest 2004; 125: 6844-1668.  4. Patrice et al. Oral appliances for snoring and ALE: a review. Sleep 2006; 29: 244-262.  5. Kasi et al. Oral appliance treatment for ALE: an update. J Clin Sleep Med 2014; 10(2): 215-227.  6. Clare et al. Predictors of OSAH treatment outcome. J Dent Res 2007; 86: 3210-5509.      Weight Loss:    Weight loss is a long-term strategy that may improve sleep apnea in some patients.    Weight management is a personal decision and the decision should be based on your interest and the potential benefits.  If you are interested in exploring weight loss strategies, the following discussion covers the impact on weight loss on sleep apnea and the approaches that may be successful.    Being overweight does not necessarily mean you will have health consequences.  Those who have BMI over 35 or over 27 with existing medical conditions carries greater risk.   Weight loss decreases severity of sleep apnea in most people with obesity. For those with mild obesity who have developed snoring with weight gain, even 15-30 pound weight loss can improve and occasionally eliminate sleep apnea.  Structured and life-long dietary and health habits are necessary to lose weight and keep healthier weight levels.     Though there may be significant health benefits  from weight loss, long-term weight loss is very difficult to achieve- studies show success with dietary management in less than 10% of people. In addition, substantial weight loss may require years of dietary control and may be difficult if patients have severe obesity. In these cases, surgical management may be considered.  Finally, older individuals who have tolerated obesity without health complications may be less likely to benefit from weight loss strategies.      [unfilled]    Surgery:    Surgery for obstructive sleep apnea is considered generally only when other therapies fail to work. Surgery may be discussed with you if you are having a difficult time tolerating CPAP and or when there is an abnormal structure that requires surgical correction.  Nose and throat surgeries often enlarge the airway to prevent collapse.  Most of these surgeries create pain for 1-2 weeks and up to half of the most common surgeries are not effective throughout life.  You should carefully discuss the benefits and drawbacks to surgery with your sleep provider and surgeon to determine if it is the best solution for you.   More information  Surgery for ALE is directed at areas that are responsible for narrowing or complete obstruction of the airway during sleep.  There are a wide range of procedures available to enlarge and/or stabilize the airway to prevent blockage of breathing in the three major areas where it can occur: the palate, tongue, and nasal regions.  Successful surgical treatment depends on the accurate identification of the factors responsible for obstructive sleep apnea in each person.  A personalized approach is required because there is no single treatment that works well for everyone.  Because of anatomic variation, consultation with an examination by a sleep surgeon is a critical first step in determining what surgical options are best for each patient.  In some cases, examination during sedation may be recommended  in order to guide the selection of procedures.  Patients will be counseled about risks and benefits as well as the typical recovery course after surgery. Surgery is typically not a cure for a person s ALE.  However, surgery will often significantly improve one s ALE severity (termed  success rate ).  Even in the absence of a cure, surgery will decrease the cardiovascular risk associated with OSA7; improve overall quality of life8 (sleepiness, functionality, sleep quality, etc).      Palate Procedures:  Patients with ALE often have narrowing of their airway in the region of their tonsils and uvula.  The goals of palate procedures are to widen the airway in this region as well as to help the tissues resist collapse.  Modern palate procedure techniques focus on tissue conservation and soft tissue rearrangement, rather than tissue removal.  Often the uvula is preserved in this procedure. Residual sleep apnea is common in patient after pharyngoplasty with an average reduction in sleep apnea events of 33%2.      Tongue Procedures:  ExamWhile patients are awake, the muscles that surround the throat are active and keep this region open for breathing. These muscles relax during sleep, allowing the tongue and other structures to collapse and block breathing.  There are several different tongue procedures available.  Selection of a tongue base procedure depends on characteristics seen on physical exam.  Generally, procedures are aimed at removing bulky tissues in this area or preventing the back of the tongue from falling back during sleep.  Success rates for tongue surgery range from 50-62%3.    Hypoglossal Nerve Stimulation:  Hypoglossal nerve stimulation has recently received approval from the [place] Food and Drug Administration for the treatment of obstructive sleep apnea.  This is based on research showing that the system was safe and effective in treating sleep apnea6.  Results showed that the median AHI score  decreased 68%, from 29.3 to 9.0. This therapy uses an implant system that senses breathing patterns and delivers mild stimulation to airway muscles, which keeps the airway open during sleep.  The system consists of three fully implanted components: a small generator (similar in size to a pacemaker), a breathing sensor, and a stimulation lead.  Using a small handheld remote, a patient turns the therapy on before bed and off upon awakening.    Candidates for this device must be greater than 18 years of age, have moderate to severe ALE (AHI between 15-65), BMI less than 35, have tried CPAP/oral appliance for at least 8 weeks without success, and have appropriate upper airway anatomy (determined by a sleep endoscopy performed by Dr. Yosvany Bray).    Hypoglossal Nerve Stimulation Pathway:    The sleep surgeon s office will work with the patient through the insurance prior-authorization process (including communications and appeals).    Nasal Procedures:  Nasal obstruction can interfere with nasal breathing during the day and night.  Studies have shown that relief of nasal obstruction can improve the ability of some patients to tolerate positive airway pressure therapy for obstructive sleep apnea1.  Treatment options include medications such as nasal saline, topical corticosteroid and antihistamine sprays, and oral medications such as antihistamines or decongestants. Non-surgical treatments can include external nasal dilators for selected patients. If these are not successful by themselves, surgery can improve the nasal airway either alone or in combination with these other options.      Combination Procedures:  Combination of surgical procedures and other treatments may be recommended, particularly if patients have more than one area of narrowing or persistent positional disease.  The success rate of combination surgery ranges from 66-80%2,3.    References  Fahad BALBUENA. The Role of the Nose in Snoring and Obstructive  Sleep Apnoea: An Update.  Eur Arch Otorhinolaryngol. 2011; 268: 1365-73.   Philly SM; Louis JA; Elsi JR; Pallanch JF; Shira MB; Melina SG; Marilyn BRODERICK. Surgical modifications of the upper airway for obstructive sleep apnea in adults: a systematic review and meta-analysis. SLEEP 2010;33(10):1469-5641. Edgardo ARRIOLA. Hypopharyngeal surgery in obstructive sleep apnea: an evidence-based medicine review.  Arch Otolaryngol Head Neck Surg. 2006 Feb;132(2):206-13.  Lon YH1, Norma Y, Isaak MARY. The efficacy of anatomically based multilevel surgery for obstructive sleep apnea. Otolaryngol Head Neck Surg. 2003 Oct;129(4):327-35.  Kezirian E, Goldberg A. Hypopharyngeal Surgery in Obstructive Sleep Apnea: An Evidence-Based Medicine Review. Arch Otolaryngol Head Neck Surg. 2006 Feb;132(2):206-13.  Katelynn ACEVEDO et al. Upper-Airway Stimulation for Obstructive Sleep Apnea.  N Engl J Med. 2014 Jan 9;370(2):139-49.  Peker Y et al. Increased Incidence of Cardiovascular Disease in Middle-aged Men with Obstructive Sleep Apnea. Am J Respir Crit Care Med; 2002 166: 159-165  Figueredo EM et al. Studying Life Effects and Effectiveness of Palatopharyngoplasty (SLEEP) study: Subjective Outcomes of Isolated Uvulopalatopharyngoplasty. Otolaryngol Head Neck Surg. 2011; 144: 623-631.        WHAT IF I ONLY HAVE SNORING?    Mandibular advancement devices, lateral sleep positioning, long-term weight loss and treatment of nasal allergies have been shown to improve snoring.  Exercising tongue muscles with a game (https://apps.PriceAdvice.CrowdHall/us/ladi/soundly-reduce-snoring/ay2408071628) or stimulating the tongue during the day with a device (https://doi.org/10.3390/svt00326581) have improved snoring in some individuals.    Remember to Drive Safe... Drive Alive     Sleep health profoundly affects your health, mood, and your safety.  Thirty three percent of the population (one in three of us) is not getting enough sleep and many have a sleep disorder. Not getting  enough sleep or having an untreated / undertreated sleep condition may make us sleepy without even knowing it. In fact, our driving could be dramatically impaired due to our sleep health. As your provider, here are some things I would like you to know about driving:     Here are some warning signs for impairment and dangerous drowsy driving:              -Having been awake more than 16 hours               -Looking tired               -Eyelid drooping              -Head nodding (it could be too late at this point)              -Driving for more than 30 minutes     Some things you could do to make the driving safer if you are experiencing some drowsiness:              -Stop driving and rest              -Call for transportation              -Make sure your sleep disorder is adequately treated     Some things that have been shown NOT to work when experiencing drowsiness while driving:              -Turning on the radio              -Opening windows              -Eating any  distracting  /  entertaining  foods (e.g., sunflower seeds, candy, or any other)              -Talking on the phone      Your decision may not only impact your life, but also the life of others. Please, remember to drive safe for yourself and all of us.

## 2023-07-26 NOTE — TELEPHONE ENCOUNTER
Pre visit planning completed.      Arrival time: 0900. Procedure time 1000    Pre op exam needed? N/A    Facility location: [facility]; [address], [place] [address]    Sedation type: Conscious sedation     Indication for procedure: Colon cancer screening       Chart review:     Electronic implanted devices? No    Diabetic? No    Diabetic medication HOLDING recommendations: (if applicable)  Oral diabetic medications: N/A  Diabetic injectables: N/A  Insulin: N/A      Medication review:    Anticoagulants? No    NSAIDS? No NSAID medications per patient's medication list.  RN will verify with pre-assessment call.    Other medication HOLDING recommendations:  N/A      Prep for procedure:     Bowel prep recommendation: Miralax prep without magnesium citrate   Due to:  standard bowel prep.    Prep instructions sent via TableNOW     Whitley Alvarado RN  Endoscopy Procedure Pre Assessment RN  [phone] option 4

## 2023-07-26 NOTE — TELEPHONE ENCOUNTER
Pre assessment completed for upcoming procedure.   (Please see previous telephone encounter notes for complete details)    Procedure details:    Arrival time and facility location reviewed    Pre op exam needed? N/A    Designated  policy reviewed. Instructed to have someone stay 6 hours post procedure.     COVID policy reviewed.      Medication review:    Medications reviewed. Please see supporting documentation below. Holding recommendations discussed (if applicable).       Prep for procedure:     Reviewed procedure prep instructions.       Additional information needed?  N/A      Patient  verbalized understanding and had no questions or concerns at this time.      Whitley Alvarado RN  Endoscopy Procedure Pre Assessment RN  [phone] option 4

## 2023-08-09 ENCOUNTER — HOSPITAL ENCOUNTER (OUTPATIENT)
Facility: CLINIC | Age: 63
Discharge: HOME | End: 2023-08-09
Attending: INTERNAL MEDICINE | Admitting: INTERNAL MEDICINE
Payer: COMMERCIAL

## 2023-08-09 VITALS
DIASTOLIC BLOOD PRESSURE: 69 MMHG | SYSTOLIC BLOOD PRESSURE: 103 MMHG | HEART RATE: 67 BPM | RESPIRATION RATE: 20 BRPM | OXYGEN SATURATION: 95 %

## 2023-08-09 LAB — COLONOSCOPY: NORMAL

## 2023-08-09 PROCEDURE — 88305 TISSUE EXAM BY PATHOLOGIST: CPT | Mod: 26 | Performed by: PATHOLOGY

## 2023-08-09 PROCEDURE — 88305 TISSUE EXAM BY PATHOLOGIST: CPT | Mod: TC | Performed by: INTERNAL MEDICINE

## 2023-08-09 PROCEDURE — 99153 MOD SED SAME PHYS/QHP EA: CPT | Mod: PT | Performed by: INTERNAL MEDICINE

## 2023-08-09 PROCEDURE — 250N000011 HC RX IP 250 OP 636: Performed by: INTERNAL MEDICINE

## 2023-08-09 PROCEDURE — 45385 COLONOSCOPY W/LESION REMOVAL: CPT | Mod: PT | Performed by: INTERNAL MEDICINE

## 2023-08-09 PROCEDURE — 45380 COLONOSCOPY AND BIOPSY: CPT | Mod: PT,XU | Performed by: INTERNAL MEDICINE

## 2023-08-09 PROCEDURE — G0500 MOD SEDAT ENDO SERVICE >5YRS: HCPCS | Mod: PT | Performed by: INTERNAL MEDICINE

## 2023-08-09 PROCEDURE — 45382 COLONOSCOPY W/CONTROL BLEED: CPT | Performed by: INTERNAL MEDICINE

## 2023-08-09 RX ORDER — PROCHLORPERAZINE MALEATE 5 MG
5 TABLET ORAL EVERY 6 HOURS PRN
Status: DISCONTINUED | OUTPATIENT
Start: 2023-08-09 | End: 2023-08-09 | Stop reason: HOSPADM

## 2023-08-09 RX ORDER — LIDOCAINE 40 MG/G
CREAM TOPICAL
Status: DISCONTINUED | OUTPATIENT
Start: 2023-08-09 | End: 2023-08-09 | Stop reason: HOSPADM

## 2023-08-09 RX ORDER — ONDANSETRON 2 MG/ML
4 INJECTION INTRAMUSCULAR; INTRAVENOUS
Status: DISCONTINUED | OUTPATIENT
Start: 2023-08-09 | End: 2023-08-09 | Stop reason: HOSPADM

## 2023-08-09 RX ORDER — NALOXONE HYDROCHLORIDE 0.4 MG/ML
0.2 INJECTION, SOLUTION INTRAMUSCULAR; INTRAVENOUS; SUBCUTANEOUS
Status: DISCONTINUED | OUTPATIENT
Start: 2023-08-09 | End: 2023-08-09 | Stop reason: HOSPADM

## 2023-08-09 RX ORDER — ONDANSETRON 2 MG/ML
4 INJECTION INTRAMUSCULAR; INTRAVENOUS EVERY 6 HOURS PRN
Status: DISCONTINUED | OUTPATIENT
Start: 2023-08-09 | End: 2023-08-09 | Stop reason: HOSPADM

## 2023-08-09 RX ORDER — FENTANYL CITRATE 50 UG/ML
INJECTION, SOLUTION INTRAMUSCULAR; INTRAVENOUS PRN
Status: DISCONTINUED | OUTPATIENT
Start: 2023-08-09 | End: 2023-08-09 | Stop reason: HOSPADM

## 2023-08-09 RX ORDER — NALOXONE HYDROCHLORIDE 0.4 MG/ML
0.4 INJECTION, SOLUTION INTRAMUSCULAR; INTRAVENOUS; SUBCUTANEOUS
Status: DISCONTINUED | OUTPATIENT
Start: 2023-08-09 | End: 2023-08-09 | Stop reason: HOSPADM

## 2023-08-09 RX ORDER — ONDANSETRON 4 MG/1
4 TABLET, ORALLY DISINTEGRATING ORAL EVERY 6 HOURS PRN
Status: DISCONTINUED | OUTPATIENT
Start: 2023-08-09 | End: 2023-08-09 | Stop reason: HOSPADM

## 2023-08-09 RX ORDER — FLUMAZENIL 0.1 MG/ML
0.2 INJECTION, SOLUTION INTRAVENOUS
Status: DISCONTINUED | OUTPATIENT
Start: 2023-08-09 | End: 2023-08-09 | Stop reason: HOSPADM

## 2023-08-09 ASSESSMENT — ACTIVITIES OF DAILY LIVING (ADL)
ADLS_ACUITY_SCORE: 37
ADLS_ACUITY_SCORE: 37

## 2023-08-09 NOTE — OR NURSING
Colonoscopy under conscious sedation.  Polyps removed with cold snare and biopsy forceps.  One Steris Assurance clip used to control bleed.  Pt tolerated procedure.

## 2023-08-09 NOTE — H&P
Gastroenterology Pre-op History and Physical    Romi Cortez MRN# [id number]   Age: 63 year old YOB: 1960      Date of Surgery: 08/09/23  Location [facility]      Date of Exam 8/9/2023 Facility Same Day       Primary care provider: Mayra Persaud         Chief Complaint and/or Reason for Procedure:   62 yo female for screening colonoscopy.  Prior exam around age 48 normal.  No symptoms, anticoagulation or FH.           Past Medical and Surgical History:     History reviewed. No pertinent past medical history.  See below  Past Surgical History:   Procedure Laterality Date    ARTHROSCOPY SHOULDER ROTATOR CUFF REPAIR Right 3/22/2022    Procedure: Right arthroscopic cuff repair, subacromial decompression, biceps tenotomy;  Surgeon: Belinda Cazares MD;  Location: [facility]    LAPAROSCOPIC APPENDECTOMY  11/21/2013    Procedure: LAPAROSCOPIC APPENDECTOMY;  Laparoscopic Appendectomy;  Surgeon: Sung Rodriguez MD;  Location:  OR            Medications (include herbals and vitamins):        Medications Prior to Admission   Medication Sig Dispense Refill Last Dose    amLODIPine (NORVASC) 5 MG tablet Take 1 tablet by mouth daily at 2 pm   8/9/2023    atomoxetine (STRATTERA) 40 MG capsule Take 1 capsule (40 mg) by mouth daily 30 capsule 1 8/9/2023    bisacodyl (DULCOLAX) 5 MG EC tablet Take 2 tablets at 3 pm the day before your procedure. If your procedure is before 11 am, take 2 additional tablets at 11 pm. If your procedure is after 11 am, take 2 additional tablets at 6 am. For additional instructions refer to your colonoscopy prep instructions. 4 tablet 0 8/8/2023    desvenlafaxine succinate (PRISTIQ) 50 MG 24 hr tablet Take 1 tablet (50 mg) by mouth daily 30 tablet 1 8/9/2023    polyethylene glycol (GOLYTELY) 236 g suspension The night before the exam at 6 pm drink an 8-ounce glass every 15 minutes until the jug is half empty. If you arrive  before 11 AM: Drink the other half of the [employer] jug at 11 PM night before procedure. If you arrive after 11 AM: Drink the other half of the [employer] jug at 6 AM day of procedure. For additional instructions refer to your colonoscopy prep instructions. 4000 mL 0 8/9/2023    bisacodyl (DULCOLAX) 5 MG EC tablet Take 2 tablets at 3 pm the day before your procedure. If your procedure is before 11 am, take 2 additional tablets at 11 pm. If your procedure is after 11 am, take 2 additional tablets at 6 am. For additional instructions refer to your colonoscopy prep instructions. 4 tablet 0     clobetasol (TEMOVATE) 0.05 % external ointment Apply topically 2 times daily Apply twice daily to areas of rash and fissuring on the hands and fingers. Use Vaseline over the entire hand at night and then cover in white gloves. (Patient not taking: Reported on 6/28/2023) 60 g 3     diclofenac (VOLTAREN) 1 % topical gel Apply 4 g topically 4 times daily as needed for moderate pain (Patient not taking: Reported on 6/28/2023) 350 g 0     lamoTRIgine (LAMICTAL) 200 MG tablet Take 1 tablet (200 mg) by mouth daily 30 tablet 1     polyethylene glycol (GOLYTELY) 236 g suspension The night before the exam at 6 pm drink an 8-ounce glass every 15 minutes until the jug is half empty. If you arrive before 11 AM: Drink the other half of the [employer] jug at 11 PM night before procedure. If you arrive after 11 AM: Drink the other half of the [employer] jug at 6 AM day of procedure. For additional instructions refer to your colonoscopy prep instructions. 4000 mL 0     triamcinolone (KENALOG) 0.025 % ointment Apply topically 2 times daily (Patient not taking: Reported on 6/28/2023)       triamcinolone (KENALOG) 0.1 % external ointment Apply topically 2 times daily To your affected areas on your hands and feet (Patient not taking: Reported on 6/26/2023) 80 g 3     zolpidem (AMBIEN) 5 MG tablet Take tablet by mouth 15 minutes prior to sleep, for Sleep  Study 1 tablet 0              Allergies:    No Known Allergies            Physical Exam:   All vitals have been reviewed  Patient Vitals for the past 8 hrs:   BP Pulse Resp SpO2   08/09/23 0900 (!) 138/93 96 16 96 %     No intake/output data recorded.  Airway assessment:   Patient is able to open mouth wide  Patient is able to stick out tongue  Mallampatti classification: Class I (visualization of the soft palate, fauces, uvula, anterior and posterior pillars)}      Lungs:   No increased work of breathing, good air exchange, clear to auscultation bilaterally, no crackles or wheezing     Cardiovascular:   regular rate and rhythm and normal S1 and S2                 Anesthetic risk and/or ASA classification:   ASA 1    Ranjit Jimenez MD          PMH:   Patient Active Problem List   Diagnosis   Bipolar affective disorder, currently depressed, moderate ([facility])   SLEEP APNEA   Unspecified temporomandibular joint disorder, unspecified side   DORSALGIA   AK (actinic keratosis)   Appendicitis   Cervicalgia   Dermatitis, seborrheic   Bipolar II disorder ([facility])

## 2023-08-10 LAB
PATH REPORT.COMMENTS IMP SPEC: NORMAL
PATH REPORT.COMMENTS IMP SPEC: NORMAL
PATH REPORT.FINAL DX SPEC: NORMAL
PATH REPORT.GROSS SPEC: NORMAL
PATH REPORT.MICROSCOPIC SPEC OTHER STN: NORMAL
PATH REPORT.RELEVANT HX SPEC: NORMAL
PHOTO IMAGE: NORMAL

## 2023-08-11 DIAGNOSIS — F31.81 BIPOLAR II DISORDER (H): ICD-10-CM

## 2023-08-11 DIAGNOSIS — F90.0 ADHD (ATTENTION DEFICIT HYPERACTIVITY DISORDER), INATTENTIVE TYPE: ICD-10-CM

## 2023-08-15 RX ORDER — DESVENLAFAXINE 50 MG/1
50 TABLET, FILM COATED, EXTENDED RELEASE ORAL DAILY
Qty: 30 TABLET | Refills: 0 | Status: SHIPPED | OUTPATIENT
Start: 2023-08-15 | End: 2023-08-23

## 2023-08-15 NOTE — TELEPHONE ENCOUNTER
Medication requested:   desvenlafaxine succinate (PRISTIQ) 50 MG 24 hr tablet   lamoTRIgine (LAMICTAL) 200 MG tablet   Last refilled: 6/26/23  Qty: 30/1      Last seen: 6/26/23  RTC: 6 weeks  Cancel: 0  No-show: 0  Next appt: 0    Refill decision:   Refill pended and routed to the provider for review/determination due to   lamoTRIgine-last note states.. she chooses to continue lamotrigine 200mg BID   But it is ordered as Take 1 tablet (200 mg) by mouth daily     .    desvenlafaxine succinate -30 day alexis refill sent to the pharmacy - including instructions for patient to call the clinic and schedule an appointment.

## 2023-08-16 RX ORDER — LAMOTRIGINE 200 MG/1
200 TABLET ORAL DAILY
Qty: 30 TABLET | Refills: 0 | Status: SHIPPED | OUTPATIENT
Start: 2023-08-16 | End: 2023-08-23

## 2023-08-23 ENCOUNTER — MYC MEDICAL ADVICE (OUTPATIENT)
Dept: PSYCHIATRY | Facility: CLINIC | Age: 63
End: 2023-08-23
Payer: COMMERCIAL

## 2023-08-23 ENCOUNTER — VIRTUAL VISIT (OUTPATIENT)
Dept: PSYCHIATRY | Facility: CLINIC | Age: 63
End: 2023-08-23
Attending: NURSE PRACTITIONER
Payer: COMMERCIAL

## 2023-08-23 DIAGNOSIS — F90.0 ADHD (ATTENTION DEFICIT HYPERACTIVITY DISORDER), INATTENTIVE TYPE: ICD-10-CM

## 2023-08-23 DIAGNOSIS — F31.81 BIPOLAR II DISORDER (H): ICD-10-CM

## 2023-08-23 PROCEDURE — 99214 OFFICE O/P EST MOD 30 MIN: CPT | Mod: VID | Performed by: NURSE PRACTITIONER

## 2023-08-23 RX ORDER — LAMOTRIGINE 200 MG/1
200 TABLET ORAL DAILY
Qty: 30 TABLET | Refills: 1 | Status: SHIPPED | OUTPATIENT
Start: 2023-08-23 | End: 2023-08-24

## 2023-08-23 RX ORDER — DESVENLAFAXINE 50 MG/1
50 TABLET, FILM COATED, EXTENDED RELEASE ORAL DAILY
Qty: 30 TABLET | Refills: 1 | Status: SHIPPED | OUTPATIENT
Start: 2023-08-23 | End: 2023-08-24

## 2023-08-23 RX ORDER — ATOMOXETINE 40 MG/1
40 CAPSULE ORAL DAILY
Qty: 30 CAPSULE | Refills: 1 | Status: SHIPPED | OUTPATIENT
Start: 2023-08-23 | End: 2023-08-24

## 2023-08-23 ASSESSMENT — PAIN SCALES - GENERAL: PAINLEVEL: NO PAIN (0)
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
## 2023-08-23 NOTE — PATIENT INSTRUCTIONS
**For crisis resources, please see the information at the end of this document**   Patient Education    Thank you for coming to the [facility] [place] [facility].     Lab Testing:  If you had lab testing today and your results are reassuring or normal they will be mailed to you or sent through [employer] within 7 days. If the lab tests need quick action we will call you with the results. The phone number we will call with results is # [phone]. If this is not the best number please call our clinic and change the number.     Medication Refills:  If you need any refills please call your pharmacy and they will contact us. Our fax number for refills is [phone].   Three business days of notice are needed for general medication refill requests.   Five business days of notice are needed for controlled substance refill requests.   If you need to change to a different pharmacy, please contact the new pharmacy directly. The new pharmacy will help you get your medications transferred.     Contact Us:  Please call [phone] during business hours (8-5:00 M-F).   If you have medication related questions after clinic hours, or on the weekend, please call [phone].     Financial Assistance [phone]   Medical Records [phone]       MENTAL HEALTH CRISIS RESOURCES:  For a emergency help, please call 911 or go to the nearest Emergency Department.     Emergency Walk-In Options:   EmPATH Unit @ [facility] ([place]): [phone] - Specialized mental health emergency area designed to be calming  [facility] ([place]): [phone]  [facility] ([place]): [phone]  [facility]): [phone]    [facility] Crisis Information:   [place]: [phone]  [place]: [phone]  [place] ([place]) - Adult: [phone]     Child: [phone]  [place] - Adult: [phone]     Child: [phone]  [place]:  [phone]  List of all [facility] resources:   https://[place].gov/dhs/people-we-serve/adults/health-care/mental-health/resources/crisis-contacts.jsp    National Crisis Information:   Crisis Text Line: Text  [place]  to 637080  Suicide & Crisis Lifeline: 988  National Suicide Prevention Lifeline: 6-733-395-TALK (1-617.488.9098)       For online chat options, visit https://suicidepreventionlifeline.org/chat/  Poison Control Center: 3-904-597-6258  Trans Lifeline: 2-288-176-8719 - Hotline for transgender people of all ages  The [employer] Project: 0-329-363-3965 - Hotline for LGBT youth     For Non-Emergency Support:   Fast Tracker: Mental Health & Substance Use Disorder Resources -   https://www.[employer]n.org/

## 2023-08-23 NOTE — NURSING NOTE
Is the patient currently in the state of [place]? YES    Visit mode:VIDEO    If the visit is dropped, the patient can be reconnected by: VIDEO VISIT: Text to cell phone:   Telephone Information:   Mobile [phone]       Will anyone else be joining the visit? NO  (If patient encounters technical issues they should call [phone]    How would you like to obtain your AVS? MyChart    Are changes needed to the allergy or medication list? Yes Please see medications that have been flagged for removal.    Reason for visit: RECHECK    Patient will complete questionnaires prior to joining video.    Lisa TALBOT

## 2023-08-24 RX ORDER — ATOMOXETINE 40 MG/1
40 CAPSULE ORAL DAILY
Qty: 30 CAPSULE | Refills: 1 | Status: SHIPPED | OUTPATIENT
Start: 2023-08-24 | End: 2023-10-02

## 2023-08-24 RX ORDER — DESVENLAFAXINE 50 MG/1
50 TABLET, FILM COATED, EXTENDED RELEASE ORAL DAILY
Qty: 30 TABLET | Refills: 1 | Status: SHIPPED | OUTPATIENT
Start: 2023-08-24 | End: 2023-10-02

## 2023-08-24 RX ORDER — LAMOTRIGINE 200 MG/1
200 TABLET ORAL DAILY
Qty: 30 TABLET | Refills: 1 | Status: SHIPPED | OUTPATIENT
Start: 2023-08-24 | End: 2023-10-02

## 2023-08-24 NOTE — CONFIDENTIAL NOTE
[facility] location on [address] [facility] is closed.    Patient would like prescriptions sent to:    [facility]    [facility], the medications are in stock and they are currently filling them.

## 2023-08-25 ENCOUNTER — CARE COORDINATION (OUTPATIENT)
Dept: PSYCHIATRY | Facility: CLINIC | Age: 63
End: 2023-08-25
Payer: COMMERCIAL

## 2023-08-25 DIAGNOSIS — F31.81 BIPOLAR II DISORDER (H): Primary | ICD-10-CM

## 2023-09-01 ENCOUNTER — OFFICE VISIT (OUTPATIENT)
Dept: DERMATOLOGY | Facility: CLINIC | Age: 63
End: 2023-09-01
Payer: COMMERCIAL

## 2023-09-01 DIAGNOSIS — L57.0 AK (ACTINIC KERATOSIS): ICD-10-CM

## 2023-09-01 DIAGNOSIS — D22.9 MULTIPLE BENIGN NEVI: ICD-10-CM

## 2023-09-01 DIAGNOSIS — L30.9 DERMATITIS: Primary | ICD-10-CM

## 2023-09-01 PROCEDURE — 17000 DESTRUCT PREMALG LESION: CPT | Performed by: DERMATOLOGY

## 2023-09-01 PROCEDURE — 99213 OFFICE O/P EST LOW 20 MIN: CPT | Mod: 25 | Performed by: DERMATOLOGY

## 2023-09-01 ASSESSMENT — PAIN SCALES - GENERAL: PAINLEVEL: NO PAIN (0)

## 2023-09-01 NOTE — NURSING NOTE
Dermatology Rooming Note    Romi Cortez's goals for this visit include:   Chief Complaint   Patient presents with    Skin Check     FBSE.  No new concerns.      Mayra Piper, CMA

## 2023-09-01 NOTE — LETTER
9/1/2023       RE: Romi Cortez  [address] [facility] [address]     Dear Colleague,    Thank you for referring your patient, Romi Cortez, to the [facility] DERMATOLOGY CLINIC [place] at [facility]. Please see a copy of my visit note below.    [facility] Dermatology Note  Encounter Date: Sep 1, 2023  Office Visit     Dermatology Problem List:  FBSE 9/1/23   # AKs, LN   # Dermatitis: eczematous vs psoriasiform, hands  - Clobetasol   # Seb derm  - no longer using keto shampoo, she is now using an otc gentle shampoo.  ____________________________________________    Assessment & Plan:     # Dermatitis, hands: AD vs ICD/ACD vs psoriasiform given lesions behind the ears. Plays violin as hobby could be ACD/ICD to something such as resin. Continuing Clobetasol BID with Vaseline at night under cotton glove occlusion. Will consider allergy testing in the future.     #Actinic Keratosis: R cheek  - Cryotherapy procedure note, location(s): R cheek. After verbal consent and discussion of risks and benefits including, but not limited to, dyspigmentation/scar, blister, and pain, R cheek lesion was treated with 1-2 mm freeze border for 1-2 cycles with liquid nitrogen. Post cryotherapy instructions were provided.     # Benign nevi  # Cherry angiomas  # Seborrheic keratoses   # Solar lentigines       Procedures Performed:   None    Follow-up: 6 month(s) in-person, or earlier for new or changing lesions    Staff and Student:  Masha Nathan, MS3  [employer]    I was present with the medical student who participated in the service and in the documentation of the note.  I have verified the history and personally performed the physical exam and medical decision making.  I agree with the assessment and plan of care as documented in the note. I performed the procedure(s).     Nayana Bazan MD   of  Dermatology  [facility]      ____________________________________________    CC: Skin Check (FBSE.  No new concerns. )    HPI:  Ms. Romi Cortez is a 63 year old female who presents as a follow-up for FBSE. Seen last in Feb for FBSE and evaluation of dermatitis of hands and ears. Has hx of painful migratory lesions on knuckles that come and go, or move to different knuckles. They are often accompanies by fissuring and pinpoint bleeding in some areas. They arise more frequently in the winter and last several days. Has tried triamcinolone in the past. Uses Cetaphil and CeraVe lotion and aquaphor. She plays the violin but does not think she comes into contact with resin. She does not think that the plucking of the strings on her violin has anything to do with the cracking of the skin or bleeding. Denies history of eczema, allergies, asthma.     - The last time the painful nodules were bothersome was this Spring. Currently, her R middle finger has some fissuring and dry scaly skin.  - She has dry scaly patches behind both her ears but denies itching, bleeding, discharge. She occassionally wears earings but nothing behind the ears.   - There are some new moles on her chest and lower neck that have come up within the last year.    She hasn't been using meds consistently but she hasn't been needing them very often.    Denies any other new, changing, bleeding, or nonhealing lesions.     Labs Reviewed:  N/A    Physical Exam:  Vitals: There were no vitals taken for this visit.  SKIN: Total skin excluding the undergarment areas was performed. The exam included the head/face, neck, both arms, chest, back, abdomen, both legs, digits and/or nails.   - there is erythema and scale behind the ears, fissuring of lower earlobes, bilaterally  - erythematous irregularly shaped macule with scale on R cheek  - Single, well-circumscribed cyst on lower neck  - Fissuring and scale of R middle fingertip  - Multiple,  well-circumscribed skin-colored papules on chest  - Multiple brown macules on the trunk and extremities, no concerning features   - Waxy brown stuck on papules on trunk and extremities   - Red dome shaped papules on trunk and extremities   - Light brown macules accenuated on sun exposed areas  - Large tan patch on R posterior superior leg    Medications:  Current Outpatient Medications   Medication    amLODIPine (NORVASC) 5 MG tablet    atomoxetine (STRATTERA) 40 MG capsule    bisacodyl (DULCOLAX) 5 MG EC tablet    clobetasol (TEMOVATE) 0.05 % external ointment    desvenlafaxine (PRISTIQ) 50 MG 24 hr tablet    lamoTRIgine (LAMICTAL) 200 MG tablet    polyethylene glycol (GOLYTELY) 236 g suspension    triamcinolone (KENALOG) 0.025 % ointment    triamcinolone (KENALOG) 0.1 % external ointment    zolpidem (AMBIEN) 5 MG tablet    diclofenac (VOLTAREN) 1 % topical gel     No current facility-administered medications for this visit.      Past Medical History:   Patient Active Problem List   Diagnosis    Appendicitis    Dermatitis, seborrheic    AK (actinic keratosis)    Senile sebaceous gland hyperplasia    Lentigines    Injury of forearm muscle or tendon, unspecified laterality, subsequent encounter    Traumatic complete tear of right rotator cuff, subsequent encounter    Bipolar affective disorder, currently depressed, moderate (H)    Bipolar II disorder, most recent episode major depressive (H)    Depressive disorder    Sleep apnea    Essential (primary) hypertension    Unspecified temporomandibular joint disorder, unspecified side     No past medical history on file.    CC Referred Self  No address on file on close of this encounter.

## 2023-09-17 ENCOUNTER — THERAPY VISIT (OUTPATIENT)
Dept: SLEEP MEDICINE | Facility: CLINIC | Age: 63
End: 2023-09-17
Payer: COMMERCIAL

## 2023-09-17 DIAGNOSIS — G47.33 OBSTRUCTIVE SLEEP APNEA SYNDROME: ICD-10-CM

## 2023-09-17 PROCEDURE — 95811 POLYSOM 6/>YRS CPAP 4/> PARM: CPT | Performed by: INTERNAL MEDICINE

## 2023-09-18 NOTE — NURSING NOTE
Completed a split night PSG per provider order.    Preliminary AHI 30.3.  A final therapeutic PAP pressure was achieved.    Supine REM was seen on therapeutic pressure.    Patient reports feeling refreshed in AM.

## 2023-09-18 NOTE — PATIENT INSTRUCTIONS
[place] [facility]    1. Your sleep study will be reviewed by a sleep physician within the next few days.     2. Please follow up in the sleep clinic as scheduled, or, make an appointment with your sleep provider to be seen within two weeks to discuss the results of the sleep study.    3. If you have any questions or problems with your treatment plan, please contact your sleep clinic provider at [phone] to further manage your condition.    4. Please review your attached medication list, and, at your follow-up appointment advise your sleep clinic provider about any changes.    5. Go to [email or web address] for more information about your sleep problems.

## 2023-09-19 LAB — SLPCOMP: NORMAL

## 2023-10-02 ENCOUNTER — VIRTUAL VISIT (OUTPATIENT)
Dept: PSYCHIATRY | Facility: CLINIC | Age: 63
End: 2023-10-02
Attending: NURSE PRACTITIONER
Payer: COMMERCIAL

## 2023-10-02 DIAGNOSIS — F90.0 ADHD (ATTENTION DEFICIT HYPERACTIVITY DISORDER), INATTENTIVE TYPE: ICD-10-CM

## 2023-10-02 DIAGNOSIS — F31.81 BIPOLAR II DISORDER (H): ICD-10-CM

## 2023-10-02 PROCEDURE — 99214 OFFICE O/P EST MOD 30 MIN: CPT | Mod: VID | Performed by: NURSE PRACTITIONER

## 2023-10-02 RX ORDER — LAMOTRIGINE 200 MG/1
200 TABLET ORAL DAILY
Qty: 30 TABLET | Refills: 1 | Status: SHIPPED | OUTPATIENT
Start: 2023-10-19 | End: 2024-01-22

## 2023-10-02 RX ORDER — DESVENLAFAXINE 50 MG/1
50 TABLET, FILM COATED, EXTENDED RELEASE ORAL DAILY
Qty: 30 TABLET | Refills: 1 | Status: SHIPPED | OUTPATIENT
Start: 2023-10-19 | End: 2023-12-19

## 2023-10-02 RX ORDER — ATOMOXETINE 40 MG/1
40 CAPSULE ORAL DAILY
Qty: 30 CAPSULE | Refills: 1 | Status: SHIPPED | OUTPATIENT
Start: 2023-10-19 | End: 2023-12-19

## 2023-10-02 NOTE — NURSING NOTE
Is the patient currently in the state of [place]? YES    Visit mode:VIDEO    If the visit is dropped, the patient can be reconnected by: VIDEO VISIT: Send to e-mail at: [email or web address]    Will anyone else be joining the visit? NO  (If patient encounters technical issues they should call [phone]    How would you like to obtain your AVS? MyChart    Are changes needed to the allergy or medication list? Pt stated no changes to allergies and Pt stated no med changes    Reason for visit: AVILA TALBOT
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
## 2023-10-27 ENCOUNTER — OFFICE VISIT (OUTPATIENT)
Dept: SLEEP MEDICINE | Facility: CLINIC | Age: 63
End: 2023-10-27
Payer: COMMERCIAL

## 2023-10-27 VITALS
BODY MASS INDEX: 34.07 KG/M2 | SYSTOLIC BLOOD PRESSURE: 137 MMHG | DIASTOLIC BLOOD PRESSURE: 88 MMHG | HEART RATE: 71 BPM | OXYGEN SATURATION: 98 % | WEIGHT: 212 LBS | HEIGHT: 66 IN

## 2023-10-27 DIAGNOSIS — G47.33 OSA (OBSTRUCTIVE SLEEP APNEA): Primary | ICD-10-CM

## 2023-10-27 PROCEDURE — 99213 OFFICE O/P EST LOW 20 MIN: CPT

## 2023-10-27 NOTE — PATIENT INSTRUCTIONS
CPAP machines are often rent-to-own over 3-12 months depending on your insurance. During the first 3 months, insurances will often want to see at least 4 hours of use on 70% of nights over a 30 day period. Ideally, you would wear it whenever sleeping, but 4 hours is where health benefits really start.     If you don't like the first mask you get, you can exchange it once in the first month for free. Otherwise, insurance will cover new supplies about every 3 months. They will give you paperwork explaining how often to clean and replace parts when you get the machine.    We have people called our sleep therapy management team who will be checking in on you a few times in the first month. They can access data from your machine and help troubleshoot any problems you may have.    I will see you back about 2-3 months after getting started on the CPAP. That appointment will be made once you get the CPAP.

## 2023-10-27 NOTE — PROGRESS NOTES
Sleep Study Follow-Up Visit:    Date on this visit: 10/27/2023    Romi Cortez comes in today for follow-up of her sleep study done on 09/17/2023 at the [facility] for a history of moderate to severe obstructive sleep apnea. Study was done to evaluate for current severity of sleep apnea and response to CPAP.             These findings were reviewed with patient.     Past medical/surgical history, family history, social history, medications and allergies were reviewed.      Problem List:  Patient Active Problem List    Diagnosis Date Noted    Essential (primary) hypertension 11/18/2022     Priority: Medium    Traumatic complete tear of right rotator cuff, subsequent encounter 01/14/2022     Priority: Medium     Added automatically from request for surgery [id number]      Bipolar II disorder, most recent episode major depressive (H) 12/19/2021     Priority: Medium    Unspecified temporomandibular joint disorder, unspecified side 01/28/2019     Priority: Medium     Formatting of this note might be different from the original.  Overview Note: TEMPOROMANDIBULAR JOINT DISORDER, UNSPECIFIED #[id number]#    EXT_ID: [id number]      Sleep apnea 04/13/2018     Priority: Medium     Formatting of this note might be different from the original.  Overview Note: SLEEP APNEA #[id number]#    EXT_ID: [id number]      Injury of forearm muscle or tendon, unspecified laterality, subsequent encounter 08/01/2016     Priority: Medium    Bipolar affective disorder, currently depressed, moderate (H) 01/13/2016     Priority: Medium     Formatting of this note might be different from the original.  Overview Note: Other bipolar disorders #[id number]#    EXT_ID: [id number]      Dermatitis, seborrheic 09/15/2015     Priority: Medium    AK (actinic keratosis) 09/15/2015     Priority: Medium    Senile sebaceous gland hyperplasia 09/15/2015     Priority: Medium    Lentigines 09/15/2015     Priority: Medium    Appendicitis 11/21/2013      Priority: Medium    Depressive disorder 10/07/2005     Priority: Medium        Impression/Plan:  (G47.33) ALE (obstructive sleep apnea) (primary encounter diagnosis)  Comment: Romi presents to the sleep clinic to review the results of her in-lab PSG done on 09/17/2023. She has received a copy of her results in the mail. The results of her sleep study were reviewed in depth. Informed her the diagnostic portion showed severe complex and predominantly obstructive sleep apnea with hypoxemia. We reviewed the treatment portion of her study and she was informed she had treatment emergent central sleep apnea on CPAP. Gradual reduction in central events with resolution of hypoxemia was seen on CPAP 7-8 cm H2O. Romi is wanting to give CPAP a try.    Plan: Comprehensive DME  An order was placed for Auto-PAP 7-10 cm H2O. We reviewed compliance goals, mask exchange policy, and patient was enrolled in [facility].     She will follow up with me in about 3 month(s) to document compliance.     Total time spent reviewing medical records, history and physical examination, review of previous testing and interpretation as well as documentation on this date: 25 minutes    JACK Snowden CNP 10/27/2023    CC: Mayra Persaud

## 2023-10-29 ENCOUNTER — TELEPHONE (OUTPATIENT)
Dept: SLEEP MEDICINE | Facility: CLINIC | Age: 63
End: 2023-10-29
Payer: COMMERCIAL

## 2023-10-29 NOTE — TELEPHONE ENCOUNTER
General Call    Contacts         Type Contact Phone/Fax    10/29/2023 02:37 PM CDT Phone (Incoming) Romi Cortez (Self) [phone] (M)          Reason for Call:  Covid diagnosis    What are your questions or concerns:  Romi was seen by Shanda Arellano and a tech or nurse, and now has covid and wanted to let the clinic know in case she exposed anyone.      Date of last appointment with provider: 10/27    Could we send this information to you in 1Cast or would you prefer to receive a phone call?:   Patient would prefer a phone call Patient would prefer a text, if anything.  Okay to leave a detailed message?: No at Cell number on file:    Telephone Information:   Mobile [phone]

## 2023-10-31 NOTE — TELEPHONE ENCOUNTER
Leadership notified of exposure to staff.    Connie AGUILERA RN  [facility]

## 2023-11-09 ENCOUNTER — DOCUMENTATION ONLY (OUTPATIENT)
Dept: SLEEP MEDICINE | Facility: CLINIC | Age: 63
End: 2023-11-09
Payer: COMMERCIAL

## 2023-11-09 DIAGNOSIS — G47.33 OSA (OBSTRUCTIVE SLEEP APNEA): Primary | ICD-10-CM

## 2023-11-09 NOTE — PROGRESS NOTES
Patient was offered choice of vendor and chose [facility].  Patient Romi Cortez was set up at [facility] on November 9, 2023. Patient received a Resmed Airsense 10 Pressures were set at  7-10 cm H2O.   Patient s ramp is 5 cm H2O for Auto and FLEX/EPR is 2.  Patient received a Resmed Mask name: AIRFIT P10  Pillow mask size Standard, heated tubing and heated humidifier.  Patient has the following compliance requirements: using and visit requirements  Patient has a follow up on 1/29/2023 with KEN Mesa     Cantharidin Pregnancy And Lactation Text: The use of this medication during pregnancy or lactation is not recommended as there is insufficient data.

## 2023-11-13 ENCOUNTER — DOCUMENTATION ONLY (OUTPATIENT)
Dept: SLEEP MEDICINE | Facility: CLINIC | Age: 63
End: 2023-11-13
Payer: COMMERCIAL

## 2023-11-13 NOTE — PROGRESS NOTES
3 day Sleep therapy management telephone visit    Diagnostic AHI: 35.4  PSG    Confirmed with patient at time of call- N/A Patient is still interested in STM service       Message left for patient to return call.        Objective data     Order Settings for PAP  CPAP min     CPAP max              Device settings from machine CPAP min 7.0     CPAP max 10.0           EPR Setting TWO    RESMED soft response  OFF     Assessment: Nighty usage most nights over four hours      Action plan: Patient to have 14 day STM visit. Patient has a follow up visit scheduled:   yes within 31-90 days of set up    Replacement device: No  STM ordered by provider: Yes     Total time spent on accessing and  interpreting remote patient PAP therapy data  10 minutes    Total time spent counseling, coaching  and reviewing PAP therapy data with patient  1 minutes    [id number] no

## 2023-11-27 ENCOUNTER — IMMUNIZATION (OUTPATIENT)
Dept: FAMILY MEDICINE | Facility: CLINIC | Age: 63
End: 2023-11-27
Payer: COMMERCIAL

## 2023-11-27 ENCOUNTER — VIRTUAL VISIT (OUTPATIENT)
Dept: PSYCHIATRY | Facility: CLINIC | Age: 63
End: 2023-11-27
Attending: NURSE PRACTITIONER
Payer: COMMERCIAL

## 2023-11-27 DIAGNOSIS — Z23 ENCOUNTER FOR IMMUNIZATION: Primary | ICD-10-CM

## 2023-11-27 DIAGNOSIS — F31.81 BIPOLAR II DISORDER (H): ICD-10-CM

## 2023-11-27 DIAGNOSIS — F90.0 ADHD (ATTENTION DEFICIT HYPERACTIVITY DISORDER), INATTENTIVE TYPE: ICD-10-CM

## 2023-11-27 PROCEDURE — 99213 OFFICE O/P EST LOW 20 MIN: CPT | Mod: VID | Performed by: NURSE PRACTITIONER

## 2023-11-27 PROCEDURE — 90662 IIV NO PRSV INCREASED AG IM: CPT

## 2023-11-27 PROCEDURE — G0008 ADMIN INFLUENZA VIRUS VAC: HCPCS

## 2023-11-27 PROCEDURE — 99207 PR NO CHARGE NURSE ONLY: CPT

## 2023-11-27 ASSESSMENT — ANXIETY QUESTIONNAIRES
5. BEING SO RESTLESS THAT IT IS HARD TO SIT STILL: SEVERAL DAYS
4. TROUBLE RELAXING: MORE THAN HALF THE DAYS
6. BECOMING EASILY ANNOYED OR IRRITABLE: SEVERAL DAYS
GAD7 TOTAL SCORE: 14
GAD7 TOTAL SCORE: 14
1. FEELING NERVOUS, ANXIOUS, OR ON EDGE: NEARLY EVERY DAY
IF YOU CHECKED OFF ANY PROBLEMS ON THIS QUESTIONNAIRE, HOW DIFFICULT HAVE THESE PROBLEMS MADE IT FOR YOU TO DO YOUR WORK, TAKE CARE OF THINGS AT HOME, OR GET ALONG WITH OTHER PEOPLE: VERY DIFFICULT
7. FEELING AFRAID AS IF SOMETHING AWFUL MIGHT HAPPEN: SEVERAL DAYS
3. WORRYING TOO MUCH ABOUT DIFFERENT THINGS: NEARLY EVERY DAY
2. NOT BEING ABLE TO STOP OR CONTROL WORRYING: NEARLY EVERY DAY

## 2023-11-27 ASSESSMENT — PATIENT HEALTH QUESTIONNAIRE - PHQ9
10. IF YOU CHECKED OFF ANY PROBLEMS, HOW DIFFICULT HAVE THESE PROBLEMS MADE IT FOR YOU TO DO YOUR WORK, TAKE CARE OF THINGS AT HOME, OR GET ALONG WITH OTHER PEOPLE: SOMEWHAT DIFFICULT
SUM OF ALL RESPONSES TO PHQ QUESTIONS 1-9: 4
SUM OF ALL RESPONSES TO PHQ QUESTIONS 1-9: 4

## 2023-11-27 NOTE — PROGRESS NOTES
Prior to immunization administration, verified patients identity using patient s name and date of birth. Please see Immunization Activity for additional information.     Screening Questionnaire for Adult Immunization    Are you sick today?   No   Do you have allergies to medications, food, a vaccine component or latex?   No   Have you ever had a serious reaction after receiving a vaccination?   No   Do you have a long-term health problem with heart, lung, kidney, or metabolic disease (e.g., diabetes), asthma, a blood disorder, no spleen, complement component deficiency, a cochlear implant, or a spinal fluid leak?  Are you on long-term aspirin therapy?   No   Do you have cancer, leukemia, HIV/AIDS, or any other immune system problem?   No   Do you have a parent, brother, or sister with an immune system problem?   No   In the past 3 months, have you taken medications that affect  your immune system, such as prednisone, other steroids, or anticancer drugs; drugs for the treatment of rheumatoid arthritis, Crohn s disease, or psoriasis; or have you had radiation treatments?   No   Have you had a seizure, or a brain or other nervous system problem?   No   During the past year, have you received a transfusion of blood or blood    products, or been given immune (gamma) globulin or antiviral drug?   No   For women: Are you pregnant or is there a chance you could become       pregnant during the next month?   No   Have you received any vaccinations in the past 4 weeks?   No     Immunization questionnaire answers were all negative.    I have reviewed the following standing orders:   This patient is due and qualifies for the Influenza vaccine.    Click here for Influenza Vaccine Standing Order    I have reviewed the vaccines inclusion and exclusion criteria; No concerns regarding eligibility.     Patient instructed to remain in clinic for 15 minutes afterwards, and to report any adverse reactions.     Screening performed by  Selena Oliveros MA on 11/27/2023 at 10:43 AM.
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
## 2023-11-27 NOTE — PATIENT INSTRUCTIONS
**For crisis resources, please see the information at the end of this document**   Patient Education    Thank you for coming to the [facility].     Lab Testing:  If you had lab testing today and your results are reassuring or normal they will be mailed to you or sent through [email or web address] within 7 days. If the lab tests need quick action we will call you with the results. The phone number we will call with results is # [phone]. If this is not the best number please call our clinic and change the number.     Medication Refills:  If you need any refills please call your pharmacy and they will contact us. Our fax number for refills is [phone].   Three business days of notice are needed for general medication refill requests.   Five business days of notice are needed for controlled substance refill requests.   If you need to change to a different pharmacy, please contact the new pharmacy directly. The new pharmacy will help you get your medications transferred.     Contact Us:  Please call [phone] during business hours (8-5:00 M-F).   If you have medication related questions after clinic hours, or on the weekend, please call [phone].     Financial Assistance [phone]   Medical Records [phone]       MENTAL HEALTH CRISIS RESOURCES:  For a emergency help, please call 911 or go to the nearest Emergency Department.     Emergency Walk-In Options:   EmPATH Unit @ [facility] ([place]): [phone] - Specialized mental health emergency area designed to be calming  [facility] ([place]): [phone]  [facility] ([place]): [phone]  [facility]): [phone]    [facility] Crisis Information:   [place]: [phone]  [place]: [phone]  [place] - Adult: [phone]     Child: [phone]  [place] - Adult: [phone]     Child: [phone]  [place]:  [phone]  List of all [facility] resources:   https://[place].gov/dhs/people-we-serve/adults/health-care/mental-health/resources/crisis-contacts.jsp    National Crisis Information:   Crisis Text Line: Text  [place]  to 451896  Suicide & Crisis Lifeline: 988  National Suicide Prevention Lifeline: 1-888-542-TALK (1-182.101.9991)       For online chat options, visit https://suicidepreventionlifeline.org/chat/  Poison Control Center: 0-858-643-7497  Trans Lifeline: 8-002-125-2107 - Hotline for transgender people of all ages  The Yov[employer] Project: 1-518-012-8747 - Hotline for LGBT youth     For Non-Emergency Support:   Fast Tracker: Mental Health & Substance Use Disorder Resources -   https://www.[employer].org/

## 2023-11-27 NOTE — NURSING NOTE
Is the patient currently in the state of [place]? YES    Visit mode:VIDEO    If the visit is dropped, the patient can be reconnected by: VIDEO VISIT: Send to e-mail at: [email or web address]    Will anyone else be joining the visit? NO  (If patient encounters technical issues they should call [phone])    How would you like to obtain your AVS? MyChart    Are changes needed to the allergy or medication list? No  Patient denies any changes since echeck-in regarding medication and allergies and states all information entered during echeck-in remains accurate.     Reason for visit: AVILA TALBOT

## 2023-11-28 ENCOUNTER — PATIENT OUTREACH (OUTPATIENT)
Dept: CARE COORDINATION | Facility: CLINIC | Age: 63
End: 2023-11-28
Payer: COMMERCIAL

## 2023-11-28 NOTE — PROGRESS NOTES
Clinic Care Coordination Contact  Clinic Care Coordination Contact  OUTREACH    Referral Information: Kimberly Ulrich, APRN, CNP     Chief Complaint   Patient presents with    Clinic Care Coordination - Initial     Universal Utilization:    Utilization      No Show Count (past year)  0             ED Visits  0             Hospital Admissions  1                    Current as of: 11/27/2023  7:58 PM              Clinical Concerns:  Current Medical Concerns: Did not discuss.    Current Behavioral Concerns: [facility] contacted patient to follow up on referral sent by Kimberly Ulrich. Patient states that she is looking for help getting her brother set up with case management services. She does not feel that her brother is quite ready for a POA, but that he may be open to other services. Patient's brother resides in [facility].      Education Provided to patient: Patient was provided with the instructions for getting case management services set up through [facility].      Medication Management:  Medication review status: Did not discuss.    Lifestyle & Psychosocial Needs:    Social Determinants of Health     Food Insecurity: Not on file   Depression: Not at risk (11/27/2023)    PHQ-2     PHQ-2 Score: 1   Housing Stability: Not on file   Tobacco Use: Low Risk  (11/27/2023)    Patient History     Smoking Tobacco Use: Never     Smokeless Tobacco Use: Never     Passive Exposure: Not on file   Financial Resource Strain: Not on file   Alcohol Use: Not on file   Transportation Needs: Not on file   Physical Activity: Not on file   Interpersonal Safety: Not on file   Stress: Not on file   Social Connections: Not on file     The patient consented via Verbal consent to have contact information and resources sent via email in an unencrypted manner.    Patient/Caregiver understanding: Patient verbalized understanding, engaged in AIDET communication during patient encounter.     Future Appointments                In 4 days Jackson Ross,  EMANUEL BOYLE [facility]    In 1 month Kimberly Ulrich APRN CNP [facility]    In 2 months Shanda Arellano APRN CNP [facility]            Plan: CC will send resources to patient via email, per her request. Patient's email is [email or web address]".    Care Coordinator will do no further outreaches at this time.    [facility]  Clinic Care Coordination  [facility]  [email or web address]  [phone]

## 2023-11-30 ENCOUNTER — TELEPHONE (OUTPATIENT)
Dept: OPHTHALMOLOGY | Facility: CLINIC | Age: 63
End: 2023-11-30
Payer: COMMERCIAL

## 2023-12-02 ENCOUNTER — OFFICE VISIT (OUTPATIENT)
Dept: OPHTHALMOLOGY | Facility: CLINIC | Age: 63
End: 2023-12-02
Payer: COMMERCIAL

## 2023-12-02 DIAGNOSIS — H04.123 DRY EYES, BILATERAL: ICD-10-CM

## 2023-12-02 DIAGNOSIS — H52.203 HYPEROPIA OF BOTH EYES WITH ASTIGMATISM AND PRESBYOPIA: Primary | ICD-10-CM

## 2023-12-02 DIAGNOSIS — H25.13 AGE-RELATED NUCLEAR CATARACT OF BOTH EYES: ICD-10-CM

## 2023-12-02 DIAGNOSIS — H52.03 HYPEROPIA OF BOTH EYES WITH ASTIGMATISM AND PRESBYOPIA: Primary | ICD-10-CM

## 2023-12-02 DIAGNOSIS — H52.4 HYPEROPIA OF BOTH EYES WITH ASTIGMATISM AND PRESBYOPIA: Primary | ICD-10-CM

## 2023-12-02 PROCEDURE — 92015 DETERMINE REFRACTIVE STATE: CPT | Performed by: OPTOMETRIST

## 2023-12-02 PROCEDURE — 92004 COMPRE OPH EXAM NEW PT 1/>: CPT | Performed by: OPTOMETRIST

## 2023-12-02 ASSESSMENT — VISUAL ACUITY
OD_SC+: -2
OS_SC: 20/40
METHOD: SNELLEN - LINEAR
OD_SC: 20/30

## 2023-12-02 ASSESSMENT — CONF VISUAL FIELD
OS_SUPERIOR_TEMPORAL_RESTRICTION: 0
OD_INFERIOR_TEMPORAL_RESTRICTION: 0
OD_SUPERIOR_TEMPORAL_RESTRICTION: 0
OS_NORMAL: 1
OS_SUPERIOR_NASAL_RESTRICTION: 0
OS_INFERIOR_NASAL_RESTRICTION: 0
OS_INFERIOR_TEMPORAL_RESTRICTION: 0
METHOD: COUNTING FINGERS
OD_INFERIOR_NASAL_RESTRICTION: 0
OD_SUPERIOR_NASAL_RESTRICTION: 0
OD_NORMAL: 1

## 2023-12-02 ASSESSMENT — SLIT LAMP EXAM - LIDS
COMMENTS: NORMAL
COMMENTS: NORMAL

## 2023-12-02 ASSESSMENT — REFRACTION_MANIFEST
OS_AXIS: 175
OS_SPHERE: -0.75
OD_AXIS: 035
OS_ADD: +2.50
OD_CYLINDER: +0.75
OD_SPHERE: -0.50
OS_CYLINDER: +0.75
OD_ADD: +2.50

## 2023-12-02 ASSESSMENT — EXTERNAL EXAM - LEFT EYE: OS_EXAM: NORMAL

## 2023-12-02 ASSESSMENT — TONOMETRY
OD_IOP_MMHG: 17
OS_IOP_MMHG: 18
IOP_METHOD: ICARE

## 2023-12-02 ASSESSMENT — CUP TO DISC RATIO
OS_RATIO: 0.25
OD_RATIO: 0.35

## 2023-12-02 ASSESSMENT — EXTERNAL EXAM - RIGHT EYE: OD_EXAM: NORMAL

## 2023-12-02 NOTE — NURSING NOTE
Chief Complaints and History of Present Illnesses   Patient presents with    COMPREHENSIVE EYE EXAM     Chief Complaint(s) and History of Present Illness(es)       COMPREHENSIVE EYE EXAM              Laterality: both eyes    Context: near vision    Course: gradually worsening    Associated symptoms: Negative for eye pain, flashes and floaters    Treatments tried: eye drops              Comments    Romi Cortez is a(n) 63 year old female who presents for a comprehensive exam. Last eye exam was 1 year(s) ago. Since exam, vision has worsened for near and about the same for distance. Uses lubricating drops. No flashes and floaters. No eye pain.     Christopher Nolen COT 8:43 AM December 2, 2023

## 2023-12-02 NOTE — PROGRESS NOTES
Assessment & Plan       Romi Cortez is a 63 year old female with the following diagnoses:   1. Hyperopia of both eyes with astigmatism and presbyopia - Both Eyes    2. Dry eyes, bilateral - Both Eyes    3. Age-related nuclear cataract of both eyes - Both Eyes          New prescription for glasses   Dry eyes cont lubricants  Cataracts follow  Yearly exam     Patient disposition:   No follow-ups on file.          Complete documentation of historical and exam elements from today's encounter can be found in the full encounter summary report (not reduplicated in this progress note). I personally obtained the chief complaint(s) and history of present illness.  I confirmed and edited as necessary the review of systems, past medical/surgical history, family history, social history, and examination findings as documented by others; and I examined the patient myself. I personally reviewed the relevant tests, images, and reports as documented above. I formulated and edited as necessary the assessment and plan and discussed the findings and management plan with the patient and family.  Dr. Jackson Ross

## 2023-12-16 DIAGNOSIS — F90.0 ADHD (ATTENTION DEFICIT HYPERACTIVITY DISORDER), INATTENTIVE TYPE: ICD-10-CM

## 2023-12-19 RX ORDER — ATOMOXETINE 40 MG/1
40 CAPSULE ORAL DAILY
Qty: 30 CAPSULE | Refills: 1 | Status: SHIPPED | OUTPATIENT
Start: 2023-12-19 | End: 2024-01-22

## 2023-12-19 RX ORDER — DESVENLAFAXINE 50 MG/1
50 TABLET, FILM COATED, EXTENDED RELEASE ORAL DAILY
Qty: 30 TABLET | Refills: 1 | Status: SHIPPED | OUTPATIENT
Start: 2023-12-19 | End: 2024-01-22

## 2023-12-19 NOTE — TELEPHONE ENCOUNTER
Medication requested: desvenlafaxine (PRISTIQ) 50 MG 24 hr tablet  Last refilled: 10/19/2023  Qty: 30/1     Medication requested: atomoxetine (STRATTERA) 40 MG capsule  Last refilled: 10/19/2023  Qty: 30/1    Last seen: 11/27/2023  [facility]     Kimberly Ulrich APRN CNP     RTC:   8 weeks, sooner as needed   Cancel: 0  No-show: 0  Next appt: 1/22/24     Refill decision: Refilled for 30 days per protocol + 1 RF to get to next psych appt.

## 2024-01-14 ENCOUNTER — HEALTH MAINTENANCE LETTER (OUTPATIENT)
Age: 64
End: 2024-01-14

## 2024-01-22 ENCOUNTER — VIRTUAL VISIT (OUTPATIENT)
Dept: PSYCHIATRY | Facility: CLINIC | Age: 64
End: 2024-01-22
Attending: NURSE PRACTITIONER
Payer: COMMERCIAL

## 2024-01-22 DIAGNOSIS — F90.0 ADHD (ATTENTION DEFICIT HYPERACTIVITY DISORDER), INATTENTIVE TYPE: ICD-10-CM

## 2024-01-22 DIAGNOSIS — F31.81 BIPOLAR II DISORDER (H): ICD-10-CM

## 2024-01-22 PROCEDURE — 99214 OFFICE O/P EST MOD 30 MIN: CPT | Mod: 95 | Performed by: NURSE PRACTITIONER

## 2024-01-22 RX ORDER — ATOMOXETINE 40 MG/1
40 CAPSULE ORAL DAILY
Qty: 30 CAPSULE | Refills: 2 | Status: SHIPPED | OUTPATIENT
Start: 2024-01-22 | End: 2024-04-18

## 2024-01-22 RX ORDER — DESVENLAFAXINE 50 MG/1
50 TABLET, FILM COATED, EXTENDED RELEASE ORAL DAILY
Qty: 30 TABLET | Refills: 2 | Status: SHIPPED | OUTPATIENT
Start: 2024-01-22 | End: 2024-04-18

## 2024-01-22 RX ORDER — LAMOTRIGINE 200 MG/1
200 TABLET ORAL DAILY
Qty: 30 TABLET | Refills: 2 | Status: SHIPPED | OUTPATIENT
Start: 2024-01-22 | End: 2024-04-18

## 2024-01-22 ASSESSMENT — PATIENT HEALTH QUESTIONNAIRE - PHQ9
SUM OF ALL RESPONSES TO PHQ QUESTIONS 1-9: 7
10. IF YOU CHECKED OFF ANY PROBLEMS, HOW DIFFICULT HAVE THESE PROBLEMS MADE IT FOR YOU TO DO YOUR WORK, TAKE CARE OF THINGS AT HOME, OR GET ALONG WITH OTHER PEOPLE: SOMEWHAT DIFFICULT
SUM OF ALL RESPONSES TO PHQ QUESTIONS 1-9: 7

## 2024-01-22 NOTE — NURSING NOTE
Is the patient currently in the state of [place]? YES    Visit mode:VIDEO    If the visit is dropped, the patient can be reconnected by: VIDEO VISIT: Text to cell phone:   Telephone Information:   Mobile [phone]       Will anyone else be joining the visit? NO  (If patient encounters technical issues they should call [phone]    How would you like to obtain your AVS? MyChart    Are changes needed to the allergy or medication list? Pt stated no changes to allergies and Pt stated no med changes    Reason for visit: AVILA TALBOT
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
## 2024-01-29 ENCOUNTER — VIRTUAL VISIT (OUTPATIENT)
Dept: SLEEP MEDICINE | Facility: CLINIC | Age: 64
End: 2024-01-29
Payer: COMMERCIAL

## 2024-01-29 VITALS — WEIGHT: 200 LBS | BODY MASS INDEX: 32.14 KG/M2 | HEIGHT: 66 IN

## 2024-01-29 DIAGNOSIS — G47.33 OSA (OBSTRUCTIVE SLEEP APNEA): Primary | ICD-10-CM

## 2024-01-29 PROCEDURE — 99213 OFFICE O/P EST LOW 20 MIN: CPT | Mod: 95

## 2024-01-29 ASSESSMENT — SLEEP AND FATIGUE QUESTIONNAIRES
HOW LIKELY ARE YOU TO NOD OFF OR FALL ASLEEP WHILE SITTING AND TALKING TO SOMEONE: WOULD NEVER DOZE
HOW LIKELY ARE YOU TO NOD OFF OR FALL ASLEEP WHILE LYING DOWN TO REST IN THE AFTERNOON WHEN CIRCUMSTANCES PERMIT: WOULD NEVER DOZE
HOW LIKELY ARE YOU TO NOD OFF OR FALL ASLEEP WHILE SITTING AND READING: WOULD NEVER DOZE
HOW LIKELY ARE YOU TO NOD OFF OR FALL ASLEEP WHILE WATCHING TV: SLIGHT CHANCE OF DOZING
HOW LIKELY ARE YOU TO NOD OFF OR FALL ASLEEP WHILE SITTING QUIETLY AFTER LUNCH WITHOUT ALCOHOL: WOULD NEVER DOZE
HOW LIKELY ARE YOU TO NOD OFF OR FALL ASLEEP WHEN YOU ARE A PASSENGER IN A CAR FOR AN HOUR WITHOUT A BREAK: WOULD NEVER DOZE
HOW LIKELY ARE YOU TO NOD OFF OR FALL ASLEEP IN A CAR, WHILE STOPPED FOR A FEW MINUTES IN TRAFFIC: WOULD NEVER DOZE
HOW LIKELY ARE YOU TO NOD OFF OR FALL ASLEEP WHILE SITTING INACTIVE IN A PUBLIC PLACE: WOULD NEVER DOZE

## 2024-01-29 ASSESSMENT — PAIN SCALES - GENERAL: PAINLEVEL: NO PAIN (0)

## 2024-01-29 NOTE — PROGRESS NOTES
Virtual Visit Details  Type of service: Video Visit   Video Start Time: 9:40 AM  Video End Time: 9:53 AM  Originating Location (pt. Location): Home  Distant Location (provider location): On-site  Platform used for Video Visit: [facility]    Sleep Apnea - Follow-up Visit:    Impression/Plan:  (G47.33) ALE (obstructive sleep apnea) (primary encounter diagnosis)  Comment: Romi presents for follow-up of her severe sleep apnea, managed with CPAP. She is here to document compliance with her machine received on 11/09/2023. Things are going pretty well with her CPAP. She uses her CPAP every night and feels more rested with use. She is waking up less in the night with CPAP. She used her machine 30/30 days and 100% of days >4 hours of use. She is tolerating the CPAP pressures. Her download shows her severe apnea is well controlled with a residual AHI of 2.0 events per hour.     Plan: Comprehensive DME  Continue auto-PAP 7.0-10.0 cm H2O. A prescription was written for new supplies. We reviewed recommendations for cleaning and replacing supplies.     Romi Cortez will follow up in about 1 year(s).     Total time spent reviewing medical records, history and physical examination, review of previous testing and interpretation as well as documentation on this date: 18 minutes    CC: Mayra Persaud MD    History of Present Illness:  Chief Complaint   Patient presents with    RECHECK     Romi Cortez presents for follow-up of her severe sleep apnea, managed with CPAP. She is here to document compliance with her machine received on 11/09/2023. Things are going pretty well with her CPAP.      9/17/2023 [place] Split Sleep Study (208.0 lbs) - AHI 35.4, RDI 47.8, Supine AHI -, REM AHI 70.0, Low O2% 70.0%, Time Spent ?88% 11.3, Time Spent ?89% 27.3. Treatment was titrated to a pressure of CPAP 8 with an AHI 7.4. Time spent in REM supine at this pressure was 18.5 minutes.    DME: [facility]    Do you use a CPAP Machine at home:  Yes   Overall, on a scale of 0-10 how would you rate your CPAP (0 poor, 10 great):      What type of mask do you use: nasal pillows   Is your mask comfortable: Yes   If not, why:      Is your mask leaking: No  If yes, where do you feel it:    How many night per week does the mask leak (0-7):      Do you notice snoring with mask on: No  Do you notice gasping arousals with mask on: No   Are you having significant oral or nasal dryness: No, she was getting a dry mouth right away, but now she is not.    Is the pressure setting comfortable: Yes   If not, why:      She is using the water chamber.     What is your typical bedtime: Variable  How long does it take you to go to sleep on PAP therapy: If she reads, she can fall asleep pretty quickly.    What time do you typically get out of bed for the day: Variable, she is partly retired. 9-10 AM    How many hours on average per night are you using PAP therapy: 7 hours 15 minutes  How many hours are you sleeping per night: 7 hours   Do you feel well rested in the morning: Yes, she usually feels rested in the morning.    She is waking up less in the night with CPAP.     ResMed AirSense 10  Auto-PAP 7.0 - 10.0 cmH2O 30 day usage data: 12/25/2023-01/23/2024    100% of days with > 4 hours of use. 0/30 days with no use.   Average use 7 hours 15 minutes per day.   95%ile Leak 17.6 L/min.   CPAP 95% pressure 10.0 cm.   AHI 2.03 events per hour.      EPWORTH SLEEPINESS SCALE         1/29/2024     9:10 AM    [place]lora Sleepiness Scale ( VANDANA Witt  6523-1221<br>ESS - [place]/English - Final version - 21 Nov 07 - [facility] [place].)   Sitting and reading Would never doze   Watching TV Slight chance of dozing   Sitting, inactive in a public place (e.g. a theatre or a meeting) Would never doze   As a passenger in a car for an hour without a break Would never doze   Lying down to rest in the afternoon when circumstances permit Would never doze   Sitting and talking to someone Would never  doze   Sitting quietly after a lunch without alcohol Would never doze   In a car, while stopped for a few minutes in traffic Would never doze   Esperance Score (MC) 1   Esperance Score (Sleep) 1       INSOMNIA SEVERITY INDEX (ASHLEE)          1/29/2024     9:11 AM   Insomnia Severity Index (ASHLEE)   Difficulty falling asleep 0   Difficulty staying asleep 0   Problems waking up too early 0   How SATISFIED/DISSATISFIED are you with your CURRENT sleep pattern? 1   How NOTICEABLE to others do you think your sleep problem is in terms of impairing the quality of your life? 0   How WORRIED/DISTRESSED are you about your current sleep problem? 0   To what extent do you consider your sleep problem to INTERFERE with your daily functioning (e.g. daytime fatigue, mood, ability to function at work/daily chores, concentration, memory, mood, etc.) CURRENTLY? 1   ASHLEE Total Score 2       Guidelines for Scoring/Interpretation:  Total score categories:  0-7 = No clinically significant insomnia   8-14 = Subthreshold insomnia   15-21 = Clinical insomnia (moderate severity)  22-28 = Clinical insomnia (severe)  Used via courtesy of www.[place].gov with permission from Nate Jean PhD., [facility]      Past medical/surgical history, family history, social history, medications and allergies were reviewed.        Problem List:  Patient Active Problem List    Diagnosis Date Noted    Essential (primary) hypertension 11/18/2022     Priority: Medium    Traumatic complete tear of right rotator cuff, subsequent encounter 01/14/2022     Priority: Medium     Added automatically from request for surgery [id number]      Bipolar II disorder, most recent episode major depressive (H) 12/19/2021     Priority: Medium    Unspecified temporomandibular joint disorder, unspecified side 01/28/2019     Priority: Medium     Formatting of this note might be different from the original.  Overview Note: TEMPOROMANDIBULAR JOINT DISORDER, UNSPECIFIED #[id number]#     "EXT_ID: [id number]      Sleep apnea 04/13/2018     Priority: Medium     Formatting of this note might be different from the original.  Overview Note: SLEEP APNEA #[id number]#    EXT_ID: [id number]      Injury of forearm muscle or tendon, unspecified laterality, subsequent encounter 08/01/2016     Priority: Medium    Bipolar affective disorder, currently depressed, moderate (H) 01/13/2016     Priority: Medium     Formatting of this note might be different from the original.  Overview Note: Other bipolar disorders #[id number]#    EXT_ID: [id number]      Dermatitis, seborrheic 09/15/2015     Priority: Medium    AK (actinic keratosis) 09/15/2015     Priority: Medium    Senile sebaceous gland hyperplasia 09/15/2015     Priority: Medium    Lentigines 09/15/2015     Priority: Medium    Appendicitis 11/21/2013     Priority: Medium    Depressive disorder 10/07/2005     Priority: Medium        Ht 1.676 m (5' 6\")   Wt 90.7 kg (200 lb)   BMI 32.28 kg/m      JACK Snowden CNP 1/29/2024  "

## 2024-01-29 NOTE — NURSING NOTE
Is the patient currently in the state of [place]? YES    Visit mode:VIDEO    If the visit is dropped, the patient can be reconnected by: VIDEO VISIT: Send to e-mail at: [email or web address]    Will anyone else be joining the visit? NO  (If patient encounters technical issues they should call [phone]    How would you like to obtain your AVS? MyChart    Are changes needed to the allergy or medication list? Pt stated no changes to allergies and Pt stated no med changes    Reason for visit: RECHECK  Has patient had flu shot for current/most recent flu season? If so, when? Yes: 11/27/2023    Shelby TALBOT

## 2024-04-18 ENCOUNTER — VIRTUAL VISIT (OUTPATIENT)
Dept: PSYCHIATRY | Facility: CLINIC | Age: 64
End: 2024-04-18
Attending: NURSE PRACTITIONER
Payer: COMMERCIAL

## 2024-04-18 VITALS — HEIGHT: 66 IN | WEIGHT: 200 LBS | BODY MASS INDEX: 32.14 KG/M2

## 2024-04-18 DIAGNOSIS — F31.81 BIPOLAR II DISORDER (H): ICD-10-CM

## 2024-04-18 DIAGNOSIS — F90.0 ADHD (ATTENTION DEFICIT HYPERACTIVITY DISORDER), INATTENTIVE TYPE: ICD-10-CM

## 2024-04-18 PROCEDURE — 99214 OFFICE O/P EST MOD 30 MIN: CPT | Mod: 95 | Performed by: NURSE PRACTITIONER

## 2024-04-18 RX ORDER — ATOMOXETINE 40 MG/1
40 CAPSULE ORAL DAILY
Qty: 30 CAPSULE | Refills: 2 | Status: SHIPPED | OUTPATIENT
Start: 2024-04-18 | End: 2024-07-11

## 2024-04-18 RX ORDER — LAMOTRIGINE 200 MG/1
200 TABLET ORAL DAILY
Qty: 30 TABLET | Refills: 2 | Status: SHIPPED | OUTPATIENT
Start: 2024-04-18 | End: 2024-07-11

## 2024-04-18 RX ORDER — DESVENLAFAXINE 50 MG/1
50 TABLET, FILM COATED, EXTENDED RELEASE ORAL DAILY
Qty: 30 TABLET | Refills: 2 | Status: SHIPPED | OUTPATIENT
Start: 2024-04-18 | End: 2024-07-11

## 2024-04-18 ASSESSMENT — PATIENT HEALTH QUESTIONNAIRE - PHQ9
SUM OF ALL RESPONSES TO PHQ QUESTIONS 1-9: 6
SUM OF ALL RESPONSES TO PHQ QUESTIONS 1-9: 6
10. IF YOU CHECKED OFF ANY PROBLEMS, HOW DIFFICULT HAVE THESE PROBLEMS MADE IT FOR YOU TO DO YOUR WORK, TAKE CARE OF THINGS AT HOME, OR GET ALONG WITH OTHER PEOPLE: SOMEWHAT DIFFICULT

## 2024-04-18 ASSESSMENT — PAIN SCALES - GENERAL: PAINLEVEL: MILD PAIN (2)

## 2024-04-18 NOTE — PATIENT INSTRUCTIONS
**For crisis resources, please see the information at the end of this document**   Patient Education    Thank you for coming to the [facility] [place] [facility].     Lab Testing:  If you had lab testing today and your results are reassuring or normal they will be mailed to you or sent through [employer] within 7 days. If the lab tests need quick action we will call you with the results. The phone number we will call with results is # [phone]. If this is not the best number please call our clinic and change the number.     Medication Refills:  If you need any refills please call your pharmacy and they will contact us. Our fax number for refills is [phone].   Three business days of notice are needed for general medication refill requests.   Five business days of notice are needed for controlled substance refill requests.   If you need to change to a different pharmacy, please contact the new pharmacy directly. The new pharmacy will help you get your medications transferred.     Contact Us:  Please call [phone] during business hours (8-5:00 M-F).   If you have medication related questions after clinic hours, or on the weekend, please call [phone].     Financial Assistance [phone]   Medical Records [phone]       MENTAL HEALTH CRISIS RESOURCES:  For a emergency help, please call 911 or go to the nearest Emergency Department.     Emergency Walk-In Options:   EmPATH Unit @ [place] ([place]): [phone] - Specialized mental health emergency area designed to be calming  [facility] ([place]): [phone]  [facility] ([place]): [phone]  [facility]): [phone]    [facility] Crisis Information:   [place]: [phone]  [place]: [phone]  [place] ([place] - Adult: [phone]     Child: [phone]  [place] - Adult: [phone]     Child: [phone]  [place]:  [phone]  List of all [facility] resources:   https://[place].gov/dhs/people-we-serve/adults/health-care/mental-health/resources/crisis-contacts.jsp    National Crisis Information:   Crisis Text Line: Text  [place]  to 151864  Suicide & Crisis Lifeline: 988  National Suicide Prevention Lifeline: 9-728-056-TALK (1-755.671.6709)       For online chat options, visit https://suicidepreventionlifeline.org/chat/  Poison Control Center: 5-848-660-9049  Trans Lifeline: 1-355-027-6342 - Hotline for transgender people of all ages  The [employer]ov[employer] Project: 6-147-118-7351 - Hotline for LGBT youth     For Non-Emergency Support:   Fast Tracker: Mental Health & Substance Use Disorder Resources -   https://www.[place]uff[place].org/

## 2024-04-18 NOTE — NURSING NOTE
Is the patient currently in the state of [place]? YES    Visit mode:VIDEO    If the visit is dropped, the patient can be reconnected by: VIDEO VISIT: Text to cell phone:   Telephone Information:   Mobile [phone]       Will anyone else be joining the visit? NO  (If patient encounters technical issues they should call [phone])    How would you like to obtain your AVS? MyChart    Are changes needed to the allergy or medication list? No    Are refills needed on medications prescribed by this physician? NO    Reason for visit: RECHECK    Maryjo TALBOT
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
## 2024-05-16 ENCOUNTER — OFFICE VISIT (OUTPATIENT)
Dept: ORTHOPEDICS | Facility: CLINIC | Age: 64
End: 2024-05-16
Payer: COMMERCIAL

## 2024-05-16 ENCOUNTER — PRE VISIT (OUTPATIENT)
Dept: ORTHOPEDICS | Facility: CLINIC | Age: 64
End: 2024-05-16

## 2024-05-16 VITALS — HEIGHT: 66 IN | WEIGHT: 212 LBS | BODY MASS INDEX: 34.07 KG/M2

## 2024-05-16 DIAGNOSIS — M17.11 PRIMARY LOCALIZED OSTEOARTHRITIS OF RIGHT KNEE: ICD-10-CM

## 2024-05-16 DIAGNOSIS — M17.12 PRIMARY OSTEOARTHRITIS OF LEFT KNEE: Primary | ICD-10-CM

## 2024-05-16 PROCEDURE — 99213 OFFICE O/P EST LOW 20 MIN: CPT | Mod: 25 | Performed by: FAMILY MEDICINE

## 2024-05-16 PROCEDURE — 20610 DRAIN/INJ JOINT/BURSA W/O US: CPT | Mod: LT | Performed by: FAMILY MEDICINE

## 2024-05-16 RX ORDER — TRIAMCINOLONE ACETONIDE 40 MG/ML
40 INJECTION, SUSPENSION INTRA-ARTICULAR; INTRAMUSCULAR
Status: SHIPPED | OUTPATIENT
Start: 2024-05-16

## 2024-05-16 RX ORDER — LIDOCAINE HYDROCHLORIDE 10 MG/ML
4 INJECTION, SOLUTION EPIDURAL; INFILTRATION; INTRACAUDAL; PERINEURAL
Status: SHIPPED | OUTPATIENT
Start: 2024-05-16

## 2024-05-16 RX ADMIN — TRIAMCINOLONE ACETONIDE 40 MG: 40 INJECTION, SUSPENSION INTRA-ARTICULAR; INTRAMUSCULAR at 09:38

## 2024-05-16 RX ADMIN — LIDOCAINE HYDROCHLORIDE 4 ML: 10 INJECTION, SOLUTION EPIDURAL; INFILTRATION; INTRACAUDAL; PERINEURAL at 09:38

## 2024-05-16 NOTE — PROGRESS NOTES
"Sports Medicine Clinic Visit    PCP: Mayra Persaud Kaylie Cortez is a 64 year old female who is seen  as self referral presenting with left greater than right knee pain.    Her left knee bothers her with biking hills, kneeling, rising from a chair, going up and down stairs.  The right knee can bother her in a similar way but not as consistently.  She indicates that she has had sciatica in the past and her right leg and she knows what the symptoms are like, she does not feel that she is dealing with sciatica currently.    Injury: She was biking a lot a few years ago and started to have worsening and knee and leg pain.     Location of Pain: bilateral knees/legs; worse on left side  Duration of Pain: several years  Rating of Pain: 9/10   Pain is better with: Nothing  Pain is worse with: Knee flexion, pain in calf at night  Additional Features: Worst pain at night and initially in the morning  Treatment so far consists of: No Treatment tried to date  Prior History of related problems: Previous knee pain saw Dr. Kitchen 11/4/22    Ht 1.676 m (5' 5.98\")   Wt 96.2 kg (212 lb)   BMI 34.24 kg/m              AP view of the bilateral knees 11/4/2022 consistent with mild degenerative DJD.  X-ray of the right knee consistent with moderate patellofemoral arthritis.      PMH:  Past Medical History:   Diagnosis Date    Hypertension        Active problem list:  Patient Active Problem List   Diagnosis    Appendicitis    Dermatitis, seborrheic    AK (actinic keratosis)    Senile sebaceous gland hyperplasia    Lentigines    Injury of forearm muscle or tendon, unspecified laterality, subsequent encounter    Traumatic complete tear of right rotator cuff, subsequent encounter    Bipolar affective disorder, currently depressed, moderate (H)    Bipolar II disorder, most recent episode major depressive (H)    Depressive disorder    Sleep apnea    Essential (primary) hypertension    Unspecified temporomandibular joint disorder, " unspecified side       FH:  Family History   Problem Relation Age of Onset    Cancer Mother         skin cancer    Glaucoma Mother     Skin Cancer Mother     Melanoma Brother        SH:  Social History     Socioeconomic History    Marital status:      Spouse name: Not on file    Number of children: Not on file    Years of education: Not on file    Highest education level: Not on file   Occupational History    Not on file   Tobacco Use    Smoking status: Never    Smokeless tobacco: Never   Vaping Use    Vaping status: Never Used   Substance and Sexual Activity    Alcohol use: Yes     Comment: 2/week    Drug use: No    Sexual activity: Not on file   Other Topics Concern    Parent/sibling w/ CABG, MI or angioplasty before 65F 55M? Not Asked   Social History Narrative    Not on file     Social Determinants of Health     Financial Resource Strain: Not on File (2019)    Received from [facility]     Financial Resource Strain     Financial Resource Strain: 0   Food Insecurity: Not on File (2019)    Received from [facility]     Food Insecurity     Food: 0   Transportation Needs: Not on File (2019)    Received from [facility]     Transportation Needs     Transportation: 0   Physical Activity: Not on File (2019)    Received from [facility]     Physical Activity     Physical Activity: 0   Stress: Not on File (2019)    Received from [facility]     Stress     Stress: 0   Social Connections: Not on File (2019)    Received from [facility]     Social Connections     Social Connections and Isolation: 0   Interpersonal Safety: Not on file   Housing Stability: Not on File (2019)    Received from [facility]     Housing Stability     Housin       MEDS:  See EMR, reviewed  ALL:  See EMR, reviewed    REVIEW OF SYSTEMS:  CONSTITUTIONAL:NEGATIVE for fever, chills, change in weight  INTEGUMENTARY/SKIN: NEGATIVE for worrisome rashes, moles or lesions  EYES: NEGATIVE for vision  changes or irritation  ENT/MOUTH: NEGATIVE for ear, mouth and throat problems  RESP:NEGATIVE for significant cough or SOB  BREAST: NEGATIVE for masses, tenderness or discharge  CV: NEGATIVE for chest pain, palpitations or peripheral edema  GI: NEGATIVE for nausea, abdominal pain, heartburn, or change in bowel habits  :NEGATIVE for frequency, dysuria, or hematuria  :NEGATIVE for frequency, dysuria, or hematuria  NEURO: NEGATIVE for weakness, dizziness or paresthesias  ENDOCRINE: NEGATIVE for temperature intolerance, skin/hair changes  HEME/ALLERGY/IMMUNE: NEGATIVE for bleeding problems  PSYCHIATRIC: NEGATIVE for changes in mood or affect      Objective: The bilateral knees reveal no significant varus or valgus malalignment.  No effusion in either knee.  Tender over the medial joint lines of both knees, nontender over the lateral joint lines.  Nontender at the patellar tendon or pes anserine bursa of the left knee.  Active range of motion of the bilateral hips.  No swelling in the popliteal space or tenderness in the calf of either knee.  Straight leg raise is negative bilaterally.  Sensation is normal distally.  Appropriate conversation and affect.    We reviewed x-rays of her knees from 2022 that show moderate patellofemoral DJD and moderate medial tibiofemoral DJD.    Assessment bilateral knee DJD left greater than right.    Plan: Patient would like to try cortisone injection for the left knee and see physical therapy.  After informed consent about bleeding, infection, steroid flare after prep and surgical scrub she was injected in the left knee from a lateral approach in the seated position with 1 cc of Kenalog 40 and 5 cc of 1% lidocaine.  The medicine went in easily, she left the clinic ambulatory, she will look for improvement the injection and follow-up as needed.  Physical therapy referral placed.      Large Joint Injection: L knee joint    Date/Time: 5/16/2024 9:38 AM    Performed by: Stanford Marroquin  MD Ozzie  Authorized by: Stanford Marroquin MD    Indications:  Pain  Needle Size:  25 G  Guidance: landmark guided    Approach:  Anterolateral  Location:  Knee      Medications:  40 mg triamcinolone 40 MG/ML; 4 mL lidocaine (PF) 1 %  Outcome:  Tolerated well, no immediate complications  Procedure discussed: discussed risks, benefits, and alternatives    Consent Given by:  Patient  Timeout: timeout called immediately prior to procedure    Prep: patient was prepped and draped in usual sterile fashion

## 2024-05-16 NOTE — LETTER
"  5/16/2024      RE: Romi Cortez  [address] [facility] [address]     Dear Colleague,    Thank you for referring your patient, Romi Cortez, to the [facility] [place]. Please see a copy of my visit note below.    Sports Medicine Clinic Visit    PCP: Mayra Persaud    Romi Cortez is a 64 year old female who is seen  as self referral presenting with left greater than right knee pain.    Her left knee bothers her with biking hills, kneeling, rising from a chair, going up and down stairs.  The right knee can bother her in a similar way but not as consistently.  She indicates that she has had sciatica in the past and her right leg and she knows what the symptoms are like, she does not feel that she is dealing with sciatica currently.    Injury: She was biking a lot a few years ago and started to have worsening and knee and leg pain.     Location of Pain: bilateral knees/legs; worse on left side  Duration of Pain: several years  Rating of Pain: 9/10   Pain is better with: Nothing  Pain is worse with: Knee flexion, pain in calf at night  Additional Features: Worst pain at night and initially in the morning  Treatment so far consists of: No Treatment tried to date  Prior History of related problems: Previous knee pain saw Dr. Kitchen 11/4/22    Ht 1.676 m (5' 5.98\")   Wt 96.2 kg (212 lb)   BMI 34.24 kg/m              AP view of the bilateral knees 11/4/2022 consistent with mild degenerative DJD.  X-ray of the right knee consistent with moderate patellofemoral arthritis.      PMH:  Past Medical History:   Diagnosis Date     Hypertension        Active problem list:  Patient Active Problem List   Diagnosis     Appendicitis     Dermatitis, seborrheic     AK (actinic keratosis)     Senile sebaceous gland hyperplasia     Lentigines     Injury of forearm muscle or tendon, unspecified laterality, subsequent encounter     Traumatic complete tear of right rotator cuff, " subsequent encounter     Bipolar affective disorder, currently depressed, moderate (H)     Bipolar II disorder, most recent episode major depressive (H)     Depressive disorder     Sleep apnea     Essential (primary) hypertension     Unspecified temporomandibular joint disorder, unspecified side       FH:  Family History   Problem Relation Age of Onset     Cancer Mother         skin cancer     Glaucoma Mother      Skin Cancer Mother      Melanoma Brother        SH:  Social History     Socioeconomic History     Marital status:      Spouse name: Not on file     Number of children: Not on file     Years of education: Not on file     Highest education level: Not on file   Occupational History     Not on file   Tobacco Use     Smoking status: Never     Smokeless tobacco: Never   Vaping Use     Vaping status: Never Used   Substance and Sexual Activity     Alcohol use: Yes     Comment: 2/week     Drug use: No     Sexual activity: Not on file   Other Topics Concern     Parent/sibling w/ CABG, MI or angioplasty before 65F 55M? Not Asked   Social History Narrative     Not on file     Social Determinants of Health     Financial Resource Strain: Not on File (11/8/2019)    Received from [facility]     Financial Resource Strain      Financial Resource Strain: 0   Food Insecurity: Not on File (11/8/2019)    Received from [facility]     Food Insecurity      Food: 0   Transportation Needs: Not on File (11/8/2019)    Received from [facility]     Transportation Needs      Transportation: 0   Physical Activity: Not on File (11/8/2019)    Received from [facility]     Physical Activity      Physical Activity: 0   Stress: Not on File (11/8/2019)    Received from [facility]     Stress      Stress: 0   Social Connections: Not on File (11/8/2019)    Received from [facility]     Social Connections      Social Connections and Isolation: 0   Interpersonal Safety: Not on file   Housing Stability: Not on File (11/8/2019)     Received from [facility]     [facility] Stability      Housin       MEDS:  See EMR, reviewed  ALL:  See EMR, reviewed    REVIEW OF SYSTEMS:  CONSTITUTIONAL:NEGATIVE for fever, chills, change in weight  INTEGUMENTARY/SKIN: NEGATIVE for worrisome rashes, moles or lesions  EYES: NEGATIVE for vision changes or irritation  ENT/MOUTH: NEGATIVE for ear, mouth and throat problems  RESP:NEGATIVE for significant cough or SOB  BREAST: NEGATIVE for masses, tenderness or discharge  CV: NEGATIVE for chest pain, palpitations or peripheral edema  GI: NEGATIVE for nausea, abdominal pain, heartburn, or change in bowel habits  :NEGATIVE for frequency, dysuria, or hematuria  :NEGATIVE for frequency, dysuria, or hematuria  NEURO: NEGATIVE for weakness, dizziness or paresthesias  ENDOCRINE: NEGATIVE for temperature intolerance, skin/hair changes  HEME/ALLERGY/IMMUNE: NEGATIVE for bleeding problems  PSYCHIATRIC: NEGATIVE for changes in mood or affect      Objective: The bilateral knees reveal no significant varus or valgus malalignment.  No effusion in either knee.  Tender over the medial joint lines of both knees, nontender over the lateral joint lines.  Nontender at the patellar tendon or pes anserine bursa of the left knee.  Active range of motion of the bilateral hips.  No swelling in the popliteal space or tenderness in the calf of either knee.  Straight leg raise is negative bilaterally.  Sensation is normal distally.  Appropriate conversation and affect.    We reviewed x-rays of her knees from  that show moderate patellofemoral DJD and moderate medial tibiofemoral DJD.    Assessment bilateral knee DJD left greater than right.    Plan: Patient would like to try cortisone injection for the left knee and see physical therapy.  After informed consent about bleeding, infection, steroid flare after prep and surgical scrub she was injected in the left knee from a lateral approach in the seated position with 1 cc of Kenalog  40 and 5 cc of 1% lidocaine.  The medicine went in easily, she left the clinic ambulatory, she will look for improvement the injection and follow-up as needed.  Physical therapy referral placed.      Large Joint Injection: L knee joint    Date/Time: 5/16/2024 9:38 AM    Performed by: Stanford Marroquin MD  Authorized by: Stanofrd Marroquin MD    Indications:  Pain  Needle Size:  25 G  Guidance: landmark guided    Approach:  Anterolateral  Location:  Knee      Medications:  40 mg triamcinolone 40 MG/ML; 4 mL lidocaine (PF) 1 %  Outcome:  Tolerated well, no immediate complications  Procedure discussed: discussed risks, benefits, and alternatives    Consent Given by:  Patient  Timeout: timeout called immediately prior to procedure    Prep: patient was prepped and draped in usual sterile fashion                    Again, thank you for allowing me to participate in the care of your patient.      Sincerely,    Stanford Marroquin MD

## 2024-06-04 ASSESSMENT — ACTIVITIES OF DAILY LIVING (ADL)
SQUAT: ACTIVITY IS VERY DIFFICULT
HOW_WOULD_YOU_RATE_THE_OVERALL_FUNCTION_OF_YOUR_KNEE_DURING_YOUR_USUAL_DAILY_ACTIVITIES?: ABNORMAL
LIMPING: THE SYMPTOM AFFECTS MY ACTIVITY MODERATELY
WEAKNESS: THE SYMPTOM AFFECTS MY ACTIVITY MODERATELY
RISE FROM A CHAIR: ACTIVITY IS VERY DIFFICULT
GIVING WAY, BUCKLING OR SHIFTING OF KNEE: THE SYMPTOM AFFECTS MY ACTIVITY MODERATELY
KNEE_ACTIVITY_OF_DAILY_LIVING_SCORE: 48.57
RISE FROM A CHAIR: ACTIVITY IS VERY DIFFICULT
HOW_WOULD_YOU_RATE_THE_CURRENT_FUNCTION_OF_YOUR_KNEE_DURING_YOUR_USUAL_DAILY_ACTIVITIES_ON_A_SCALE_FROM_0_TO_100_WITH_100_BEING_YOUR_LEVEL_OF_KNEE_FUNCTION_PRIOR_TO_YOUR_INJURY_AND_0_BEING_THE_INABILITY_TO_PERFORM_ANY_OF_YOUR_USUAL_DAILY_ACTIVITIES?: 40
WEAKNESS: THE SYMPTOM AFFECTS MY ACTIVITY MODERATELY
HOW_WOULD_YOU_RATE_THE_OVERALL_FUNCTION_OF_YOUR_KNEE_DURING_YOUR_USUAL_DAILY_ACTIVITIES?: ABNORMAL
PAIN: THE SYMPTOM AFFECTS MY ACTIVITY MODERATELY
AS_A_RESULT_OF_YOUR_KNEE_INJURY,_HOW_WOULD_YOU_RATE_YOUR_CURRENT_LEVEL_OF_DAILY_ACTIVITY?: ABNORMAL
KNEEL ON THE FRONT OF YOUR KNEE: I AM UNABLE TO DO THE ACTIVITY
WALK: ACTIVITY IS FAIRLY DIFFICULT
GO UP STAIRS: ACTIVITY IS FAIRLY DIFFICULT
STAND: ACTIVITY IS SOMEWHAT DIFFICULT
AS_A_RESULT_OF_YOUR_KNEE_INJURY,_HOW_WOULD_YOU_RATE_YOUR_CURRENT_LEVEL_OF_DAILY_ACTIVITY?: ABNORMAL
GO DOWN STAIRS: ACTIVITY IS FAIRLY DIFFICULT
GIVING WAY, BUCKLING OR SHIFTING OF KNEE: THE SYMPTOM AFFECTS MY ACTIVITY MODERATELY
PAIN: THE SYMPTOM AFFECTS MY ACTIVITY MODERATELY
STAND: ACTIVITY IS SOMEWHAT DIFFICULT
KNEEL ON THE FRONT OF YOUR KNEE: I AM UNABLE TO DO THE ACTIVITY
KNEE_ACTIVITY_OF_DAILY_LIVING_SUM: 34
WALK: ACTIVITY IS FAIRLY DIFFICULT
STIFFNESS: I DO NOT HAVE THE SYMPTOM
SWELLING: I DO NOT HAVE THE SYMPTOM
HOW_WOULD_YOU_RATE_THE_CURRENT_FUNCTION_OF_YOUR_KNEE_DURING_YOUR_USUAL_DAILY_ACTIVITIES_ON_A_SCALE_FROM_0_TO_100_WITH_100_BEING_YOUR_LEVEL_OF_KNEE_FUNCTION_PRIOR_TO_YOUR_INJURY_AND_0_BEING_THE_INABILITY_TO_PERFORM_ANY_OF_YOUR_USUAL_DAILY_ACTIVITIES?: 40
SWELLING: I DO NOT HAVE THE SYMPTOM
STIFFNESS: I DO NOT HAVE THE SYMPTOM
SIT WITH YOUR KNEE BENT: ACTIVITY IS NOT DIFFICULT
SIT WITH YOUR KNEE BENT: ACTIVITY IS NOT DIFFICULT
PLEASE_INDICATE_YOR_PRIMARY_REASON_FOR_REFERRAL_TO_THERAPY:: KNEE
LIMPING: THE SYMPTOM AFFECTS MY ACTIVITY MODERATELY
GO UP STAIRS: ACTIVITY IS FAIRLY DIFFICULT
GO DOWN STAIRS: ACTIVITY IS FAIRLY DIFFICULT
RAW_SCORE: 34
SQUAT: ACTIVITY IS VERY DIFFICULT

## 2024-06-05 ENCOUNTER — THERAPY VISIT (OUTPATIENT)
Dept: PHYSICAL THERAPY | Facility: CLINIC | Age: 64
End: 2024-06-05
Payer: COMMERCIAL

## 2024-06-05 DIAGNOSIS — M25.561 CHRONIC PAIN OF RIGHT KNEE: ICD-10-CM

## 2024-06-05 DIAGNOSIS — G89.29 CHRONIC PAIN OF RIGHT KNEE: ICD-10-CM

## 2024-06-05 DIAGNOSIS — M25.562 ACUTE PAIN OF LEFT KNEE: Primary | ICD-10-CM

## 2024-06-05 PROCEDURE — 97161 PT EVAL LOW COMPLEX 20 MIN: CPT | Mod: GP

## 2024-06-05 PROCEDURE — 97112 NEUROMUSCULAR REEDUCATION: CPT | Mod: GP

## 2024-06-05 NOTE — PROGRESS NOTES
PHYSICAL THERAPY EVALUATION  Type of Visit: Evaluation    See electronic medical record for Abuse and Falls Screening details.    Pt reports B knee pain, but it is worse on the left. Pt reports that that she lost her balance on L leg when she was favoring R leg due to pain on L and hyperextended it. Pt reports she hyperextended the knee 2 weeks ago and went to urgent care. The pain has gotten better than it was right after injury but is still painful. Pt is currently walking with one crutch to support the L side     Employment:      Hobbies/Interests: rides bike   Activity level:   HARRIS:       Patient goals for therapy:  walk and go up stairs without pain, ride bike     Pain assessment:  Location: posterior medial knee on L   Quality: sharp with some movement, extending knee too much   Worse with: walking, stairs   Better with: not extending knee, putting less weight on it.       Sleep Quality: previously poor, fine now     History of falls: no, aside from fall that started acute pain     Subjective       Presenting condition or subjective complaint: In recent years knee haa become increasingly more painful. got cortisone shot and 2 days later injured same knee   I ve been not using or minimally using that leg for  two  weeks, following the instructions of the PA at [facility].  Date of onset: 06/05/24    Relevant medical history: Arthritis; Depression; High blood pressure; Mental Illness; Overweight; Sleep disorder like apnea   Dates & types of surgery: 1995 D & C, Appendectomy around 2014, rotator cuff surgery around  2021, dental surgery 2024    Prior diagnostic imaging/testing results: X-ray     Prior therapy history for the same diagnosis, illness or injury: Yes PT at [facility] in past few years    Living Environment  Social support: With a significant other or spouse   Type of home: House; 2-story; Basement   Stairs to enter the home: Yes 5 Is there a railing: Yes   Ramp: No   Stairs inside the home: Yes  14 Is there a railing: Yes   Help at home:    Equipment owned: Straight Cane; Crutches     Employment: No    Hobbies/Interests: biking walking dancing music visual arts [employer] writing    Patient goals for therapy: Ride my bike, walk, go up and downstairs comfortably, ideally do social dancing.    Pain assessment: see above      Objective     PALPATION: Tender to palpation at pes anserine of L knee, tender to palpation at medial posterior knee     GAIT:   Weightbearing Status: PWB  Assistive Device(s): Crutch (one)  Gait Deviations: Antalgic  Trendelenburg gait on L    FUNCTIONAL MOBILITY:   Stairs with SLS:   L: unable to complete on L side, knee buckling   R: able to complete, some gluteal instability     RANGE OF MOTION:     (Degrees) AROM PROM    Left Right Left  Right   Knee Flexion 122 124     Knee Extension             STRENGTH:    Pain: - none + mild ++ moderate +++ severe  Strength Scale: 0-5/5 Left Right   Hip Flexion 4 4   Hip Extension (glute max)     Hip Abduction (glute med) 3+ 5-   Hip Adduction     Hip Internal Rotation     Hip External Rotation     Knee Flexion 5 5   Knee Extension 5 5     Assessment & Plan   CLINICAL IMPRESSIONS  Medical Diagnosis: B primary OA    Treatment Diagnosis: Chronic knee pain of R knee, acute on chronic pain of L knee   Impression/Assessment: Patient is a 64 year old female with complaints of acute L knee pain after a hyperextension injury.  The following significant findings have been identified: Pain, Decreased joint mobility, Decreased strength, Impaired balance, and Instability. These impairments interfere with their ability to perform self care tasks, recreational activities, household chores, household mobility, and community mobility as compared to previous level of function.     Clinical Decision Making (Complexity):  Clinical Presentation: Evolving/Changing  Clinical Presentation Rationale: based on medical and personal factors listed in PT  evaluation  Clinical Decision Making (Complexity): Low complexity    PLAN OF CARE  Treatment Interventions:  Interventions: Gait Training, Manual Therapy, Neuromuscular Re-education, Therapeutic Activity, Therapeutic Exercise    Long Term Goals     PT Goal 1  Goal Identifier: Stairs  Goal Description: Pt will ascend and descend one flight of stairs without knee pain  Rationale: to maximize safety and independence with performance of ADLs and functional tasks;to maximize safety and independence within the home;to maximize safety and independence within the community  Goal Progress: progressing  Target Date: 09/02/24  PT Goal 2  Goal Identifier: bike  Goal Description: Pt will be able to bike for leisure on stationary bike for 10 minutes without provocation of knee symptoms.  Rationale: to maximize safety and independence with performance of ADLs and functional tasks;to maximize safety and independence within the community  Goal Progress: progressing  Target Date: 09/02/24      Frequency of Treatment: 1x a week  Duration of Treatment: 90 days    Education Assessment:   Learner/Method: Patient  Education Comments: Pt ammenabe to tx    Risks and benefits of evaluation/treatment have been explained.   Patient/Family/caregiver agrees with Plan of Care.     Evaluation Time:           Signing Clinician: Kandy Greene, PT      [facility]                                                                                   OUTPATIENT PHYSICAL THERAPY      PLAN OF TREATMENT FOR OUTPATIENT REHABILITATION   Patient's Last Name, First Name, Romi Richardson YOB: 1960   Provider's Name   [facility]   Medical Record No.  [id number]     Onset Date: 06/05/24  Start of Care Date: 06/05/24     Medical Diagnosis:  B primary OA      PT Treatment Diagnosis:  Chronic knee pain of R knee, acute on chronic pain of L knee Plan of  Treatment  Frequency/Duration: 1x a week/ 90 days    Certification date from 06/05/24 to 09/02/24         See note for plan of treatment details and functional goals     Kandy Greene, PT                         I CERTIFY THE NEED FOR THESE SERVICES FURNISHED UNDER        THIS PLAN OF TREATMENT AND WHILE UNDER MY CARE     (Physician attestation of this document indicates review and certification of the therapy plan).              Referring Provider:  Stanford Marroquin    Initial Assessment  See Epic Evaluation- Start of Care Date: 06/05/24

## 2024-06-12 ENCOUNTER — LAB REQUISITION (OUTPATIENT)
Dept: LAB | Facility: CLINIC | Age: 64
End: 2024-06-12
Payer: COMMERCIAL

## 2024-06-12 DIAGNOSIS — I10 ESSENTIAL (PRIMARY) HYPERTENSION: ICD-10-CM

## 2024-06-12 DIAGNOSIS — E78.5 HYPERLIPIDEMIA, UNSPECIFIED: ICD-10-CM

## 2024-06-12 DIAGNOSIS — Z11.59 ENCOUNTER FOR SCREENING FOR OTHER VIRAL DISEASES: ICD-10-CM

## 2024-06-12 PROCEDURE — 86803 HEPATITIS C AB TEST: CPT | Mod: ORL | Performed by: FAMILY MEDICINE

## 2024-06-12 PROCEDURE — 80053 COMPREHEN METABOLIC PANEL: CPT | Mod: ORL | Performed by: FAMILY MEDICINE

## 2024-06-12 PROCEDURE — 80061 LIPID PANEL: CPT | Mod: ORL | Performed by: FAMILY MEDICINE

## 2024-06-13 LAB
ALBUMIN SERPL BCG-MCNC: 4.4 G/DL (ref 3.5–5.2)
ALP SERPL-CCNC: 80 U/L (ref 40–150)
ALT SERPL W P-5'-P-CCNC: 46 U/L (ref 0–50)
ANION GAP SERPL CALCULATED.3IONS-SCNC: 12 MMOL/L (ref 7–15)
AST SERPL W P-5'-P-CCNC: 27 U/L (ref 0–45)
BILIRUB SERPL-MCNC: <0.2 MG/DL
BUN SERPL-MCNC: 10 MG/DL (ref 8–23)
CALCIUM SERPL-MCNC: 9.3 MG/DL (ref 8.8–10.2)
CHLORIDE SERPL-SCNC: 104 MMOL/L (ref 98–107)
CHOLEST SERPL-MCNC: 180 MG/DL
CREAT SERPL-MCNC: 0.65 MG/DL (ref 0.51–0.95)
DEPRECATED HCO3 PLAS-SCNC: 23 MMOL/L (ref 22–29)
EGFRCR SERPLBLD CKD-EPI 2021: >90 ML/MIN/1.73M2
FASTING STATUS PATIENT QL REPORTED: NO
GLUCOSE SERPL-MCNC: 131 MG/DL (ref 70–99)
HCV AB SERPL QL IA: NONREACTIVE
HDLC SERPL-MCNC: 42 MG/DL
LDLC SERPL CALC-MCNC: 87 MG/DL
NONHDLC SERPL-MCNC: 138 MG/DL
POTASSIUM SERPL-SCNC: 4.1 MMOL/L (ref 3.4–5.3)
PROT SERPL-MCNC: 7 G/DL (ref 6.4–8.3)
SODIUM SERPL-SCNC: 139 MMOL/L (ref 135–145)
TRIGL SERPL-MCNC: 255 MG/DL

## 2024-06-17 ENCOUNTER — THERAPY VISIT (OUTPATIENT)
Dept: PHYSICAL THERAPY | Facility: CLINIC | Age: 64
End: 2024-06-17
Payer: COMMERCIAL

## 2024-06-17 DIAGNOSIS — M25.561 CHRONIC PAIN OF RIGHT KNEE: ICD-10-CM

## 2024-06-17 DIAGNOSIS — M25.562 ACUTE PAIN OF LEFT KNEE: Primary | ICD-10-CM

## 2024-06-17 DIAGNOSIS — G89.29 CHRONIC PAIN OF RIGHT KNEE: ICD-10-CM

## 2024-06-17 PROCEDURE — 97110 THERAPEUTIC EXERCISES: CPT | Mod: GP

## 2024-06-17 PROCEDURE — 97112 NEUROMUSCULAR REEDUCATION: CPT | Mod: GP

## 2024-06-26 ENCOUNTER — THERAPY VISIT (OUTPATIENT)
Dept: PHYSICAL THERAPY | Facility: CLINIC | Age: 64
End: 2024-06-26
Payer: COMMERCIAL

## 2024-06-26 DIAGNOSIS — M25.562 ACUTE PAIN OF LEFT KNEE: Primary | ICD-10-CM

## 2024-06-26 DIAGNOSIS — M25.561 CHRONIC PAIN OF RIGHT KNEE: ICD-10-CM

## 2024-06-26 DIAGNOSIS — G89.29 CHRONIC PAIN OF RIGHT KNEE: ICD-10-CM

## 2024-06-26 PROCEDURE — 97110 THERAPEUTIC EXERCISES: CPT | Mod: GP

## 2024-07-03 ENCOUNTER — THERAPY VISIT (OUTPATIENT)
Dept: PHYSICAL THERAPY | Facility: CLINIC | Age: 64
End: 2024-07-03
Payer: COMMERCIAL

## 2024-07-03 DIAGNOSIS — M25.562 ACUTE PAIN OF LEFT KNEE: Primary | ICD-10-CM

## 2024-07-03 DIAGNOSIS — G89.29 CHRONIC PAIN OF RIGHT KNEE: ICD-10-CM

## 2024-07-03 DIAGNOSIS — M25.561 CHRONIC PAIN OF RIGHT KNEE: ICD-10-CM

## 2024-07-03 PROCEDURE — 97110 THERAPEUTIC EXERCISES: CPT | Mod: GP

## 2024-07-11 ENCOUNTER — VIRTUAL VISIT (OUTPATIENT)
Dept: PSYCHIATRY | Facility: CLINIC | Age: 64
End: 2024-07-11
Attending: NURSE PRACTITIONER
Payer: COMMERCIAL

## 2024-07-11 VITALS — HEIGHT: 66 IN | BODY MASS INDEX: 34.22 KG/M2

## 2024-07-11 DIAGNOSIS — F31.81 BIPOLAR II DISORDER (H): ICD-10-CM

## 2024-07-11 DIAGNOSIS — F90.0 ADHD (ATTENTION DEFICIT HYPERACTIVITY DISORDER), INATTENTIVE TYPE: ICD-10-CM

## 2024-07-11 PROCEDURE — 99214 OFFICE O/P EST MOD 30 MIN: CPT | Mod: 95 | Performed by: NURSE PRACTITIONER

## 2024-07-11 RX ORDER — ATOMOXETINE 40 MG/1
40 CAPSULE ORAL DAILY
Qty: 30 CAPSULE | Refills: 2 | Status: SHIPPED | OUTPATIENT
Start: 2024-07-11

## 2024-07-11 RX ORDER — LAMOTRIGINE 200 MG/1
200 TABLET ORAL DAILY
Qty: 30 TABLET | Refills: 2 | Status: SHIPPED | OUTPATIENT
Start: 2024-07-11

## 2024-07-11 RX ORDER — DESVENLAFAXINE 50 MG/1
50 TABLET, FILM COATED, EXTENDED RELEASE ORAL DAILY
Qty: 30 TABLET | Refills: 2 | Status: SHIPPED | OUTPATIENT
Start: 2024-07-11

## 2024-07-11 ASSESSMENT — PATIENT HEALTH QUESTIONNAIRE - PHQ9
SUM OF ALL RESPONSES TO PHQ QUESTIONS 1-9: 5
10. IF YOU CHECKED OFF ANY PROBLEMS, HOW DIFFICULT HAVE THESE PROBLEMS MADE IT FOR YOU TO DO YOUR WORK, TAKE CARE OF THINGS AT HOME, OR GET ALONG WITH OTHER PEOPLE: SOMEWHAT DIFFICULT
SUM OF ALL RESPONSES TO PHQ QUESTIONS 1-9: 5

## 2024-07-11 ASSESSMENT — PAIN SCALES - GENERAL: PAINLEVEL: NO PAIN (0)
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
## 2024-07-11 NOTE — NURSING NOTE
Is the patient currently in the state of [place]? YES    Current patient location: [facility] [place] [id number]    Visit mode:VIDEO    If the visit is dropped, the patient can be reconnected by: VIDEO VISIT: Text to cell phone:   Telephone Information:   Mobile [phone]       Will anyone else be joining the visit? No  (If patient encounters technical issues they should call [phone]    How would you like to obtain your AVS? MyChart    Are changes needed to the allergy or medication list? No    Are refills needed on medications prescribed by this physician? NO      Reason for visit: ANTIONE Barry

## 2024-07-11 NOTE — PATIENT INSTRUCTIONS
**For crisis resources, please see the information at the end of this document**   Patient Education    Thank you for coming to the [facility].     Lab Testing:  If you had lab testing today and your results are reassuring or normal they will be mailed to you or sent through [employer] within 7 days. If the lab tests need quick action we will call you with the results. The phone number we will call with results is # [phone]. If this is not the best number please call our clinic and change the number.     Medication Refills:  If you need any refills please call your pharmacy and they will contact us. Our fax number for refills is [phone].   Three business days of notice are needed for general medication refill requests.   Five business days of notice are needed for controlled substance refill requests.   If you need to change to a different pharmacy, please contact the new pharmacy directly. The new pharmacy will help you get your medications transferred.     Contact Us:  Please call [phone] during business hours (8-5:00 M-F).   If you have medication related questions after clinic hours, or on the weekend, please call [phone].     Financial Assistance [phone]   Medical Records [phone]       MENTAL HEALTH CRISIS RESOURCES:  For a emergency help, please call 911 or go to the nearest Emergency Department.     Emergency Walk-In Options:   EmPATH Unit @ [facility] ([place]): [phone] - Specialized mental health emergency area designed to be calming  [facility] ([place]): [phone]  [facility] ([place]): [phone]  [facility]): [phone]    [facility] Crisis Information:   [place]: [phone]  [place]: [phone]  [place] ([place]) - Adult: [phone]     Child: [phone]  [place] - Adult: [phone]     Child: [phone]  [place]:  [phone]  List of all [facility] resources:   https://[place].gov/dhs/people-we-serve/adults/health-care/mental-health/resources/crisis-contacts.jsp    National Crisis Information:   Crisis Text Line: Text  [place]  to 980301  Suicide & Crisis Lifeline: 988  National Suicide Prevention Lifeline: 0-585-740-TALK (1-794.899.4023)       For online chat options, visit https://suicidepreventionlifeline.org/chat/  Poison Control Center: 3-280-142-7689  Trans Lifeline: 7-831-730-7161 - Hotline for transgender people of all ages  The Yovani Project: 0-641-048-1972 - Hotline for LGBT youth     For Non-Emergency Support:   Fast Tracker: Mental Health & Substance Use Disorder Resources -   https://www.[employer].org/

## 2024-07-15 ENCOUNTER — ANCILLARY PROCEDURE (OUTPATIENT)
Dept: MAMMOGRAPHY | Facility: CLINIC | Age: 64
End: 2024-07-15
Attending: FAMILY MEDICINE
Payer: COMMERCIAL

## 2024-07-15 DIAGNOSIS — Z12.31 VISIT FOR SCREENING MAMMOGRAM: ICD-10-CM

## 2024-07-15 PROCEDURE — 77063 BREAST TOMOSYNTHESIS BI: CPT

## 2024-07-15 PROCEDURE — 77063 BREAST TOMOSYNTHESIS BI: CPT | Mod: 26

## 2024-07-15 PROCEDURE — 77067 SCR MAMMO BI INCL CAD: CPT | Mod: 26

## 2024-07-24 ENCOUNTER — THERAPY VISIT (OUTPATIENT)
Dept: PHYSICAL THERAPY | Facility: CLINIC | Age: 64
End: 2024-07-24
Payer: COMMERCIAL

## 2024-07-24 DIAGNOSIS — M25.562 ACUTE PAIN OF LEFT KNEE: Primary | ICD-10-CM

## 2024-07-24 DIAGNOSIS — M25.561 CHRONIC PAIN OF RIGHT KNEE: ICD-10-CM

## 2024-07-24 DIAGNOSIS — G89.29 CHRONIC PAIN OF RIGHT KNEE: ICD-10-CM

## 2024-07-24 PROCEDURE — 97110 THERAPEUTIC EXERCISES: CPT | Mod: GP

## 2024-07-31 ENCOUNTER — THERAPY VISIT (OUTPATIENT)
Dept: PHYSICAL THERAPY | Facility: CLINIC | Age: 64
End: 2024-07-31
Payer: COMMERCIAL

## 2024-07-31 DIAGNOSIS — G89.29 CHRONIC PAIN OF RIGHT KNEE: ICD-10-CM

## 2024-07-31 DIAGNOSIS — M25.562 ACUTE PAIN OF LEFT KNEE: Primary | ICD-10-CM

## 2024-07-31 DIAGNOSIS — M25.561 CHRONIC PAIN OF RIGHT KNEE: ICD-10-CM

## 2024-07-31 PROCEDURE — 97110 THERAPEUTIC EXERCISES: CPT | Mod: GP

## 2024-09-04 ENCOUNTER — THERAPY VISIT (OUTPATIENT)
Dept: PHYSICAL THERAPY | Facility: CLINIC | Age: 64
End: 2024-09-04
Payer: COMMERCIAL

## 2024-09-04 DIAGNOSIS — G89.29 CHRONIC PAIN OF RIGHT KNEE: ICD-10-CM

## 2024-09-04 DIAGNOSIS — M25.561 CHRONIC PAIN OF RIGHT KNEE: ICD-10-CM

## 2024-09-04 DIAGNOSIS — M25.562 ACUTE PAIN OF LEFT KNEE: Primary | ICD-10-CM

## 2024-09-04 PROCEDURE — 97110 THERAPEUTIC EXERCISES: CPT | Mod: GP

## 2024-09-04 NOTE — PROGRESS NOTES
09/04/24 0500   Appointment Info   Signing clinician's name / credentials Tyrel Dietz, PT, DPT   Total/Authorized Visits E&T   Visits Used 7   Medical Diagnosis B primary OA   PT Tx Diagnosis Chronic knee pain of R knee, acute on chronic pain of L knee   Progress Note/Certification   Start of Care Date 06/05/24   Onset of illness/injury or Date of Surgery 06/05/24   Therapy Frequency 3 weekly visits then monthly visits   Predicted Duration 90 days   Certification date from 09/03/24   Certification date to 12/02/24   GOALS   PT Goals 2   PT Goal 1   Goal Identifier Stairs   Goal Description Pt will ascend and descend one flight of stairs without knee pain   Rationale to maximize safety and independence with performance of ADLs and functional tasks;to maximize safety and independence within the home;to maximize safety and independence within the community   Goal Progress progressing   Target Date 12/02/24   PT Goal 2   Goal Identifier bike   Goal Description Pt will be able to bike for leisure on stationary bike for 10 minutes without provocation of knee symptoms.   Rationale to maximize safety and independence with performance of ADLs and functional tasks;to maximize safety and independence within the community   Goal Progress progressing   Target Date 12/02/24   Subjective Report   Subjective Report Pt notes improvements in her knees - feels shortening her exercise list has been making things easier. She still notes difficulties with immediate propulsion on bike.   Treatment Interventions (PT)   Interventions Manual Therapy;Therapeutic Procedure/Exercise;Neuromuscular Re-education;Therapeutic Activity   Therapeutic Procedure/Exercise   Therapeutic Procedures: strength, endurance, ROM, flexibility minutes (47714) 40   Therapeutic Procedures Ther Proc 2   Ther Proc 1 Bike   Ther Proc 1 - Details lvl 2 x10 minutes   Ther Proc 2 Standing hip adduction with resistance band   Ther Proc 2 - Details GTB x10ea with  eccentric control   PTRx Ther Proc 1 Seated Knee Extension With Theraband   PTRx Ther Proc 1 - Details x20 with RTB - lean back and pain free motion   PTRx Ther Proc 2 Seated Hamstring Curl with Tubing   PTRx Ther Proc 2 - Details rev   PTRx Ther Proc 3 Knee Bends   PTRx Ther Proc 3 - Details 2x10   PTRx Ther Proc 4 Body Mechanics - [place]   PTRx Ther Proc 4 - Details x10 BW with chair - x10 with 10# without chair   PTRx Ther Proc 5 Hip AROM Standing Abduction   PTRx Ther Proc 5 - Details RTB x10ea   PTRx Ther Proc 6 Sitting Hip Adduction Squeeze   PTRx Ther Proc 6 - Details rev   PTRx Ther Proc 7 Hip Adduction With Theraband   PTRx Ther Proc 7 - Details rev   PTRx Ther Proc 8 Pilates - Spine Stretch Forward   PTRx Ther Proc 8 - Details No Notes   PTRx Ther Proc 9 Education Sheet General   PTRx Ther Proc 9 - Details NT   PTRx Ther Proc 10 Bridging   PTRx Ther Proc 10 - Details NT   Skilled Intervention Readjusting plan to address hamstrings and adductor pain first before working into quad strength (have had difficulties working on quad strength d/t limitations with these)   Patient Response/Progress Pt tolerated all quad based exercises better than last attempt a few visits ago.   PTRx Ther Proc 11 Hip Abduction Straight Leg Raise   PTRx Ther Proc 11 - Details NT   Neuromuscular Re-education   PTRx Neuro Re-ed 1 Standing Hamstring Curl Knee Flexion   PTRx Neuro Re-ed 1 - Details rev   PTRx Neuro Re-ed 2 Standing Hamstring Curl Knee Flexion   PTRx Neuro Re-ed 2 - Details rev   Skilled Intervention Edu on co-contaction and isolation of hamstring muscles after strain with hyperextension   Patient Response/Progress not today   Education   Learner/Method Patient   Education Comments Pt ammenabe to tx   Plan   Home program See PTRX   Plan for next session Bike, progress quad strength as able, TPR to hamstrings   Total Session Time   Timed Code Treatment Minutes 40   Total Treatment Time (sum of timed and untimed  services) 40       [facility]                                                                                   OUTPATIENT PHYSICAL THERAPY    PLAN OF TREATMENT FOR OUTPATIENT REHABILITATION   Patient's Last Name, First Name, Romi Richardson YOB: 1960   Provider's Name   [facility]   Medical Record No.  [id number]     Onset Date: 06/05/24  Start of Care Date: 06/05/24     Medical Diagnosis:  B primary OA      PT Treatment Diagnosis:  Chronic knee pain of R knee, acute on chronic pain of L knee Plan of Treatment  Frequency/Duration: 3 weekly visits then monthly visits/ 90 days    Certification date from 09/03/24 to 12/02/24         See note for plan of treatment details and functional goals     ROSAURA GARZA                         I CERTIFY THE NEED FOR THESE SERVICES FURNISHED UNDER        THIS PLAN OF TREATMENT AND WHILE UNDER MY CARE     (Physician attestation of this document indicates review and certification of the therapy plan).              Referring Provider:  Stanford Marroquin MD    Initial Assessment  See Epic Evaluation- Start of Care Date: 06/05/24      PLAN  Continue therapy per current plan of care.    Beginning/End Dates of Progress Note Reporting Period:    6/5/2024 to 09/04/2024    Referring Provider:  Stanford Marroquin MD

## 2024-09-18 ENCOUNTER — THERAPY VISIT (OUTPATIENT)
Dept: PHYSICAL THERAPY | Facility: CLINIC | Age: 64
End: 2024-09-18
Payer: COMMERCIAL

## 2024-09-18 DIAGNOSIS — M25.562 ACUTE PAIN OF LEFT KNEE: Primary | ICD-10-CM

## 2024-09-18 DIAGNOSIS — M25.561 CHRONIC PAIN OF RIGHT KNEE: ICD-10-CM

## 2024-09-18 DIAGNOSIS — G89.29 CHRONIC PAIN OF RIGHT KNEE: ICD-10-CM

## 2024-09-18 PROCEDURE — 97110 THERAPEUTIC EXERCISES: CPT | Mod: GP

## 2024-10-02 ENCOUNTER — THERAPY VISIT (OUTPATIENT)
Dept: PHYSICAL THERAPY | Facility: CLINIC | Age: 64
End: 2024-10-02
Payer: COMMERCIAL

## 2024-10-02 DIAGNOSIS — M25.561 CHRONIC PAIN OF RIGHT KNEE: ICD-10-CM

## 2024-10-02 DIAGNOSIS — M25.562 ACUTE PAIN OF LEFT KNEE: Primary | ICD-10-CM

## 2024-10-02 DIAGNOSIS — G89.29 CHRONIC PAIN OF RIGHT KNEE: ICD-10-CM

## 2024-10-02 PROCEDURE — 97530 THERAPEUTIC ACTIVITIES: CPT | Mod: GP

## 2024-10-02 PROCEDURE — 97110 THERAPEUTIC EXERCISES: CPT | Mod: GP

## 2024-10-08 DIAGNOSIS — F90.0 ADHD (ATTENTION DEFICIT HYPERACTIVITY DISORDER), INATTENTIVE TYPE: ICD-10-CM

## 2024-10-09 RX ORDER — ATOMOXETINE 40 MG/1
40 CAPSULE ORAL DAILY
Qty: 30 CAPSULE | Refills: 0 | Status: SHIPPED | OUTPATIENT
Start: 2024-10-09 | End: 2024-10-10

## 2024-10-09 NOTE — TELEPHONE ENCOUNTER
"Date of Last Office Visit: 7/11/2024  [facility] & [facility]      RTC: 12wks  No shows: 0  Cancellations: 0  Date of Next Office Visit:    10/10/2024 10:30 AM (30 min)  Colni   Arrive by: 10:15 AM   ADULT PSYCHIATRY RETURN    URPSY ([facility])   Kimberly Ulrich APRN CNP     ------------------------------    Medication requested:    atomoxetine (STRATTERA) 40 MG capsule 30 capsule 2 7/11/2024 -- No   Sig - Route: Take 1 capsule (40 mg) by mouth daily - Oral     ------------------------------  From last visit note:   \"ntinue lamotrigine 200mg daily, desvenlafaxine 50mg daily, atomoxetine 40mg daily \"       Refill decision: Medication refilled per protocol.                         "

## 2024-10-10 ENCOUNTER — VIRTUAL VISIT (OUTPATIENT)
Dept: PSYCHIATRY | Facility: CLINIC | Age: 64
End: 2024-10-10
Attending: NURSE PRACTITIONER
Payer: COMMERCIAL

## 2024-10-10 VITALS — BODY MASS INDEX: 35.51 KG/M2 | WEIGHT: 220 LBS

## 2024-10-10 DIAGNOSIS — F31.81 BIPOLAR II DISORDER (H): ICD-10-CM

## 2024-10-10 DIAGNOSIS — F90.0 ADHD (ATTENTION DEFICIT HYPERACTIVITY DISORDER), INATTENTIVE TYPE: ICD-10-CM

## 2024-10-10 PROCEDURE — 99214 OFFICE O/P EST MOD 30 MIN: CPT | Mod: 95 | Performed by: NURSE PRACTITIONER

## 2024-10-10 RX ORDER — LAMOTRIGINE 200 MG/1
200 TABLET ORAL DAILY
Qty: 30 TABLET | Refills: 2 | Status: SHIPPED | OUTPATIENT
Start: 2024-10-10

## 2024-10-10 RX ORDER — DESVENLAFAXINE 50 MG/1
50 TABLET, FILM COATED, EXTENDED RELEASE ORAL DAILY
Qty: 30 TABLET | Refills: 2 | Status: SHIPPED | OUTPATIENT
Start: 2024-10-10

## 2024-10-10 RX ORDER — ATOMOXETINE 40 MG/1
40 CAPSULE ORAL DAILY
Qty: 30 CAPSULE | Refills: 2 | Status: SHIPPED | OUTPATIENT
Start: 2024-10-10

## 2024-10-10 ASSESSMENT — PAIN SCALES - GENERAL: PAINLEVEL: NO PAIN (0)
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
## 2024-10-10 NOTE — PATIENT INSTRUCTIONS
**For crisis resources, please see the information at the end of this document**   Patient Education    Thank you for coming to the [facility] [place] [facility].     Lab Testing:  If you had lab testing today and your results are reassuring or normal they will be mailed to you or sent through [employer] within 7 days. If the lab tests need quick action we will call you with the results. The phone number we will call with results is # [phone]. If this is not the best number please call our clinic and change the number.     Medication Refills:  If you need any refills please call your pharmacy and they will contact us. Our fax number for refills is [phone].   Three business days of notice are needed for general medication refill requests.   Five business days of notice are needed for controlled substance refill requests.   If you need to change to a different pharmacy, please contact the new pharmacy directly. The new pharmacy will help you get your medications transferred.     Contact Us:  Please call [phone] during business hours (8-5:00 M-F).   If you have medication related questions after clinic hours, or on the weekend, please call [phone].     Financial Assistance [phone]   Medical Records [phone]       MENTAL HEALTH CRISIS RESOURCES:  For a emergency help, please call 911 or go to the nearest Emergency Department.     Emergency Walk-In Options:   EmPATH Unit @ [place] ([place]): [phone] - Specialized mental health emergency area designed to be calming  [facility] ([place]): [phone]  [facility] ([place]): [phone]  [facility]): [phone]    [facility] Crisis Information:   [place]: [phone]  [place]: [phone]  [place] ([place]) - Adult: [phone]     Child: [phone]  [place] - Adult: [phone]     Child: [phone]  [place]:  [phone]  List of all [facility] resources:   https://[place].gov/dhs/people-we-serve/adults/health-care/mental-health/resources/crisis-contacts.jsp    National Crisis Information:   Crisis Text Line: Text  [place]  to 667861  Suicide & Crisis Lifeline: 988  National Suicide Prevention Lifeline: 1-054-765-TALK (1-985.310.9093)       For online chat options, visit https://suicidepreventionlifeline.org/chat/  Poison Control Center: 3-646-585-5406  Trans Lifeline: 2-270-223-5632 - Hotline for transgender people of all ages  The [employer] Project: 2-155-731-5608 - Hotline for LGBT youth     For Non-Emergency Support:   Fast Tracker: Mental Health & Substance Use Disorder Resources -   https://www.[employer].org/

## 2024-10-10 NOTE — NURSING NOTE
Current patient location: [address] [address]    Is the patient currently in the state of [place]? YES    Visit mode:VIDEO    If the visit is dropped, the patient can be reconnected by: VIDEO VISIT: Text to cell phone:   Telephone Information:   Mobile [phone]       Will anyone else be joining the visit? NO  (If patient encounters technical issues they should call [phone]    Are changes needed to the allergy or medication list? No    Are refills needed on medications prescribed by this physician? NO    Rooming Documentation:  Unable to complete questionnaire(s) due to time    Reason for visit: Video Visit (Recheck)    Agata TALBOT

## 2024-10-30 ENCOUNTER — THERAPY VISIT (OUTPATIENT)
Dept: PHYSICAL THERAPY | Facility: CLINIC | Age: 64
End: 2024-10-30
Payer: COMMERCIAL

## 2024-10-30 DIAGNOSIS — G89.29 CHRONIC PAIN OF RIGHT KNEE: ICD-10-CM

## 2024-10-30 DIAGNOSIS — M25.562 ACUTE PAIN OF LEFT KNEE: Primary | ICD-10-CM

## 2024-10-30 DIAGNOSIS — M25.561 CHRONIC PAIN OF RIGHT KNEE: ICD-10-CM

## 2024-10-30 PROCEDURE — 97110 THERAPEUTIC EXERCISES: CPT | Mod: GP

## 2024-10-30 PROCEDURE — 97530 THERAPEUTIC ACTIVITIES: CPT | Mod: GP

## 2024-11-27 ENCOUNTER — ANCILLARY PROCEDURE (OUTPATIENT)
Dept: BONE DENSITY | Facility: CLINIC | Age: 64
End: 2024-11-27
Attending: FAMILY MEDICINE
Payer: COMMERCIAL

## 2024-11-27 DIAGNOSIS — Z78.0 POSTMENOPAUSAL STATUS: ICD-10-CM

## 2024-11-27 PROCEDURE — 77080 DXA BONE DENSITY AXIAL: CPT | Performed by: INTERNAL MEDICINE

## 2024-12-13 DIAGNOSIS — F90.0 ADHD (ATTENTION DEFICIT HYPERACTIVITY DISORDER), INATTENTIVE TYPE: ICD-10-CM

## 2024-12-16 RX ORDER — ATOMOXETINE 40 MG/1
40 CAPSULE ORAL DAILY
Qty: 90 CAPSULE | OUTPATIENT
Start: 2024-12-16

## 2024-12-16 RX ORDER — DESVENLAFAXINE 50 MG/1
50 TABLET, FILM COATED, EXTENDED RELEASE ORAL DAILY
Qty: 90 TABLET | OUTPATIENT
Start: 2024-12-16

## 2024-12-17 ENCOUNTER — TELEPHONE (OUTPATIENT)
Dept: PSYCHIATRY | Facility: CLINIC | Age: 64
End: 2024-12-17
Payer: COMMERCIAL

## 2024-12-17 NOTE — TELEPHONE ENCOUNTER
This writer spoke to Marlena, pharmacist at [facility] who shared that patient picked up a 90 day supply of medications: Strattera 40 mg  on  11/6/24 and  Pristiq 50 mg on 10/14/24.    Writer called the patient and patient shared she is out of Strattera as of today and running low on Pristiq. Patient believes she only received a 30 day supply of both medications when she picked them up. This writer asked patient to check the prescription bottles when she returns home and call the clinic to verify the amount dispensed. Writer updated RN who states she will call the patient regarding this issue.    Juanita Dooley LPN

## 2024-12-17 NOTE — TELEPHONE ENCOUNTER
Reached out to patient to connect regarding miscount of Strattera and Pristiq. Patient is currently on her way home from work. She confirmed that her  did find bottles of Strattera. She was unsure about Pristiq but is confident that she has more at home. Patient mentioned she has had a lot on her plate lately and did not recall picking up three bottles of the same medications. She agreed to call back if she continues to experiencing miscounts with meds.     No further action needed by this writer

## 2024-12-17 NOTE — TELEPHONE ENCOUNTER
"[facility] Call Center    Phone Message    May a detailed message be left on voicemail: yes     Reason for Call: Medication Refill Request    Has the patient contacted the pharmacy for the refill? Yes   Name of medication being requested: Strattera & Pristiq  Provider who prescribed the medication: Kimberly Ulrich  Pharmacy: [facility] [id number] [place], [place] - [address]   Date medication is needed: ASAP, patient is out of medications    Patient was told by pharmacy that they did not have script and they \"couldn't deal with this right now\" and didn't help her. Patient would like to speak to a nurse to help with medication refill and clarify    Action Taken: Message routed to:  Other: P PSYCHIATRY NURSE-P    Travel Screening: Not Applicable     Date of Service:                                                                     "

## 2024-12-18 ENCOUNTER — TRANSCRIBE ORDERS (OUTPATIENT)
Dept: OTHER | Age: 64
End: 2024-12-18

## 2024-12-18 DIAGNOSIS — R29.898 OTHER SYMPTOMS AND SIGNS INVOLVING THE MUSCULOSKELETAL SYSTEM: ICD-10-CM

## 2024-12-18 DIAGNOSIS — R29.898 WEAKNESS OF RIGHT HAND: Primary | ICD-10-CM

## 2025-01-02 ENCOUNTER — THERAPY VISIT (OUTPATIENT)
Dept: OCCUPATIONAL THERAPY | Facility: CLINIC | Age: 65
End: 2025-01-02
Payer: COMMERCIAL
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
## 2025-01-03 PROBLEM — M79.641 HAND PAIN, RIGHT: Status: ACTIVE | Noted: 2025-01-03

## 2025-01-09 ENCOUNTER — VIRTUAL VISIT (OUTPATIENT)
Dept: PSYCHIATRY | Facility: CLINIC | Age: 65
End: 2025-01-09
Attending: NURSE PRACTITIONER
Payer: COMMERCIAL

## 2025-01-09 VITALS — HEIGHT: 66 IN | WEIGHT: 215 LBS | BODY MASS INDEX: 34.55 KG/M2

## 2025-01-09 DIAGNOSIS — F90.0 ADHD (ATTENTION DEFICIT HYPERACTIVITY DISORDER), INATTENTIVE TYPE: ICD-10-CM

## 2025-01-09 DIAGNOSIS — F31.81 BIPOLAR II DISORDER (H): ICD-10-CM

## 2025-01-09 RX ORDER — ATOMOXETINE 40 MG/1
40 CAPSULE ORAL DAILY
Qty: 30 CAPSULE | Refills: 2 | Status: SHIPPED | OUTPATIENT
Start: 2025-01-09

## 2025-01-09 RX ORDER — DESVENLAFAXINE 50 MG/1
50 TABLET, FILM COATED, EXTENDED RELEASE ORAL DAILY
Qty: 30 TABLET | Refills: 2 | Status: SHIPPED | OUTPATIENT
Start: 2025-01-09

## 2025-01-09 RX ORDER — LAMOTRIGINE 200 MG/1
200 TABLET ORAL DAILY
Qty: 30 TABLET | Refills: 2 | Status: SHIPPED | OUTPATIENT
Start: 2025-01-09

## 2025-01-09 ASSESSMENT — ANXIETY QUESTIONNAIRES
1. FEELING NERVOUS, ANXIOUS, OR ON EDGE: NEARLY EVERY DAY
7. FEELING AFRAID AS IF SOMETHING AWFUL MIGHT HAPPEN: NEARLY EVERY DAY
2. NOT BEING ABLE TO STOP OR CONTROL WORRYING: NEARLY EVERY DAY
GAD7 TOTAL SCORE: 17
7. FEELING AFRAID AS IF SOMETHING AWFUL MIGHT HAPPEN: NEARLY EVERY DAY
IF YOU CHECKED OFF ANY PROBLEMS ON THIS QUESTIONNAIRE, HOW DIFFICULT HAVE THESE PROBLEMS MADE IT FOR YOU TO DO YOUR WORK, TAKE CARE OF THINGS AT HOME, OR GET ALONG WITH OTHER PEOPLE: VERY DIFFICULT
GAD7 TOTAL SCORE: 17
5. BEING SO RESTLESS THAT IT IS HARD TO SIT STILL: SEVERAL DAYS
4. TROUBLE RELAXING: MORE THAN HALF THE DAYS
GAD7 TOTAL SCORE: 17
8. IF YOU CHECKED OFF ANY PROBLEMS, HOW DIFFICULT HAVE THESE MADE IT FOR YOU TO DO YOUR WORK, TAKE CARE OF THINGS AT HOME, OR GET ALONG WITH OTHER PEOPLE?: VERY DIFFICULT
6. BECOMING EASILY ANNOYED OR IRRITABLE: MORE THAN HALF THE DAYS
3. WORRYING TOO MUCH ABOUT DIFFERENT THINGS: NEARLY EVERY DAY

## 2025-01-09 ASSESSMENT — PAIN SCALES - GENERAL: PAINLEVEL_OUTOF10: NO PAIN (0)
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
## 2025-01-09 NOTE — NURSING NOTE
Current patient location: Patient declined to provide     Is the patient currently in the state of [place]? YES    Visit mode: VIDEO    If the visit is dropped, the patient can be reconnected by:VIDEO VISIT: Text to cell phone:   Telephone Information:   Mobile [phone]       Will anyone else be joining the visit? NO  (If patient encounters technical issues they should call [phone] :[phone])    Are changes needed to the allergy or medication list? No    Are refills needed on medications prescribed by this physician? NO    Rooming Documentation:  Patient will complete questionnaire(s) in [place] [employer]    Reason for visit: RECHECK (F/U)    Agata BRUNERF

## 2025-01-09 NOTE — PATIENT INSTRUCTIONS
**For crisis resources, please see the information at the end of this document**   Patient Education    Thank you for coming to the [facility] [place] [facility].     Lab Testing:  If you had lab testing today and your results are reassuring or normal they will be mailed to you or sent through [employer] within 7 days. If the lab tests need quick action we will call you with the results. The phone number we will call with results is # [phone]. If this is not the best number please call our clinic and change the number.     Medication Refills:  If you need any refills please call your pharmacy and they will contact us. Our fax number for refills is [phone].   Three business days of notice are needed for general medication refill requests.   Five business days of notice are needed for controlled substance refill requests.   If you need to change to a different pharmacy, please contact the new pharmacy directly. The new pharmacy will help you get your medications transferred.     Contact Us:  Please call [phone] during business hours (8-5:00 M-F).   If you have medication related questions after clinic hours, or on the weekend, please call [phone].     Financial Assistance [phone]   Medical Records [phone]       MENTAL HEALTH CRISIS RESOURCES:  For a emergency help, please call 911 or go to the nearest Emergency Department.     Emergency Walk-In Options:   EmPATH Unit @ [facility] ([place]): [phone] - Specialized mental health emergency area designed to be calming  [facility] ([place]): [phone]  [facility] ([place]): [phone]  [facility]): [phone]    [facility] Crisis Information:   [place]: [phone]  [place]: [phone]  [place] ([place]) - Adult: [phone]     Child: [phone]  [place] - Adult: [phone]     Child: [phone]  [place]:  [phone]  List of all [facility] resources:   https://[place].gov/dhs/people-we-serve/adults/health-care/mental-health/resources/crisis-contacts.jsp    National Crisis Information:   Crisis Text Line: Text  [place]  to 354440  Suicide & Crisis Lifeline: 988  National Suicide Prevention Lifeline: 6-766-916-TALK (1-113.460.6376)       For online chat options, visit https://suicidepreventionlifeline.org/chat/  Poison Control Center: 7-486-085-3961  Trans Lifeline: 7-037-336-8915 - Hotline for transgender people of all ages  The Yovani Project: 0-998-852-6325 - Hotline for LGBT youth     For Non-Emergency Support:   Fast Tracker: Mental Health & Substance Use Disorder Resources -   https://www.Epy.iockMedopadn.org/

## 2025-01-23 ENCOUNTER — THERAPY VISIT (OUTPATIENT)
Dept: OCCUPATIONAL THERAPY | Facility: CLINIC | Age: 65
End: 2025-01-23
Payer: COMMERCIAL

## 2025-01-23 DIAGNOSIS — M79.641 HAND PAIN, RIGHT: Primary | ICD-10-CM

## 2025-01-27 ENCOUNTER — TELEPHONE (OUTPATIENT)
Dept: PSYCHIATRY | Facility: CLINIC | Age: 65
End: 2025-01-27

## 2025-01-27 NOTE — TELEPHONE ENCOUNTER
Vaccine administration information was received from [facility] pharmacy.   1.  Vaccine: Influenza  2.  Date received: 1/24/25  3.  Dose: 0.50 mL  4.  Route: IM  5.  Location: LD  6.  : SANOFI-PASTEUR  7.  Lot #: [id number]  8.  Exp: 6/30/2025    Raegan Pimentel, LIZ                Vaccine administration information was received from [facility] pharmacy.   1.  Vaccine: COVID-19  2.  Date received:  1/24/25  3.  Dose: 0.50 mL  4.  Route: IM  5.  Location:   6.  : Moderna  7.  Lot #: [id number]  8.  Exp: 6/13/2025    Raegan Pimentel, EMT

## 2025-02-06 ENCOUNTER — THERAPY VISIT (OUTPATIENT)
Dept: OCCUPATIONAL THERAPY | Facility: CLINIC | Age: 65
End: 2025-02-06
Payer: COMMERCIAL

## 2025-02-06 DIAGNOSIS — M79.641 HAND PAIN, RIGHT: Primary | ICD-10-CM

## 2025-02-13 ENCOUNTER — THERAPY VISIT (OUTPATIENT)
Dept: OCCUPATIONAL THERAPY | Facility: CLINIC | Age: 65
End: 2025-02-13
Payer: COMMERCIAL

## 2025-02-13 DIAGNOSIS — M79.641 HAND PAIN, RIGHT: Primary | ICD-10-CM

## 2025-03-12 ENCOUNTER — THERAPY VISIT (OUTPATIENT)
Dept: OCCUPATIONAL THERAPY | Facility: CLINIC | Age: 65
End: 2025-03-12
Payer: COMMERCIAL

## 2025-03-12 DIAGNOSIS — M79.641 HAND PAIN, RIGHT: Primary | ICD-10-CM

## 2025-03-12 PROCEDURE — 97535 SELF CARE MNGMENT TRAINING: CPT | Mod: GO | Performed by: OCCUPATIONAL THERAPIST

## 2025-03-12 NOTE — PROGRESS NOTES
HAND THERAPY NOTE  Additional information related to this visit is noted below.  Please refer to the daily flowsheet for treatment today, total treatment time and time spent performing 1:1 timed codes.      Subjective:  It's like it fatigues really quick (R lateral elbow/ext wad area).  Pressing, pushing through the forearm/hand can sometimes really hurt.  The R elbow/lateral forearm has had some issues with fatiguing over the years.    Objective:    Paresthesias: at times, unsure if there has been some since last visit (1 month ago)    RESISTED TESTING  Pain Report:  - none    + mild    ++ moderate    +++ severe    R   Wrist Ext Elbow at side +   Wrist Ext with RD Elbow at side +   EDC with Elbow at side +   Long Finger Test ++     PALPATION:  Pain Report:  - none    + mild    ++ moderate    +++ severe    R   Lateral Epicondyle +, all portions   ECRL origin / supracondylar ridge -   Extensor wad ++   Just posterior to the lateral epicondyle -   Specific area of the lateral epicondyle:  Superior: +  Anterior:+  Inferior:+    Palpation: Radial Nerve - potential neural compression areas  Pain Report:  - none    + mild    ++ moderate    +++ severe   RADIAL NERVE: (C5-T1)   R   Lateral humerus at distal 1/3  (at intermuscular septa exit) +   Supracondylar ridge of humerus -   Anterior to the radiocapitellar joint (midportion of the joint)   +   POSTERIOR INTEROSSEOUS NERVE      Proximal Supinator edge (arcade of [place] - slightly distal/ lateral to radial head) +   Distal Supinator edge   (lateral /posterior aspect of the proximal/mid radius) +   DORSAL RADIAL SENSORY NERVE   -

## 2025-03-17 ENCOUNTER — OFFICE VISIT (OUTPATIENT)
Dept: URGENT CARE | Facility: URGENT CARE | Age: 65
End: 2025-03-17
Payer: COMMERCIAL

## 2025-03-17 ENCOUNTER — NURSE TRIAGE (OUTPATIENT)
Dept: FAMILY MEDICINE | Facility: CLINIC | Age: 65
End: 2025-03-17
Payer: COMMERCIAL

## 2025-03-17 VITALS
BODY MASS INDEX: 34.7 KG/M2 | OXYGEN SATURATION: 97 % | TEMPERATURE: 97.4 F | RESPIRATION RATE: 18 BRPM | DIASTOLIC BLOOD PRESSURE: 90 MMHG | HEART RATE: 73 BPM | HEIGHT: 66 IN | SYSTOLIC BLOOD PRESSURE: 143 MMHG

## 2025-03-17 DIAGNOSIS — R42 DIZZINESS: Primary | ICD-10-CM

## 2025-03-17 DIAGNOSIS — H81.10 BENIGN PAROXYSMAL POSITIONAL VERTIGO, UNSPECIFIED LATERALITY: ICD-10-CM

## 2025-03-17 LAB
ANION GAP SERPL CALCULATED.3IONS-SCNC: 14 MMOL/L (ref 3–14)
BUN SERPL-MCNC: 12 MG/DL (ref 7–30)
CALCIUM SERPL-MCNC: 10.3 MG/DL (ref 8.5–10.1)
CHLORIDE BLD-SCNC: 104 MMOL/L (ref 94–109)
CO2 SERPL-SCNC: 28 MMOL/L (ref 20–32)
CREAT SERPL-MCNC: 0.8 MG/DL (ref 0.52–1.04)
EGFRCR SERPLBLD CKD-EPI 2021: 80 ML/MIN/1.73M2
ERYTHROCYTE [DISTWIDTH] IN BLOOD BY AUTOMATED COUNT: 12.5 % (ref 10–15)
GLUCOSE BLD-MCNC: 102 MG/DL (ref 70–99)
HCT VFR BLD AUTO: 42.5 % (ref 35–47)
HGB BLD-MCNC: 14.2 G/DL (ref 11.7–15.7)
MCH RBC QN AUTO: 28.4 PG (ref 26.5–33)
MCHC RBC AUTO-ENTMCNC: 33.4 G/DL (ref 31.5–36.5)
MCV RBC AUTO: 85 FL (ref 78–100)
PLATELET # BLD AUTO: 291 10E3/UL (ref 150–450)
POTASSIUM BLD-SCNC: 4.4 MMOL/L (ref 3.4–5.3)
RBC # BLD AUTO: 5 10E6/UL (ref 3.8–5.2)
SODIUM SERPL-SCNC: 146 MMOL/L (ref 135–145)
WBC # BLD AUTO: 7.9 10E3/UL (ref 4–11)

## 2025-03-17 PROCEDURE — 3075F SYST BP GE 130 - 139MM HG: CPT | Performed by: PHYSICIAN ASSISTANT

## 2025-03-17 PROCEDURE — 93000 ELECTROCARDIOGRAM COMPLETE: CPT | Performed by: PHYSICIAN ASSISTANT

## 2025-03-17 PROCEDURE — 36415 COLL VENOUS BLD VENIPUNCTURE: CPT | Performed by: PHYSICIAN ASSISTANT

## 2025-03-17 PROCEDURE — 3079F DIAST BP 80-89 MM HG: CPT | Performed by: PHYSICIAN ASSISTANT

## 2025-03-17 PROCEDURE — 99204 OFFICE O/P NEW MOD 45 MIN: CPT | Performed by: PHYSICIAN ASSISTANT

## 2025-03-17 PROCEDURE — 85027 COMPLETE CBC AUTOMATED: CPT | Performed by: PHYSICIAN ASSISTANT

## 2025-03-17 PROCEDURE — 80048 BASIC METABOLIC PNL TOTAL CA: CPT | Performed by: PHYSICIAN ASSISTANT

## 2025-03-17 RX ORDER — MECLIZINE HYDROCHLORIDE 25 MG/1
25 TABLET ORAL 3 TIMES DAILY PRN
Qty: 30 TABLET | Refills: 0 | Status: SHIPPED | OUTPATIENT
Start: 2025-03-17 | End: 2025-03-27

## 2025-03-17 NOTE — TELEPHONE ENCOUNTER
Nurse Triage SBAR    Is this a 2nd Level Triage? YES, LICENSED PRACTITIONER REVIEW IS REQUIRED    Situation:  Received call via red line.    Patient calling with dizziness and HTN. Woke up around 10am with dizziness and nausea. Checked /111 at 12pm. Patient reports having vertigo in past but states it feels different this time. Doesn't want to move head. Feels dizzy when not moving head. Does not feel like room is tilting or spinning. Does feel light-headed. Denies SOB, CP, fevers. Denies blurry/double vision. Denies numbness/tingling.     Background: takes amlodipine 5mg    Assessment: A/O. Speaking clearly and in full sentences. Family with patient at time of call.    Protocol Recommended Disposition:   Call ADS/Go to ED/UCC Now (Or To Office with PCP Approval)    Recommendation: Writer advised ER/UC. Patient agrees and states they plan on going to [facility]. Daughter then on phone states they were offered to be put on a schedule for UC. Writer not sure how to do this offered to transfer to central scheduling. Patient/daughter hung up prior to transfer.    Connie Art RN on 3/17/2025 at 1:15 PM      Reason for Disposition   Spinning or tilting sensation (vertigo) present now and one or more stroke risk factors (i.e., hypertension, diabetes mellitus, prior stroke/TIA, heart attack, age over 60) (Exception: Prior physician evaluation for this AND no different/worse than usual.)   Systolic BP >= 160 OR Diastolic >= 100, and any cardiac or neurologic symptoms (e.g., chest pain, difficulty breathing, unsteady gait, blurred vision)     dizziness    Additional Information   Negative: SEVERE difficulty breathing (e.g., struggling for each breath, speaks in single words)   Negative: Shock suspected (e.g., cold/pale/clammy skin, too weak to stand, low BP, rapid pulse)   Negative: Difficult to awaken or acting confused (e.g., disoriented, slurred speech)   Negative: Fainted, and still feels dizzy  "afterwards   Negative: Overdose (accidental or intentional) of medications   Negative: New neurologic deficit that is present now: * Weakness of the face, arm, or leg on one side of the body * Numbness of the face, arm, or leg on one side of the body * Loss of speech or garbled speech   Negative: Heart beating < 50 beats per minute OR > 140 beats per minute   Negative: Sounds like a life-threatening emergency to the triager   Negative: Chest pain   Negative: Rectal bleeding, bloody stool, or tarry-black stool   Negative: Vomiting is main symptom   Negative: Diarrhea is main symptom   Negative: Headache is main symptom   Negative: Heat exhaustion suspected (i.e., dehydration from heat exposure)   Negative: Patient states that they are having an anxiety or panic attack   Negative: Dizziness from low blood sugar (i.e., < 60 mg/dl or 3.5 mmol/l)   Negative: SEVERE headache or neck pain   Negative: SEVERE dizziness (e.g., unable to stand, requires support to walk, feels like passing out now)   Negative: Sounds like a life-threatening emergency to the triager   Negative: Pregnant > 20 weeks or postpartum (< 6 weeks after delivery) and new hand or face swelling   Negative: Pregnant > 20 weeks and BP > 140/90    Answer Assessment - Initial Assessment Questions  1. DESCRIPTION: \"Describe your dizziness.\"      \"When I turn my head I have to go really slow\". \"My head feels weird\"  2. LIGHTHEADED: \"Do you feel lightheaded?\" (e.g., somewhat faint, woozy, weak upon standing)      \"A little bit\"  3. VERTIGO: \"Do you feel like either you or the room is spinning or tilting?\" (i.e. vertigo)      no  4. SEVERITY: \"How bad is it?\"  \"Do you feel like you are going to faint?\" \"Can you stand and walk?\"    - MILD: Feels slightly dizzy, but walking normally.    - MODERATE: Feels unsteady when walking, but not falling; interferes with normal activities (e.g., school, work).    - SEVERE: Unable to walk without falling, or requires assistance " "to walk without falling; feels like passing out now.       mild  5. ONSET:  \"When did the dizziness begin?\"      This morning  6. AGGRAVATING FACTORS: \"Does anything make it worse?\" (e.g., standing, change in head position)      Moving head  7. HEART RATE: \"Can you tell me your heart rate?\" \"How many beats in 15 seconds?\"  (Note: not all patients can do this)          8. CAUSE: \"What do you think is causing the dizziness?\"      Unsure BP, vertigo  9. RECURRENT SYMPTOM: \"Have you had dizziness before?\" If Yes, ask: \"When was the last time?\" \"What happened that time?\"      \"I can't remember\" \"once a year\"  10. OTHER SYMPTOMS: \"Do you have any other symptoms?\" (e.g., fever, chest pain, vomiting, diarrhea, bleeding)        Stomach ache  11. PREGNANCY: \"Is there any chance you are pregnant?\" \"When was your last menstrual period?\"        no    Protocols used: Dizziness-A-OH, High Blood Pressure-A-OH    "

## 2025-03-17 NOTE — PROGRESS NOTES
"SUBJECTIVE:   Romi Cortez is a 65 year old female who complains of new onset positional vertigo for 1 days. Has not had this in the past. The patient denies any other symptoms of neurological impairment or TIA's; no diplopia, dysphasia, or unilateral disturbance of motor or sensory function. No Marked headaches. No hearing loss or tinnitus, nor head injury. No palpitations or syncope. Healthy in the past.    OBJECTIVE:  BP (!) 143/90   Pulse 73   Temp 97.4  F (36.3  C) (Temporal)   Resp 18   Ht 1.676 m (5' 6\")   SpO2 97%   BMI 34.70 kg/m    Appears well, in no apparent distress. Vitals normal. Ears normal. Neck supple. Cranial nerves are normal. . HEIDI. EOM's intact. DTR's normal and symmetric. Mental status normal. Gait and station normal. Romberg negative. Cerebellar function is normal. No internuclear ophthalmoplegia. Pulse regular. Rapid changes in position during the exam do precipitate brief dizziness with  nystagmus.      Results for orders placed or performed in visit on 03/17/25   CBC with platelets     Status: Normal   Result Value Ref Range    WBC Count 7.9 4.0 - 11.0 10e3/uL    RBC Count 5.00 3.80 - 5.20 10e6/uL    Hemoglobin 14.2 11.7 - 15.7 g/dL    Hematocrit 42.5 35.0 - 47.0 %    MCV 85 78 - 100 fL    MCH 28.4 26.5 - 33.0 pg    MCHC 33.4 31.5 - 36.5 g/dL    RDW 12.5 10.0 - 15.0 %    Platelet Count 291 150 - 450 10e3/uL       EKG: Sinus    Orthostatics WNL    ASSESSMENT:  (R42) Dizziness  (primary encounter diagnosis)  (H81.10) Benign paroxysmal positional vertigo, unspecified laterality  Plan: meclizine (ANTIVERT) 25 MG tablet, Physical         Therapy  Referral, Orthostatic blood         pressure and pulse, EKG 12-lead complete w/read        - Clinics, CBC with platelets, Basic metabolic         panel  (Ca, Cl, CO2, Creat, Gluc, K, Na, BUN)        PLAN:  The patient is reassured that these symptoms do not appear to represent a serious or threatening condition. This is " generally a self-limited temporary but uncomfortable situation. Rest, avoid potentially dangerous activities (such as driving or working with machinery or at heights), use OTC Meclizine prn. Asked to call if develops other symptoms, such as alterations of speech, swallowing, vision, motor or sensory systems, or if dizziness persists or worsens.

## 2025-03-21 ENCOUNTER — THERAPY VISIT (OUTPATIENT)
Dept: PHYSICAL THERAPY | Facility: CLINIC | Age: 65
End: 2025-03-21
Attending: FAMILY MEDICINE
Payer: COMMERCIAL

## 2025-03-21 DIAGNOSIS — R42 DIZZINESS: ICD-10-CM

## 2025-03-21 PROCEDURE — 95992 CANALITH REPOSITIONING PROC: CPT | Mod: GP | Performed by: PHYSICAL THERAPIST

## 2025-03-21 PROCEDURE — 97161 PT EVAL LOW COMPLEX 20 MIN: CPT | Mod: GP | Performed by: PHYSICAL THERAPIST

## 2025-03-21 NOTE — PROGRESS NOTES
PHYSICAL THERAPY EVALUATION  Type of Visit: Evaluation        Fall Risk Screen:  Fall screen completed by: PT  Have you fallen 2 or more times in the past year?: No  Have you fallen and had an injury in the past year?: No  Is patient a fall risk?: No    Subjective         Presenting condition or subjective complaint: vertigo - Happened when I woke up from my sleep. Feels like my head is full all the time. Headache. When I change my neck position. Denies recent sickness/illness. Day before this happened I was super busy. Was trying to get ready. Turning head to the right is worse. Has been taking Meclizine.   Date of onset: 03/17/25    Relevant medical history: Depression; Dizziness; High blood pressure; Menopause; Osteoporosis; Pregnant or breastfeeding; Severe dizziness; Sleep disorder like apnea   Dates & types of surgery: d&c 1996   appendectomy around 2009  shoulder repair around 2022    Prior diagnostic imaging/testing results:       Prior therapy history for the same diagnosis, illness or injury: No      Prior Level of Function  Transfers: Independent  Ambulation: Independent  ADL: Independent    Living Environment  Social support: With a significant other or spouse   Type of home: House   Stairs to enter the home: Yes 5 Is there a railing: Yes     Ramp: No   Stairs inside the home: Yes 15 Is there a railing: Yes     Help at home:    Equipment owned:       Employment: Not Applicable    Hobbies/Interests: biking reading art music    Patient goals for therapy: not be dizzy  stand and walk without being dizzy and not be nauseous    Pain assessment:  Minor neck pain but okay.      Objective      Cognitive Status Examination  Orientation: Oriented to person, place and time   Level of Consciousness: Alert  Follows Commands and Answers Questions: 100% of the time  Personal Safety and Judgement: Intact  Memory: Intact    OBSERVATION: keeps head still, fixed in forward position   INTEGUMENTARY: Intact  POSTURE:  WNL  PALPATION: N/A  RANGE OF MOTION:  WFLs   STRENGTH:  WFLs - not formally assessed     BED MOBILITY: Independent    TRANSFERS: Independent    GAIT:   Level of [place]: Independent  Assistive Device(s): None  Gait Deviations:  slow gait speed, head in fixed forward position   Gait Distance: Not tested today   Stairs: Not tested, reports going okay at home.     BALANCE:  Slight impairment  in dynamic and static balance.     SPECIAL TESTS  10 Meter Walk Test (Comfortable)  8.7 sec (7 meters) - 0.8 m/s     Modified CTSIB Conditions (sec) Cond 1: 30 seconds   Cond 2: 30 seconds   Cond 4: 30 seconds  Cond 5 : 30 seconds - moderate sway: listing to the right       SENSATION:  Baseline tingling in L arm - going to PT for this. No new sensory changes reported.        VESTIBULAR EVALUATION  ADDITIONAL HISTORY:  Description of symptoms: Constant dizziness; Off balance; Attacks of dizziness  Dizzy attacks:   Start: monday  5days ago   Last attack: this morning   Frequency of occurrences: pretty constant   need to keep head steady   Length of attack: a few srconds if i hold very still  Difficulty hearing:  No  Noise in ears? No  - few times.   Alleviates symptoms:    Worsens symptoms: moving head   changing from lying to sitting etc  Activities that bring on symptoms: Riding in a car; Bending over; Other  turning or lifting head    changing body level -lying to sitting to standing    Pertinent visual history: Denies double vision, blurring of vision.   Pertinent history of current vestibular problem: Motion sickness, Prior concussion(s)  DHI: Total Score: (Patient-Rptd) 64    Cervicogenic Screen    Neck ROM Normal     Oculomotor Screen    Ocular ROM Normal    Smooth Pursuit Possible challenge up/down    Saccades Up/down causes nausea    VOR Abnormal - Increase in symptoms    VOR Cancellation Not tested today    Head Impulse Test Not tested today    Convergence Testing Abnormal - not wearing glasses        Infrared Goggle  Exam Vestibular Suppressant in Last 24 Hours? Yes  Exam Completed With: Infrared goggles   Spontaneous Nystagmus Negative   Gaze Evoked Nystagmus Negative   Head Shake Horizontal Nystagmus Not tested today    Positional Testing    Supine Head-Hanging Test     Left Right   Ovid-Hallpike Upbeating L torsional, Very mild, 1/2 torsion, patient symptomatic moderate dizziness, likely suppressed from Meclizine in AM Negative   [facility] Supine Roll Test Negative Negative   BPPV Canal(s): L Posterior  BPPV Type: Canalithasis    Assessment & Plan   CLINICAL IMPRESSIONS  Medical Diagnosis: Vertigo, BPPV    Treatment Diagnosis: BPPV   Impression/Assessment: Patient is a 65 year old female with dizziness complaints.  The following significant findings have been identified: Pain, Impaired balance, Impaired gait, Instability, and Dizziness. These impairments interfere with their ability to perform recreational activities, household mobility, community mobility, and caregiver for   as compared to previous level of function.     Clinical Decision Making (Complexity):  Clinical Presentation: Stable/Uncomplicated  Clinical Presentation Rationale: based on medical and personal factors listed in PT evaluation  Clinical Decision Making (Complexity): Low complexity    PLAN OF CARE  Treatment Interventions:  Modalities:  per therapist discretion  Interventions: Gait Training, Manual Therapy, Neuromuscular Re-education, Therapeutic Activity, Therapeutic Exercise, Self-Care/Home Management, Canalith Repositioning    Long Term Goals     PT Goal 1  Goal Identifier: DHI  Goal Description: Patient to improve score on DHI from 64 to 46 or less as a result of a significant reduction in dizziness in order to improve all functional tasks.  Target Date: 06/18/25  PT Goal 2  Goal Identifier: Static balance  Goal Description: Patient to perform condition 5 of mCTSIB for 30 sec with minimal sway only and no listing to the right as a result of improved  vestibular integration for balance assist.  Target Date: 06/18/25  PT Goal 3  Goal Identifier: Gait speed  Goal Description: Patient to ambulate at 1.0 m/s and perform head turns with no imbalance in order to improve household mobility.  Target Date: 06/18/25      Frequency of Treatment: 1x/week  Duration of Treatment: up to 90 days    Education Assessment:   Learner/Method: Patient  Education Comments: Do not take Meclizine 24 hours prior to next appointment, BPPV educ, move your head as normal as possible, will weaken vestibular system if you keep your head fixed forward    Risks and benefits of evaluation/treatment have been explained.   Patient/Family/caregiver agrees with Plan of Care.     Evaluation Time:     PT Eval, Low Complexity Minutes (23793): 35     Signing Clinician: MECHELLE Tyson [facility]                                                                                   OUTPATIENT PHYSICAL THERAPY      PLAN OF TREATMENT FOR OUTPATIENT REHABILITATION   Patient's Last Name, First Name, Romi Richardson YOB: 1960   Provider's Name   [facility]   Medical Record No.  [id number]     Onset Date: 03/17/25  Start of Care Date: 03/21/25     Medical Diagnosis:  Vertigo, BPPV      PT Treatment Diagnosis:  BPPV Plan of Treatment  Frequency/Duration: 1x/week/ up to 90 days    Certification date from 03/21/25 to 06/18/25         See note for plan of treatment details and functional goals     Karis Hathaway PT                         I CERTIFY THE NEED FOR THESE SERVICES FURNISHED UNDER        THIS PLAN OF TREATMENT AND WHILE UNDER MY CARE     (Physician attestation of this document indicates review and certification of the therapy plan).              Referring Provider:  Mayra Persaud    Initial Assessment  See Epic Evaluation- Start of Care Date: 03/21/25

## 2025-04-02 ENCOUNTER — THERAPY VISIT (OUTPATIENT)
Dept: OCCUPATIONAL THERAPY | Facility: CLINIC | Age: 65
End: 2025-04-02
Payer: COMMERCIAL

## 2025-04-02 DIAGNOSIS — M79.641 HAND PAIN, RIGHT: Primary | ICD-10-CM

## 2025-04-02 PROCEDURE — 97110 THERAPEUTIC EXERCISES: CPT | Mod: GO | Performed by: OCCUPATIONAL THERAPIST

## 2025-04-02 PROCEDURE — 97535 SELF CARE MNGMENT TRAINING: CPT | Mod: GO | Performed by: OCCUPATIONAL THERAPIST

## 2025-04-02 NOTE — PROGRESS NOTES
Hand Therapy Progress Note  Please refer to the daily flowsheet for treatment today, total treatment time and time spent performing 1:1 timed codes.       Current Date:  4/2/2025 04/02/25 0500   Appointment Info   Treating Provider Alanna Beltre MS, OTR/L, CHT   Total/Authorized Visits 10 POC   Visits Used 6   Medical Diagnosis Right hand pain and paresthesias   OT Tx Diagnosis Right hand pain and paresthesias   Precautions/Limitations None   Quick Add  Certification   Progress Note/Certification   Start Of Care Date 01/02/25   Onset of Illness/Injury or Date of Surgery 12/18/24  (Orders)   Therapy Frequency 4 visits   Predicted Duration In 10 weeks   Certification date from 04/01/25   Certification date to 06/11/25   Progress Note Due Date 06/11/25   Progress Note Completed Date 04/02/25   OT Goal 1   Goal Identifier Driving   Goal Description Pt will perform the above task with no pain or difficulty on 100% of trials in the span of a week, in order to return to baseline level of independent function in IADLs.   Goal Progress Still having some difficulties with repetitive tasks and lifting.  Extend goal due dates based on progress rate to this point.   Target Date 06/11/25   Subjective Report   Subjective Report The lateral elbow (LEP area) is a little better. The forearm seems to be a bothersome area.   Objective Measures   Objective Measures Objective Measure 1;Objective Measure 2;Objective Measure 3;Objective Measure 4;Objective Measure 5   Objective Measure 1   Objective Measure Right forearm extensor wad   Details Becomes sore with some testing and exercises today   Objective Measure 3   Objective Measure long finger test   Details No pain to elbow area.   Objective Measure 4   Objective Measure active    Details Feels it more to the ext wad area than the LEP itself.   Treatment Interventions (OT)   Interventions Therapeutic Procedure/Exercise   Self Care/Home Management   Self-Care/Home  Mgmt/ADL, Compensatory, Meal Prep Minutes (04474) 15 Minutes   Self Care 1 - Details Discussion of current function, activity modifications, self management of symptoms,  response to treatment, and grading activities. Reassessment of functional status in order to guide treatment for the session and overall tx planning.   Self Care 2 Patient interested in trying to play some music, for short period, to assess response.   Self Care 2 - Details Recommend performing forearm and shoulder stretches before playing.   Self Care 3 Reviewed some musicians stretches as recommended from a musician injury prevention book.   Self Care 4 4/2: kinesiotaping:Applied tape with a light stretch starting at mid dorsal forearm, along to the lateral epicondyle and into the upper arm. Also Y strip starting at medial proximal forearm, strong tension, straddling the PIN site, and tacking to lateral FA/ext wad.   Skilled Intervention Skilled ongoing assessment of ADL performance required in order to guide treatment for the session and for home programming. Occupational performance framework utilized to understand the patient's unique needs in order to guide them towards optimal function in ADL.   Therapeutic Procedure/Exercise   Therapeutic Procedure: strength, endurance, ROM, flexibillity minutes (29988) 15   Ther Proc 1 Reviewed stretches and nerve glides.  Patient performs these in clinic.  She is performing them very well, and radial nerve glide is getting easier.   Ther Proc 1 - Details Patient is independent with stretches and nerve glides for home.   Patient Response/Progress It is appropriate for patient to try the wrist eccentric strengthening at home.   Ther Proc 3 Tried a new exercise, 10 reps, 2 sets of wrist eccentric strengthening with a 1 pound weight.   Ther Proc 3 - Details Demonstration and moderate cues, fading to independence.   Therapeutic Procedures Ther Proc 2;Ther Proc 3   Skilled Intervention Demonstration, verbal  cues and individualized guidance required for new exercises.   Education   Learner/Method Patient;No Barriers to Learning;Listening;Reading;Demonstration;Pictures/Video   Plan   Home program PT Rx on phone, some portions printed   Plan for next session Checkup music playing went.  Check wrist extension eccentric's?   Comments   Comments Please note: This documentation was generated by keyboarding and dictation with voice recognition software. There may be errors that have gone undetected. Please consider this when reviewing the chart and contact writer if there are concerns.   Total Session Time   Timed Code Treatment Minutes 30   Total Treatment Time (sum of timed and untimed services) 30         Assessment/Plan:  Response to therapy has been improvement to:  building a home program to address deficits and manage symptoms.  Pt continues to present with pain and weakness/loss of strength impacting their function.  Patient no longer has paresthesias to the hand, but does have right lateral epicondyle pain and right extensor wad area pain.  Patient requires further skilled therapy in order to facilitate return to function at the highest expected level, given their presenting diagnosis.    Overall Assessment:  Patient is ready to progress to more complex exercises.  Patient would benefit from continued therapy to achieve rehab potential  STG/LTG:  See above for details and updates.      [facility]                                                                                   OUTPATIENT OCCUPATIONAL THERAPY    PLAN OF TREATMENT FOR OUTPATIENT REHABILITATION   Patient's Last Name, First Name, Romi Richardson YOB: 1960   Provider's Name   [facility]   Medical Record No.  [id number]     Onset Date: 12/18/24 (Orders) Start of Care Date: 01/02/25     Medical Diagnosis:  Right hand pain and paresthesias      OT Treatment Diagnosis:   Right hand pain and paresthesias Plan of Treatment  Frequency/Duration:(P) 4 visits/(P) In 10 weeks    Certification date from 04/01/25   To (P) 06/11/25        See note for plan of treatment details and functional goals     Alanna Beltre OT                         I CERTIFY THE NEED FOR THESE SERVICES FURNISHED UNDER        THIS PLAN OF TREATMENT AND WHILE UNDER MY CARE .             Physician Signature               Date    X_____________________________________________________                  Referring Provider:  Deirdre Villalba    Initial Assessment  See Epic Evaluation- 01/02/25

## 2025-04-19 DIAGNOSIS — F31.81 BIPOLAR II DISORDER (H): ICD-10-CM

## 2025-04-21 RX ORDER — LAMOTRIGINE 200 MG/1
200 TABLET ORAL DAILY
Qty: 30 TABLET | Refills: 0 | Status: SHIPPED | OUTPATIENT
Start: 2025-04-21

## 2025-04-21 NOTE — TELEPHONE ENCOUNTER
"Date of Last Office Visit: 1/9/25  RTC: 12 weeks  No shows: 0  Cancellations: 0  Date of Next Office Visit: 0  ------------------------------    Last Script Details::    lamoTRIgine (LAMICTAL) 200 MG tablet 30 tablet 2 1/9/2025     ------------------------------  From last visit note:   \"she chooses to continue lamotrigine 200mg daily \"        Refill Decision:    [x]Medication refilled per  Medication Refill in Ambulatory Care  policy.         [x] Supervision: no future appointment scheduled. Scheduling has been notified to contact the pt for appointment.    30 day alexis refill sent to the pharmacy - including instructions for patient to call the clinic and schedule an appointment.       Request from pharmacy:  Requested Prescriptions   Pending Prescriptions Disp Refills    lamoTRIgine (LAMICTAL) 200 MG tablet [Pharmacy Med Name: LAMOTRIGINE 200 MG TABLET] 30 tablet 2     Sig: TAKE 1 TABLET BY MOUTH EVERY DAY       Anti-Seizure Meds Protocol  Failed - 4/21/2025 10:41 AM        Failed - Review Authorizing provider's last note.      Refer to last progress notes: confirm request is for original authorizing provider (cannot be through other providers).          Failed - Medication indicated for associated diagnosis     Medication is associated with one or more of the following diagnoses:     Bipolar   Dementia   Depression   Epilepsy   Migraine   Seizure   Trigeminal Neuralgia   Cyclothymia          Passed - Normal ALT or AST on file in past 26 months     Recent Labs   Lab Test 06/12/24  1521   ALT 46     Recent Labs   Lab Test 06/12/24  1521   AST 27             Passed - Medication is active on med list and the sig matches. RN to manually verify dose and sig if red X/fail.     If the protocol passes (green check), you do not need to verify med dose and sig.    A prescription matches if they are the same clinical intention.    For Example: once daily and every morning are the same.    The protocol can not identify upper " and lower case letters as matching and will fail.     For Example: Take 1 tablet (50 mg) by mouth daily     TAKE 1 TABLET (50 MG) BY MOUTH DAILY    For all fails (red x), verify dose and sig.    If the refill does match what is on file, the RN can still proceed to approve the refill request.       If they do not match, route to the appropriate provider.             Passed - Has GFR on file in past 12 months and most recent value is normal        Passed - Recent (12 mo) or future (90 days) visit within the authorizing provider's specialty     The patient must have completed an in-person or virtual visit within the past 12 months or has a future visit scheduled within the next 90 days with the authorizing provider s specialty.  Urgent care and e-visits do not qualify as an office visit for this protocol.          Passed - No active pregnancy on record        Passed - No positive pregnancy test in last 12 months

## 2025-04-27 DIAGNOSIS — F90.0 ADHD (ATTENTION DEFICIT HYPERACTIVITY DISORDER), INATTENTIVE TYPE: ICD-10-CM

## 2025-04-29 RX ORDER — ATOMOXETINE 40 MG/1
40 CAPSULE ORAL DAILY
Qty: 30 CAPSULE | Refills: 0 | Status: SHIPPED | OUTPATIENT
Start: 2025-04-29 | End: 2025-05-01

## 2025-04-29 NOTE — TELEPHONE ENCOUNTER
Last Written Prescription:     atomoxetine (STRATTERA) 40 MG capsule 30 capsule 2 1/9/2025 -- No   Sig - Route: Take 1 capsule (40 mg) by mouth daily. - Oral     Medication requested:  atomoxetine (STRATTERA) 40 MG capsule  Last refilled: 1/9/25  Qty: 30/2 RF    Date of Last Office Visit: 1/9/25  RTC: 12 weeks  No shows: 0  Cancellations: 0  Date of Next Office Visit: 5/1/25         Refill decision: Medication refilled per  Medication Refill in Ambulatory Care  policy.   Request from pharmacy:  Requested Prescriptions   Pending Prescriptions Disp Refills    atomoxetine (STRATTERA) 40 MG capsule [Pharmacy Med Name: ATOMOXETINE HCL 40 MG CAPSULE] 90 capsule      Sig: TAKE 1 CAPSULE BY MOUTH EVERY DAY       Attention Deficit/Hyperactivity Disorder (ADHD) Agents Protocol Failed - 4/29/2025 10:50 AM        Failed - Most recent blood pressure under 140/90 in past 12 months     BP Readings from Last 3 Encounters:   03/17/25 (!) 143/90   10/27/23 137/88   08/09/23 103/69       No data recorded            Failed - RN Chart Review: If there has been a dose change or started on the medication since the previous refill, do not authorize. Send refill request to the provider.     Please review patient refills to confirm   This refill request isn't the 1st refill following initial prescription   OR   This refill request is not the 1st refill following a dose change.  If either of the above 2 are TRUE, patient must have had a recent visit within the past 30 days with the authorizing provider or a provider within his/her clinic AND specialty.           Passed - Patient is age 6 or older        Passed - Medication is active on med list and the sig matches. RN to manually verify dose and sig if red X/fail.     If the protocol passes (green check), you do not need to verify med dose and sig.    A prescription matches if they are the same clinical intention.    For Example: once daily and every morning are the same.    The protocol can  not identify upper and lower case letters as matching and will fail.     For Example: Take 1 tablet (50 mg) by mouth daily     TAKE 1 TABLET (50 MG) BY MOUTH DAILY    For all fails (red x), verify dose and sig.    If the refill does match what is on file, the RN can still proceed to approve the refill request.       If they do not match, route to the appropriate provider.             Passed - Recent (6 mo) or future (90 days)  visit within the authorizing provider's specialty     The patient must have completed an in-person or virtual visit within the past 6 months or has a future visit scheduled within the next 90 days with the authorizing provider s specialty.  Urgent care and e-visits do not quality as an office visit for this protocol.          Passed - Medicationindicatedfor associated diagnosis     The medication is prescribed for one or more of the following conditions:    Attention deficit hyperactive disorder   Attention deficit hyperactive disorder - Social phobia            Passed - No active pregnancy on record        Passed - No positive pregnancy test in last 12 months

## 2025-05-01 ENCOUNTER — VIRTUAL VISIT (OUTPATIENT)
Dept: PSYCHIATRY | Facility: CLINIC | Age: 65
End: 2025-05-01
Attending: NURSE PRACTITIONER
Payer: COMMERCIAL

## 2025-05-01 DIAGNOSIS — F90.0 ADHD (ATTENTION DEFICIT HYPERACTIVITY DISORDER), INATTENTIVE TYPE: ICD-10-CM

## 2025-05-01 DIAGNOSIS — F31.81 BIPOLAR II DISORDER (H): ICD-10-CM

## 2025-05-01 RX ORDER — LAMOTRIGINE 200 MG/1
200 TABLET ORAL DAILY
Qty: 90 TABLET | Refills: 0 | Status: SHIPPED | OUTPATIENT
Start: 2025-05-01

## 2025-05-01 RX ORDER — DESVENLAFAXINE 50 MG/1
50 TABLET, FILM COATED, EXTENDED RELEASE ORAL DAILY
Qty: 90 TABLET | Refills: 0 | Status: SHIPPED | OUTPATIENT
Start: 2025-05-01

## 2025-05-01 RX ORDER — ATOMOXETINE 40 MG/1
40 CAPSULE ORAL DAILY
Qty: 90 CAPSULE | Refills: 0 | Status: SHIPPED | OUTPATIENT
Start: 2025-05-01

## 2025-05-01 ASSESSMENT — ANXIETY QUESTIONNAIRES
GAD7 TOTAL SCORE: 7
5. BEING SO RESTLESS THAT IT IS HARD TO SIT STILL: NOT AT ALL
7. FEELING AFRAID AS IF SOMETHING AWFUL MIGHT HAPPEN: SEVERAL DAYS
3. WORRYING TOO MUCH ABOUT DIFFERENT THINGS: SEVERAL DAYS
1. FEELING NERVOUS, ANXIOUS, OR ON EDGE: MORE THAN HALF THE DAYS
GAD7 TOTAL SCORE: 7
GAD7 TOTAL SCORE: 7
2. NOT BEING ABLE TO STOP OR CONTROL WORRYING: SEVERAL DAYS
8. IF YOU CHECKED OFF ANY PROBLEMS, HOW DIFFICULT HAVE THESE MADE IT FOR YOU TO DO YOUR WORK, TAKE CARE OF THINGS AT HOME, OR GET ALONG WITH OTHER PEOPLE?: SOMEWHAT DIFFICULT
7. FEELING AFRAID AS IF SOMETHING AWFUL MIGHT HAPPEN: SEVERAL DAYS
4. TROUBLE RELAXING: SEVERAL DAYS
6. BECOMING EASILY ANNOYED OR IRRITABLE: SEVERAL DAYS
IF YOU CHECKED OFF ANY PROBLEMS ON THIS QUESTIONNAIRE, HOW DIFFICULT HAVE THESE PROBLEMS MADE IT FOR YOU TO DO YOUR WORK, TAKE CARE OF THINGS AT HOME, OR GET ALONG WITH OTHER PEOPLE: SOMEWHAT DIFFICULT

## 2025-05-01 ASSESSMENT — PATIENT HEALTH QUESTIONNAIRE - PHQ9
SUM OF ALL RESPONSES TO PHQ QUESTIONS 1-9: 4
10. IF YOU CHECKED OFF ANY PROBLEMS, HOW DIFFICULT HAVE THESE PROBLEMS MADE IT FOR YOU TO DO YOUR WORK, TAKE CARE OF THINGS AT HOME, OR GET ALONG WITH OTHER PEOPLE: SOMEWHAT DIFFICULT
SUM OF ALL RESPONSES TO PHQ QUESTIONS 1-9: 4

## 2025-05-01 NOTE — PATIENT INSTRUCTIONS
**For crisis resources, please see the information at the end of this document**   Patient Education    Thank you for coming to the [facility].     Lab Testing:  If you had lab testing today and your results are reassuring or normal they will be mailed to you or sent through [employer] within 7 days. If the lab tests need quick action we will call you with the results. The phone number we will call with results is # [phone]. If this is not the best number please call our clinic and change the number.     Medication Refills:  If you need any refills please call your pharmacy and they will contact us. Our fax number for refills is [phone].   Three business days of notice are needed for general medication refill requests.   Five business days of notice are needed for controlled substance refill requests.   If you need to change to a different pharmacy, please contact the new pharmacy directly. The new pharmacy will help you get your medications transferred.     Contact Us:  Please call [phone] during business hours (8-5:00 M-F).   If you have medication related questions after clinic hours, or on the weekend, please call [phone].     Financial Assistance [phone]   Medical Records [phone]       MENTAL HEALTH CRISIS RESOURCES:  For a emergency help, please call 911 or go to the nearest Emergency Department.     Emergency Walk-In Options:   [facility] Unit @ [facility] ([place]): [phone] - Specialized mental health emergency area designed to be calming  [facility] ([place]): [phone]  [facility] ([place]): [phone]  [facility]): [phone]    [facility] Crisis Information:   [place]: [phone]  [place]: [phone]  [place] ([place]) - Adult: [phone]     Child: [phone]  [place] - Adult: [phone]     Child: [phone]  [place]:  [phone]  List of all [facility] resources:   https://[place].gov/dhs/people-we-serve/adults/health-care/mental-health/resources/crisis-contacts.jsp    National Crisis Information:   Crisis Text Line: Text  [place]  to 255914  Suicide & Crisis Lifeline: 988  National Suicide Prevention Lifeline: 9-549-176-TALK (1-742.516.4759)       For online chat options, visit https://suicidepreventionlifeline.org/chat/  Poison Control Center: 4-321-701-3065  Trans Lifeline: 8-221-644-3975 - Hotline for transgender people of all ages  The [employer] Project: 6-656-960-2239 - Hotline for LGBT youth     For Non-Emergency Support:   Fast Tracker: Mental Health & Substance Use Disorder Resources -   https://www.[place]vealn.org/

## 2025-05-01 NOTE — NURSING NOTE
Current patient location[address] [place] [address]    Is the patient currently in the state of [place]? YES    Visit mode: VIDEO    If the visit is dropped, the patient can be reconnected by:VIDEO VISIT: Text to cell phone:   Telephone Information:   Mobile [phone]       Will anyone else be joining the visit? NO  (If patient encounters technical issues they should call [phone]    Are changes needed to the allergy or medication list? No    Are refills needed on medications prescribed by this physician? YES    Rooming Documentation:  Patient will complete questionnaire(s) in [facility]    Reason for visit: RECHECK    Jairo BRUNERF
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
## 2025-05-07 ENCOUNTER — THERAPY VISIT (OUTPATIENT)
Dept: OCCUPATIONAL THERAPY | Facility: CLINIC | Age: 65
End: 2025-05-07
Payer: COMMERCIAL

## 2025-05-07 DIAGNOSIS — M79.641 HAND PAIN, RIGHT: Primary | ICD-10-CM

## 2025-05-07 PROCEDURE — 97110 THERAPEUTIC EXERCISES: CPT | Mod: GO | Performed by: OCCUPATIONAL THERAPIST

## 2025-06-23 ENCOUNTER — THERAPY VISIT (OUTPATIENT)
Dept: OCCUPATIONAL THERAPY | Facility: CLINIC | Age: 65
End: 2025-06-23
Payer: COMMERCIAL

## 2025-06-23 DIAGNOSIS — M79.641 HAND PAIN, RIGHT: Primary | ICD-10-CM

## 2025-06-23 PROCEDURE — 97110 THERAPEUTIC EXERCISES: CPT | Mod: GO | Performed by: OCCUPATIONAL THERAPIST

## 2025-07-26 ENCOUNTER — HEALTH MAINTENANCE LETTER (OUTPATIENT)
Age: 65
End: 2025-07-26

## 2025-07-31 DIAGNOSIS — F90.0 ADHD (ATTENTION DEFICIT HYPERACTIVITY DISORDER), INATTENTIVE TYPE: ICD-10-CM

## 2025-07-31 DIAGNOSIS — F31.81 BIPOLAR II DISORDER (H): ICD-10-CM

## 2025-07-31 RX ORDER — DESVENLAFAXINE 50 MG/1
50 TABLET, FILM COATED, EXTENDED RELEASE ORAL DAILY
Qty: 90 TABLET | Refills: 0 | Status: SHIPPED | OUTPATIENT
Start: 2025-07-31

## 2025-07-31 RX ORDER — LAMOTRIGINE 200 MG/1
200 TABLET ORAL DAILY
Qty: 90 TABLET | Refills: 0 | Status: SHIPPED | OUTPATIENT
Start: 2025-07-31

## 2025-07-31 RX ORDER — ATOMOXETINE 40 MG/1
40 CAPSULE ORAL DAILY
Qty: 90 CAPSULE | Refills: 0 | Status: SHIPPED | OUTPATIENT
Start: 2025-07-31

## 2025-08-14 ENCOUNTER — VIRTUAL VISIT (OUTPATIENT)
Dept: PSYCHIATRY | Facility: CLINIC | Age: 65
End: 2025-08-14
Attending: NURSE PRACTITIONER
Payer: COMMERCIAL

## 2025-08-14 DIAGNOSIS — F31.81 BIPOLAR II DISORDER (H): Primary | ICD-10-CM

## 2025-08-14 DIAGNOSIS — F90.0 ADHD (ATTENTION DEFICIT HYPERACTIVITY DISORDER), INATTENTIVE TYPE: ICD-10-CM

## 2025-08-14 ASSESSMENT — ANXIETY QUESTIONNAIRES
7. FEELING AFRAID AS IF SOMETHING AWFUL MIGHT HAPPEN: MORE THAN HALF THE DAYS
GAD7 TOTAL SCORE: 14
3. WORRYING TOO MUCH ABOUT DIFFERENT THINGS: MORE THAN HALF THE DAYS
5. BEING SO RESTLESS THAT IT IS HARD TO SIT STILL: SEVERAL DAYS
GAD7 TOTAL SCORE: 14
1. FEELING NERVOUS, ANXIOUS, OR ON EDGE: MORE THAN HALF THE DAYS
GAD7 TOTAL SCORE: 14
8. IF YOU CHECKED OFF ANY PROBLEMS, HOW DIFFICULT HAVE THESE MADE IT FOR YOU TO DO YOUR WORK, TAKE CARE OF THINGS AT HOME, OR GET ALONG WITH OTHER PEOPLE?: VERY DIFFICULT
6. BECOMING EASILY ANNOYED OR IRRITABLE: NEARLY EVERY DAY
4. TROUBLE RELAXING: MORE THAN HALF THE DAYS
7. FEELING AFRAID AS IF SOMETHING AWFUL MIGHT HAPPEN: MORE THAN HALF THE DAYS
IF YOU CHECKED OFF ANY PROBLEMS ON THIS QUESTIONNAIRE, HOW DIFFICULT HAVE THESE PROBLEMS MADE IT FOR YOU TO DO YOUR WORK, TAKE CARE OF THINGS AT HOME, OR GET ALONG WITH OTHER PEOPLE: VERY DIFFICULT
2. NOT BEING ABLE TO STOP OR CONTROL WORRYING: MORE THAN HALF THE DAYS

## 2025-08-14 ASSESSMENT — PATIENT HEALTH QUESTIONNAIRE - PHQ9
SUM OF ALL RESPONSES TO PHQ QUESTIONS 1-9: 11
10. IF YOU CHECKED OFF ANY PROBLEMS, HOW DIFFICULT HAVE THESE PROBLEMS MADE IT FOR YOU TO DO YOUR WORK, TAKE CARE OF THINGS AT HOME, OR GET ALONG WITH OTHER PEOPLE: SOMEWHAT DIFFICULT
SUM OF ALL RESPONSES TO PHQ QUESTIONS 1-9: 11

## 2025-08-14 ASSESSMENT — PAIN SCALES - GENERAL: PAINLEVEL_OUTOF10: NO PAIN (0)

## (undated) DEVICE — BRUSH SURGICAL SCRUB W/4% CHG SOL 25ML 371073

## (undated) DEVICE — GLOVE BIOGEL PI SZ 7.5 40875

## (undated) DEVICE — DRAPE IOBAN INCISE 23X17" 6650EZ

## (undated) DEVICE — PACK SHOULDER CUSTOM ASC SOP15ASUMA

## (undated) DEVICE — TUBING SYSTEM ARTHREX PATIENT REDEUCE AR-6421

## (undated) DEVICE — SOL NACL 0.9% IRRIG 3000ML BAG 2B7477

## (undated) DEVICE — TUBING SET ARTHREX DUALWAVE OUTFLOW AR-6430

## (undated) DEVICE — SU MONOCRYL 3-0 PS-1 27" Y936H

## (undated) DEVICE — GLOVE PROTEXIS POWDER FREE SMT 7.0  2D72PT70X

## (undated) DEVICE — IMM KIT SHOULDER STABILIZATION 7210573

## (undated) DEVICE — SUCTION MANIFOLD NEPTUNE 2 SYS 4 PORT 0702-020-000

## (undated) DEVICE — DRSG STERI STRIP 1/2X4" R1547

## (undated) DEVICE — SYR 10ML LL W/O NDL

## (undated) DEVICE — DRAPE STERI U 1015

## (undated) DEVICE — BUR ARTHREX COOLCUT EXCALIBUR 4.0MMX13CM AR-8400EX

## (undated) DEVICE — LINEN ORTHO PACK 5446

## (undated) DEVICE — DRAPE ARTHROSCOPY SHOULDER BEACHCHAIR 29369

## (undated) DEVICE — PREP DURAPREP 26ML APL 8630

## (undated) DEVICE — DRAPE MAYO STAND 23X54 8337

## (undated) DEVICE — IMM KIT SHOULDER TMAX MASK FACE 7210559

## (undated) RX ORDER — FENTANYL CITRATE 50 UG/ML
INJECTION, SOLUTION INTRAMUSCULAR; INTRAVENOUS
Status: DISPENSED
Start: 2022-03-22

## (undated) RX ORDER — LIDOCAINE HYDROCHLORIDE 20 MG/ML
INJECTION, SOLUTION EPIDURAL; INFILTRATION; INTRACAUDAL; PERINEURAL
Status: DISPENSED
Start: 2022-03-22

## (undated) RX ORDER — ONDANSETRON 2 MG/ML
INJECTION INTRAMUSCULAR; INTRAVENOUS
Status: DISPENSED
Start: 2022-03-22

## (undated) RX ORDER — CEFAZOLIN SODIUM 1 G/3ML
INJECTION, POWDER, FOR SOLUTION INTRAMUSCULAR; INTRAVENOUS
Status: DISPENSED
Start: 2022-03-22

## (undated) RX ORDER — DEXAMETHASONE SODIUM PHOSPHATE 4 MG/ML
INJECTION, SOLUTION INTRA-ARTICULAR; INTRALESIONAL; INTRAMUSCULAR; INTRAVENOUS; SOFT TISSUE
Status: DISPENSED
Start: 2022-03-22

## (undated) RX ORDER — ACETAMINOPHEN 325 MG/1
TABLET ORAL
Status: DISPENSED
Start: 2022-03-22

## (undated) RX ORDER — FENTANYL CITRATE-0.9 % NACL/PF 10 MCG/ML
PLASTIC BAG, INJECTION (ML) INTRAVENOUS
Status: DISPENSED
Start: 2022-03-22

## (undated) RX ORDER — PROPOFOL 10 MG/ML
INJECTION, EMULSION INTRAVENOUS
Status: DISPENSED
Start: 2022-03-22

## (undated) RX ORDER — FENTANYL CITRATE 50 UG/ML
INJECTION, SOLUTION INTRAMUSCULAR; INTRAVENOUS
Status: DISPENSED
Start: 2023-08-09

## (undated) RX ORDER — GABAPENTIN 300 MG/1
CAPSULE ORAL
Status: DISPENSED
Start: 2022-03-22